# Patient Record
Sex: MALE | Race: WHITE | NOT HISPANIC OR LATINO | URBAN - METROPOLITAN AREA
[De-identification: names, ages, dates, MRNs, and addresses within clinical notes are randomized per-mention and may not be internally consistent; named-entity substitution may affect disease eponyms.]

---

## 2017-10-11 ENCOUNTER — EMERGENCY (EMERGENCY)
Facility: HOSPITAL | Age: 82
LOS: 1 days | Discharge: HOME | End: 2017-10-11
Attending: EMERGENCY MEDICINE | Admitting: EMERGENCY MEDICINE
Payer: MEDICARE

## 2017-10-11 VITALS
SYSTOLIC BLOOD PRESSURE: 102 MMHG | TEMPERATURE: 98 F | OXYGEN SATURATION: 100 % | DIASTOLIC BLOOD PRESSURE: 73 MMHG | RESPIRATION RATE: 18 BRPM | HEART RATE: 80 BPM

## 2017-10-11 VITALS
HEART RATE: 71 BPM | TEMPERATURE: 98 F | DIASTOLIC BLOOD PRESSURE: 67 MMHG | SYSTOLIC BLOOD PRESSURE: 110 MMHG | RESPIRATION RATE: 18 BRPM | OXYGEN SATURATION: 99 %

## 2017-10-11 DIAGNOSIS — E11.65 TYPE 2 DIABETES MELLITUS WITH HYPERGLYCEMIA: ICD-10-CM

## 2017-10-11 LAB
ALBUMIN SERPL ELPH-MCNC: 4.1 G/DL — SIGNIFICANT CHANGE UP (ref 3.3–5)
ALP SERPL-CCNC: 66 U/L — SIGNIFICANT CHANGE UP (ref 40–120)
ALT FLD-CCNC: 15 U/L — SIGNIFICANT CHANGE UP (ref 10–45)
ANION GAP SERPL CALC-SCNC: 13 MMOL/L — SIGNIFICANT CHANGE UP (ref 5–17)
AST SERPL-CCNC: 19 U/L — SIGNIFICANT CHANGE UP (ref 10–40)
BASE EXCESS BLDV CALC-SCNC: 3.6 MMOL/L — SIGNIFICANT CHANGE UP
BASOPHILS NFR BLD AUTO: 1.5 % — SIGNIFICANT CHANGE UP (ref 0–2)
BILIRUB SERPL-MCNC: 0.5 MG/DL — SIGNIFICANT CHANGE UP (ref 0.2–1.2)
BUN SERPL-MCNC: 25 MG/DL — HIGH (ref 7–23)
CALCIUM SERPL-MCNC: 9.5 MG/DL — SIGNIFICANT CHANGE UP (ref 8.4–10.5)
CHLORIDE SERPL-SCNC: 97 MMOL/L — SIGNIFICANT CHANGE UP (ref 96–108)
CO2 SERPL-SCNC: 26 MMOL/L — SIGNIFICANT CHANGE UP (ref 22–31)
CREAT SERPL-MCNC: 0.72 MG/DL — SIGNIFICANT CHANGE UP (ref 0.5–1.3)
EOSINOPHIL NFR BLD AUTO: 1.1 % — SIGNIFICANT CHANGE UP (ref 0–6)
GAS PNL BLDV: SIGNIFICANT CHANGE UP
GLUCOSE SERPL-MCNC: 322 MG/DL — HIGH (ref 70–99)
HCO3 BLDV-SCNC: 30 MMOL/L — HIGH (ref 20–27)
HCT VFR BLD CALC: 35.6 % — LOW (ref 39–50)
HGB BLD-MCNC: 12 G/DL — LOW (ref 13–17)
LIDOCAIN IGE QN: 32 U/L — SIGNIFICANT CHANGE UP (ref 7–60)
LYMPHOCYTES # BLD AUTO: 29.9 % — SIGNIFICANT CHANGE UP (ref 13–44)
MAGNESIUM SERPL-MCNC: 2 MG/DL — SIGNIFICANT CHANGE UP (ref 1.6–2.6)
MCHC RBC-ENTMCNC: 30.9 PG — SIGNIFICANT CHANGE UP (ref 27–34)
MCHC RBC-ENTMCNC: 33.7 G/DL — SIGNIFICANT CHANGE UP (ref 32–36)
MCV RBC AUTO: 91.8 FL — SIGNIFICANT CHANGE UP (ref 80–100)
MONOCYTES NFR BLD AUTO: 14.3 % — HIGH (ref 2–14)
NEUTROPHILS NFR BLD AUTO: 53.2 % — SIGNIFICANT CHANGE UP (ref 43–77)
PCO2 BLDV: 51 MMHG — SIGNIFICANT CHANGE UP (ref 41–51)
PH BLDV: 7.38 — SIGNIFICANT CHANGE UP (ref 7.32–7.43)
PLATELET # BLD AUTO: 153 K/UL — SIGNIFICANT CHANGE UP (ref 150–400)
PO2 BLDV: 41 MMHG — SIGNIFICANT CHANGE UP
POTASSIUM SERPL-MCNC: 4.2 MMOL/L — SIGNIFICANT CHANGE UP (ref 3.5–5.3)
POTASSIUM SERPL-SCNC: 4.2 MMOL/L — SIGNIFICANT CHANGE UP (ref 3.5–5.3)
PROT SERPL-MCNC: 7 G/DL — SIGNIFICANT CHANGE UP (ref 6–8.3)
RBC # BLD: 3.88 M/UL — LOW (ref 4.2–5.8)
RBC # FLD: 13.3 % — SIGNIFICANT CHANGE UP (ref 10.3–16.9)
SAO2 % BLDV: 70 % — SIGNIFICANT CHANGE UP
SODIUM SERPL-SCNC: 136 MMOL/L — SIGNIFICANT CHANGE UP (ref 135–145)
WBC # BLD: 5.3 K/UL — SIGNIFICANT CHANGE UP (ref 3.8–10.5)
WBC # FLD AUTO: 5.3 K/UL — SIGNIFICANT CHANGE UP (ref 3.8–10.5)

## 2017-10-11 PROCEDURE — 82962 GLUCOSE BLOOD TEST: CPT

## 2017-10-11 PROCEDURE — 80053 COMPREHEN METABOLIC PANEL: CPT

## 2017-10-11 PROCEDURE — 36415 COLL VENOUS BLD VENIPUNCTURE: CPT

## 2017-10-11 PROCEDURE — 83690 ASSAY OF LIPASE: CPT

## 2017-10-11 PROCEDURE — 82803 BLOOD GASES ANY COMBINATION: CPT

## 2017-10-11 PROCEDURE — 85025 COMPLETE CBC W/AUTO DIFF WBC: CPT

## 2017-10-11 PROCEDURE — 83735 ASSAY OF MAGNESIUM: CPT

## 2017-10-11 PROCEDURE — 99284 EMERGENCY DEPT VISIT MOD MDM: CPT

## 2017-10-11 PROCEDURE — 99284 EMERGENCY DEPT VISIT MOD MDM: CPT | Mod: 25

## 2017-10-11 RX ORDER — SODIUM CHLORIDE 9 MG/ML
1000 INJECTION INTRAMUSCULAR; INTRAVENOUS; SUBCUTANEOUS ONCE
Qty: 0 | Refills: 0 | Status: COMPLETED | OUTPATIENT
Start: 2017-10-11 | End: 2017-10-11

## 2017-10-11 RX ORDER — SODIUM CHLORIDE 9 MG/ML
500 INJECTION INTRAMUSCULAR; INTRAVENOUS; SUBCUTANEOUS ONCE
Qty: 0 | Refills: 0 | Status: COMPLETED | OUTPATIENT
Start: 2017-10-11 | End: 2017-10-11

## 2017-10-11 RX ADMIN — SODIUM CHLORIDE 500 MILLILITER(S): 9 INJECTION INTRAMUSCULAR; INTRAVENOUS; SUBCUTANEOUS at 04:05

## 2017-10-11 RX ADMIN — SODIUM CHLORIDE 1000 MILLILITER(S): 9 INJECTION INTRAMUSCULAR; INTRAVENOUS; SUBCUTANEOUS at 03:25

## 2017-10-11 NOTE — ED ADULT NURSE NOTE - OBJECTIVE STATEMENT
pt received into spot 3 A&Ox3 ambulatory appears comfortable complaining of hyperglycemia. Hx of DM on insulin and pills. States he checked his blood sugar at home and machine gave high reading. Pt denies any head ache blurry vision CP SOB abd pain N/V diarrhea constipation fever chills lightheadedness dizziness weakness muscle cramps increased thirst or frequency/ painful urination. Pt seems to have concern there might be an error with his machine he uses to check his sugar. Initial triage FS was 283. 20G PIV was placed to LAC labs drawn and sent pt given NS bolus x1.5. Serum glucose noted at 322. Pt refused 2nd full liter that was going to be ordered. FS recheck at this time was noted at 253. Pt stating he wants to go home but wants us to provide a new machine to check glucose. pt pulled out his machine to compare and the FS was noted at 305 with patient's own glucometer. Though this was slughtly different than ours, pt is going to continue regular medication regimen and see PMD Yasmine this week for possible medication adjustment or to facilitate new FS check equipment and supplies

## 2017-10-11 NOTE — ED ADULT NURSE NOTE - CHIEF COMPLAINT QUOTE
pt c/o high blood sugar at home machine reading was 346mg/dl , pt denies any polydipsia , no polyuria, pt c/o unsteady gait,  mg/dl in triage

## 2017-10-11 NOTE — ED ADULT TRIAGE NOTE - CHIEF COMPLAINT QUOTE
pt c/o high blood sugar at home machine reading was 346mg/dl , pt denies any polydipsia , no polyuria, pt c/o unsteady gait pt c/o high blood sugar at home machine reading was 346mg/dl , pt denies any polydipsia , no polyuria, pt c/o unsteady gait,  mg/dl in triage

## 2017-10-11 NOTE — ED PROVIDER NOTE - ATTENDING CONTRIBUTION TO CARE
I have reviewed all pertinent clinical information and agree with the documentation/care/plan provided by PA.

## 2018-07-20 ENCOUNTER — APPOINTMENT (OUTPATIENT)
Dept: HEART AND VASCULAR | Facility: CLINIC | Age: 83
End: 2018-07-20
Payer: MEDICARE

## 2018-07-20 ENCOUNTER — APPOINTMENT (OUTPATIENT)
Dept: HEART AND VASCULAR | Facility: CLINIC | Age: 83
End: 2018-07-20

## 2018-07-20 VITALS
BODY MASS INDEX: 22.65 KG/M2 | SYSTOLIC BLOOD PRESSURE: 110 MMHG | WEIGHT: 146.03 LBS | TEMPERATURE: 98.1 F | RESPIRATION RATE: 12 BRPM | OXYGEN SATURATION: 97 % | HEART RATE: 87 BPM | DIASTOLIC BLOOD PRESSURE: 56 MMHG | HEIGHT: 67.5 IN

## 2018-07-20 DIAGNOSIS — Z78.9 OTHER SPECIFIED HEALTH STATUS: ICD-10-CM

## 2018-07-20 DIAGNOSIS — Z87.891 PERSONAL HISTORY OF NICOTINE DEPENDENCE: ICD-10-CM

## 2018-07-20 PROBLEM — E11.9 TYPE 2 DIABETES MELLITUS WITHOUT COMPLICATIONS: Chronic | Status: ACTIVE | Noted: 2017-10-11

## 2018-07-20 PROCEDURE — 99214 OFFICE O/P EST MOD 30 MIN: CPT | Mod: 25

## 2018-07-20 PROCEDURE — 93000 ELECTROCARDIOGRAM COMPLETE: CPT

## 2018-07-20 RX ORDER — LINAGLIPTIN 5 MG/1
5 TABLET, FILM COATED ORAL
Refills: 0 | Status: ACTIVE | COMMUNITY

## 2018-07-20 RX ORDER — INSULIN LISPRO 100 [IU]/ML
100 INJECTION, SOLUTION INTRAVENOUS; SUBCUTANEOUS
Refills: 0 | Status: ACTIVE | COMMUNITY

## 2018-07-25 ENCOUNTER — APPOINTMENT (OUTPATIENT)
Dept: HEART AND VASCULAR | Facility: CLINIC | Age: 83
End: 2018-07-25
Payer: MEDICARE

## 2018-07-25 DIAGNOSIS — R06.09 OTHER FORMS OF DYSPNEA: ICD-10-CM

## 2018-07-25 DIAGNOSIS — E11.9 TYPE 2 DIABETES MELLITUS W/OUT COMPLICATIONS: ICD-10-CM

## 2018-07-25 PROCEDURE — 78452 HT MUSCLE IMAGE SPECT MULT: CPT

## 2018-07-25 PROCEDURE — 99213 OFFICE O/P EST LOW 20 MIN: CPT | Mod: 25

## 2018-07-25 PROCEDURE — A9500: CPT

## 2018-07-25 PROCEDURE — 93015 CV STRESS TEST SUPVJ I&R: CPT

## 2018-07-25 RX ORDER — LATANOPROST/PF 0.005 %
0.01 DROPS OPHTHALMIC (EYE)
Qty: 5 | Refills: 0 | Status: ACTIVE | COMMUNITY
Start: 2018-07-10

## 2019-09-04 ENCOUNTER — EMERGENCY (EMERGENCY)
Facility: HOSPITAL | Age: 84
LOS: 1 days | Discharge: HOME | End: 2019-09-04
Attending: EMERGENCY MEDICINE | Admitting: EMERGENCY MEDICINE
Payer: MEDICARE

## 2019-09-04 VITALS
WEIGHT: 143.96 LBS | HEIGHT: 70 IN | OXYGEN SATURATION: 99 % | DIASTOLIC BLOOD PRESSURE: 71 MMHG | TEMPERATURE: 98 F | RESPIRATION RATE: 18 BRPM | SYSTOLIC BLOOD PRESSURE: 149 MMHG | HEART RATE: 69 BPM

## 2019-09-04 DIAGNOSIS — E11.649 TYPE 2 DIABETES MELLITUS WITH HYPOGLYCEMIA WITHOUT COMA: ICD-10-CM

## 2019-09-04 DIAGNOSIS — E16.2 HYPOGLYCEMIA, UNSPECIFIED: ICD-10-CM

## 2019-09-04 LAB
BASOPHILS # BLD AUTO: 0.06 K/UL — SIGNIFICANT CHANGE UP (ref 0–0.2)
BASOPHILS NFR BLD AUTO: 1.2 % — SIGNIFICANT CHANGE UP (ref 0–2)
EOSINOPHIL # BLD AUTO: 0.03 K/UL — SIGNIFICANT CHANGE UP (ref 0–0.5)
EOSINOPHIL NFR BLD AUTO: 0.6 % — SIGNIFICANT CHANGE UP (ref 0–6)
HCT VFR BLD CALC: 37.1 % — LOW (ref 39–50)
HGB BLD-MCNC: 11.7 G/DL — LOW (ref 13–17)
IMM GRANULOCYTES NFR BLD AUTO: 0.2 % — SIGNIFICANT CHANGE UP (ref 0–1.5)
LYMPHOCYTES # BLD AUTO: 1.31 K/UL — SIGNIFICANT CHANGE UP (ref 1–3.3)
LYMPHOCYTES # BLD AUTO: 26.6 % — SIGNIFICANT CHANGE UP (ref 13–44)
MCHC RBC-ENTMCNC: 30.9 PG — SIGNIFICANT CHANGE UP (ref 27–34)
MCHC RBC-ENTMCNC: 31.5 GM/DL — LOW (ref 32–36)
MCV RBC AUTO: 97.9 FL — SIGNIFICANT CHANGE UP (ref 80–100)
MONOCYTES # BLD AUTO: 0.59 K/UL — SIGNIFICANT CHANGE UP (ref 0–0.9)
MONOCYTES NFR BLD AUTO: 12 % — SIGNIFICANT CHANGE UP (ref 2–14)
NEUTROPHILS # BLD AUTO: 2.93 K/UL — SIGNIFICANT CHANGE UP (ref 1.8–7.4)
NEUTROPHILS NFR BLD AUTO: 59.4 % — SIGNIFICANT CHANGE UP (ref 43–77)
NRBC # BLD: 0 /100 WBCS — SIGNIFICANT CHANGE UP (ref 0–0)
PLATELET # BLD AUTO: 165 K/UL — SIGNIFICANT CHANGE UP (ref 150–400)
RBC # BLD: 3.79 M/UL — LOW (ref 4.2–5.8)
RBC # FLD: 13 % — SIGNIFICANT CHANGE UP (ref 10.3–14.5)
WBC # BLD: 4.93 K/UL — SIGNIFICANT CHANGE UP (ref 3.8–10.5)
WBC # FLD AUTO: 4.93 K/UL — SIGNIFICANT CHANGE UP (ref 3.8–10.5)

## 2019-09-04 PROCEDURE — 99284 EMERGENCY DEPT VISIT MOD MDM: CPT

## 2019-09-04 NOTE — ED ADULT TRIAGE NOTE - CHIEF COMPLAINT QUOTE
Pt states " I had low BS this am 53-58mg/dl. I ate bananas, tangerines and I took glucose pills and now it's 159mg/dl. I have meter in my arm and when it was low it told me to take blood but I don't have any strips at home". BS 157mg/dl. Pt states generally takes 3 units of Novalog insulin but didn't take any today.

## 2019-09-04 NOTE — ED ADULT NURSE NOTE - CHPI ED NUR SYMPTOMS NEG
no pain/no vomiting/no chills/no decreased eating/drinking/no weakness/no dizziness/no fever/no tingling/no nausea

## 2019-09-04 NOTE — ED ADULT NURSE NOTE - OBJECTIVE STATEMENT
Pt came in to ED c/o "low sugar". Capillary blood glucose 157 upon arrival to ED. No diaphoresis, no weakness, no change in mental status. per patient his endocrinologist may have adjusted his blood glucose machine. No other c/o voiced

## 2019-09-05 VITALS
SYSTOLIC BLOOD PRESSURE: 146 MMHG | OXYGEN SATURATION: 99 % | TEMPERATURE: 98 F | RESPIRATION RATE: 16 BRPM | HEART RATE: 67 BPM | DIASTOLIC BLOOD PRESSURE: 65 MMHG

## 2019-09-05 DIAGNOSIS — Z98.890 OTHER SPECIFIED POSTPROCEDURAL STATES: Chronic | ICD-10-CM

## 2019-09-05 LAB
ALBUMIN SERPL ELPH-MCNC: 4.4 G/DL — SIGNIFICANT CHANGE UP (ref 3.3–5)
ALP SERPL-CCNC: 65 U/L — SIGNIFICANT CHANGE UP (ref 40–120)
ALT FLD-CCNC: 10 U/L — SIGNIFICANT CHANGE UP (ref 10–45)
ANION GAP SERPL CALC-SCNC: 10 MMOL/L — SIGNIFICANT CHANGE UP (ref 5–17)
AST SERPL-CCNC: 20 U/L — SIGNIFICANT CHANGE UP (ref 10–40)
BILIRUB SERPL-MCNC: 0.4 MG/DL — SIGNIFICANT CHANGE UP (ref 0.2–1.2)
BUN SERPL-MCNC: 13 MG/DL — SIGNIFICANT CHANGE UP (ref 7–23)
CALCIUM SERPL-MCNC: 9.5 MG/DL — SIGNIFICANT CHANGE UP (ref 8.4–10.5)
CHLORIDE SERPL-SCNC: 101 MMOL/L — SIGNIFICANT CHANGE UP (ref 96–108)
CO2 SERPL-SCNC: 30 MMOL/L — SIGNIFICANT CHANGE UP (ref 22–31)
CREAT SERPL-MCNC: 0.68 MG/DL — SIGNIFICANT CHANGE UP (ref 0.5–1.3)
GLUCOSE SERPL-MCNC: 162 MG/DL — HIGH (ref 70–99)
MAGNESIUM SERPL-MCNC: 2 MG/DL — SIGNIFICANT CHANGE UP (ref 1.6–2.6)
POTASSIUM SERPL-MCNC: 4.6 MMOL/L — SIGNIFICANT CHANGE UP (ref 3.5–5.3)
POTASSIUM SERPL-SCNC: 4.6 MMOL/L — SIGNIFICANT CHANGE UP (ref 3.5–5.3)
PROT SERPL-MCNC: 7.1 G/DL — SIGNIFICANT CHANGE UP (ref 6–8.3)
SODIUM SERPL-SCNC: 141 MMOL/L — SIGNIFICANT CHANGE UP (ref 135–145)

## 2019-09-05 PROCEDURE — 83735 ASSAY OF MAGNESIUM: CPT

## 2019-09-05 PROCEDURE — 99283 EMERGENCY DEPT VISIT LOW MDM: CPT

## 2019-09-05 PROCEDURE — 80053 COMPREHEN METABOLIC PANEL: CPT

## 2019-09-05 PROCEDURE — 82962 GLUCOSE BLOOD TEST: CPT

## 2019-09-05 PROCEDURE — 36415 COLL VENOUS BLD VENIPUNCTURE: CPT

## 2019-09-05 PROCEDURE — 85025 COMPLETE CBC W/AUTO DIFF WBC: CPT

## 2019-09-05 NOTE — ED PROVIDER NOTE - PROGRESS NOTE DETAILS
pt feeling well, no hypoglycemia while in ED, tolerating po.  advised to continue monitoring sugar at home.  have passed maximal onset of tresiba as pt took medication in the morning.   I have discussed the discharge plan with the patient. The patient agrees with the plan, as discussed.  The patient understands Emergency Department diagnosis is a preliminary diagnosis often based on limited information and that the patient must adhere to the follow-up plan as discussed.  The patient understands that if the symptoms worsen the patient may return to the Emergency Department at any time for further evaluation and treatment.

## 2019-09-05 NOTE — ED PROVIDER NOTE - PATIENT PORTAL LINK FT
You can access the FollowMyHealth Patient Portal offered by [facility] by registering at the following website: [email or web address]. By joining [employer]’s FollowMyHealth portal, you will also be able to view your health information using other applications (apps) compatible with our system.

## 2019-09-05 NOTE — ED PROVIDER NOTE - CLINICAL SUMMARY MEDICAL DECISION MAKING FREE TEXT BOX
hypoglycemic earlier today, takes tresiba and novolog only, however not since the morning. no focal neuro deficits  -check labs  -monitor FS

## 2019-09-05 NOTE — ED PROVIDER NOTE - NSFOLLOWUPINSTRUCTIONS_ED_ALL_ED_FT
Preventing Hypoglycemia  Hypoglycemia occurs when the level of sugar (glucose) in the blood is too low. Hypoglycemia can happen in people who do or do not have diabetes (diabetes mellitus). It can develop quickly, and it can be a medical emergency. For most people with diabetes, a blood glucose level below 70 mg/dL (3.9 mmol/L) is considered hypoglycemia.    Glucose is a type of sugar that provides the body's main source of energy. Certain hormones (insulin and glucagon) control the level of glucose in the blood. Insulin lowers blood glucose, and glucagon increases blood glucose. Hypoglycemia can result from having too much insulin in the bloodstream, or from not eating enough food that contains glucose.    Your risk for hypoglycemia is higher:  If you take insulin or diabetes medicines to help lower your blood glucose or help your body make more insulin.  If you skip or delay a meal or snack.  If you are ill.  During and after exercise.  You can prevent hypoglycemia by working with your health care provider to adjust your meal plan as needed and by taking other precautions.    How can hypoglycemia affect me?  Mild symptoms     Mild hypoglycemia may not cause any symptoms. If you do have symptoms, they may include:  Hunger.  Anxiety.  Sweating and feeling clammy.  Dizziness or feeling light-headed.  Sleepiness.  Nausea.  Increased heart rate.  Headache.  Blurry vision.  Irritability.  Tingling or numbness around the mouth, lips, or tongue.  A change in coordination.  Restless sleep.  If mild hypoglycemia is not recognized and treated, it can quickly become moderate or severe hypoglycemia.    Moderate symptoms    Moderate hypoglycemia can cause:  Mental confusion and poor judgment.  Behavior changes.  Weakness.  Irregular heartbeat.  Severe symptoms    Severe hypoglycemia is a medical emergency. It can cause:  Fainting.  Seizures.  Loss of consciousness (coma).  Death.  What nutrition changes can be made?  Work with your health care provider or diet and nutrition specialist (dietitian) to make a healthy meal plan that is right for you. Follow your meal plan carefully.  Eat meals at regular times.  If recommended by your health care provider, have snacks between meals.  Do not skip or delay meals or snacks. You can be at risk for hypoglycemia if you are not getting enough carbohydrates.  What lifestyle changes can be made?  Image   Work closely with your health care provider to manage your blood glucose. Make sure you know:  Your goal blood glucose levels.  How and when to check your blood glucose.  The symptoms of hypoglycemia. It is important to treat it right away to keep it from becoming severe.  Do not drink alcohol on an empty stomach.  When you are ill, check your blood glucose more often than usual. Follow your sick day plan whenever you cannot eat or drink normally. Make this plan in advance with your health care provider.  Always check your blood glucose before, during, and after exercise.  How is this treated?  This condition can often be treated by immediately eating or drinking something that contains sugar, such as:  Fruit juice, 4 oz (120 mL).  Regular soda (not diet soda), 4 oz (120 mL).  Low-fat milk, 4 oz (120 mL).  Several pieces of hard candy.  Sugar or honey, 1 Tbsp.  Treating hypoglycemia if you have diabetes    If you are alert and able to swallow safely, follow the 15:15 rule:  Take 15 grams of a rapid-acting carbohydrate. Rapid-acting options include:  1 tube of glucose gel.  3 glucose pills.  6–8 pieces of hard candy.  4 oz (120 mL) of fruit juice.  4 oz (120 mL) of regular (not diet) soda.  Check your blood glucose 15 minutes after you take the carbohydrate.  If the repeat blood glucose level is still at or below 70 mg/dL (3.9 mmol/L), take 15 grams of a carbohydrate again.  If your blood glucose level does not increase above 70 mg/dL (3.9 mmol/L) after 3 tries, seek emergency medical care.  After your blood glucose level returns to normal, eat a meal or a snack within 1 hour.  Treating severe hypoglycemia    Severe hypoglycemia is when your blood glucose level is at or below 54 mg/dL (3 mmol/L). Severe hypoglycemia is a medical emergency. Get medical help right away.    If you have severe hypoglycemia and you cannot eat or drink, you may need an injection of glucagon. A family member or close friend should learn how to check your blood glucose and how to give you a glucagon injection. Ask your health care provider if you need to have an emergency glucagon injection kit available.    Severe hypoglycemia may need to be treated in a hospital. The treatment may include getting glucose through an IV. You may also need treatment for the cause of your hypoglycemia.    Where to find more information  American Diabetes Association: www.diabetes.org  National [place] of Diabetes and Digestive and Kidney Diseases: www.niddk.nih.gov  Contact a health care provider if:  You have problems keeping your blood glucose in your target range.  You have frequent episodes of hypoglycemia.  Get help right away if:  You continue to have hypoglycemia symptoms after eating or drinking something containing glucose.  Your blood glucose level is at or below 54 mg/dL (3 mmol/L).  You faint.  You have a seizure.  These symptoms may represent a serious problem that is an emergency. Do not wait to see if the symptoms will go away. Get medical help right away. Call your local emergency services (911 in the [place]     Summary  Know the symptoms of hypoglycemia, and when you are at risk for it (such as during exercise or when you are sick). Check your blood glucose often when you are at risk for hypoglycemia.  Hypoglycemia can develop quickly, and it can be dangerous if it is not treated right away. If you have a history of severe hypoglycemia, make sure you know how to use your glucagon injection kit.  Make sure you know how to treat hypoglycemia. Keep a carbohydrate snack available when you may be at risk for hypoglycemia.  This information is not intended to replace advice given to you by your health care provider. Make sure you discuss any questions you have with your health care provider.

## 2019-09-05 NOTE — ED PROVIDER NOTE - OBJECTIVE STATEMENT
89M hx dm, c/o hypoglycemia. pt states took his tresiba in the morning and then 3U novolog prior to eating breakfast. states has not taken any insulin since.  had appt with his endocrinologist this morning.  does not currently take any oral medication.  states his sugar dropped earlier today and he felt a little confused. states ate a tangerine and drank some juice. pt states concerned that readings have been in the 80s-90s since.  no n/v/d. no fevers. no chest pain. no SOB. no abd pain.

## 2019-11-07 ENCOUNTER — EMERGENCY (EMERGENCY)
Facility: HOSPITAL | Age: 84
LOS: 1 days | Discharge: HOME | End: 2019-11-07
Attending: EMERGENCY MEDICINE | Admitting: EMERGENCY MEDICINE
Payer: MEDICARE

## 2019-11-07 VITALS
RESPIRATION RATE: 16 BRPM | OXYGEN SATURATION: 96 % | DIASTOLIC BLOOD PRESSURE: 73 MMHG | TEMPERATURE: 98 F | HEART RATE: 79 BPM | SYSTOLIC BLOOD PRESSURE: 144 MMHG

## 2019-11-07 VITALS
DIASTOLIC BLOOD PRESSURE: 59 MMHG | HEART RATE: 108 BPM | TEMPERATURE: 98 F | WEIGHT: 138.89 LBS | OXYGEN SATURATION: 97 % | SYSTOLIC BLOOD PRESSURE: 114 MMHG | HEIGHT: 68 IN | RESPIRATION RATE: 18 BRPM

## 2019-11-07 DIAGNOSIS — Z98.890 OTHER SPECIFIED POSTPROCEDURAL STATES: Chronic | ICD-10-CM

## 2019-11-07 LAB
ALBUMIN SERPL ELPH-MCNC: 3.7 G/DL — SIGNIFICANT CHANGE UP (ref 3.3–5)
ALP SERPL-CCNC: 64 U/L — SIGNIFICANT CHANGE UP (ref 40–120)
ALT FLD-CCNC: 10 U/L — SIGNIFICANT CHANGE UP (ref 10–45)
ANION GAP SERPL CALC-SCNC: 15 MMOL/L — SIGNIFICANT CHANGE UP (ref 5–17)
ANISOCYTOSIS BLD QL: SLIGHT — SIGNIFICANT CHANGE UP
AST SERPL-CCNC: 18 U/L — SIGNIFICANT CHANGE UP (ref 10–40)
BASOPHILS # BLD AUTO: 0 K/UL — SIGNIFICANT CHANGE UP (ref 0–0.2)
BASOPHILS NFR BLD AUTO: 0 % — SIGNIFICANT CHANGE UP (ref 0–2)
BILIRUB SERPL-MCNC: 0.7 MG/DL — SIGNIFICANT CHANGE UP (ref 0.2–1.2)
BUN SERPL-MCNC: 16 MG/DL — SIGNIFICANT CHANGE UP (ref 7–23)
BURR CELLS BLD QL SMEAR: PRESENT — SIGNIFICANT CHANGE UP
CALCIUM SERPL-MCNC: 9.4 MG/DL — SIGNIFICANT CHANGE UP (ref 8.4–10.5)
CHLORIDE SERPL-SCNC: 95 MMOL/L — LOW (ref 96–108)
CO2 SERPL-SCNC: 30 MMOL/L — SIGNIFICANT CHANGE UP (ref 22–31)
CREAT SERPL-MCNC: 0.67 MG/DL — SIGNIFICANT CHANGE UP (ref 0.5–1.3)
DACRYOCYTES BLD QL SMEAR: SLIGHT — SIGNIFICANT CHANGE UP
EOSINOPHIL # BLD AUTO: 0 K/UL — SIGNIFICANT CHANGE UP (ref 0–0.5)
EOSINOPHIL NFR BLD AUTO: 0 % — SIGNIFICANT CHANGE UP (ref 0–6)
GIANT PLATELETS BLD QL SMEAR: PRESENT — SIGNIFICANT CHANGE UP
GLUCOSE SERPL-MCNC: 332 MG/DL — HIGH (ref 70–99)
HCT VFR BLD CALC: 36.6 % — LOW (ref 39–50)
HGB BLD-MCNC: 11.4 G/DL — LOW (ref 13–17)
HYPOCHROMIA BLD QL: SLIGHT — SIGNIFICANT CHANGE UP
LYMPHOCYTES # BLD AUTO: 0.29 K/UL — LOW (ref 1–3.3)
LYMPHOCYTES # BLD AUTO: 4.5 % — LOW (ref 13–44)
MACROCYTES BLD QL: SLIGHT — SIGNIFICANT CHANGE UP
MANUAL SMEAR VERIFICATION: SIGNIFICANT CHANGE UP
MCHC RBC-ENTMCNC: 30 PG — SIGNIFICANT CHANGE UP (ref 27–34)
MCHC RBC-ENTMCNC: 31.1 GM/DL — LOW (ref 32–36)
MCV RBC AUTO: 96.3 FL — SIGNIFICANT CHANGE UP (ref 80–100)
MONOCYTES # BLD AUTO: 0.4 K/UL — SIGNIFICANT CHANGE UP (ref 0–0.9)
MONOCYTES NFR BLD AUTO: 6.3 % — SIGNIFICANT CHANGE UP (ref 2–14)
NEUTROPHILS # BLD AUTO: 5.72 K/UL — SIGNIFICANT CHANGE UP (ref 1.8–7.4)
NEUTROPHILS NFR BLD AUTO: 87.4 % — HIGH (ref 43–77)
NEUTS BAND # BLD: 1.8 % — SIGNIFICANT CHANGE UP (ref 0–8)
OVALOCYTES BLD QL SMEAR: SLIGHT — SIGNIFICANT CHANGE UP
PLAT MORPH BLD: ABNORMAL
PLATELET # BLD AUTO: 222 K/UL — SIGNIFICANT CHANGE UP (ref 150–400)
POIKILOCYTOSIS BLD QL AUTO: SLIGHT — SIGNIFICANT CHANGE UP
POLYCHROMASIA BLD QL SMEAR: SLIGHT — SIGNIFICANT CHANGE UP
POTASSIUM SERPL-MCNC: 4.6 MMOL/L — SIGNIFICANT CHANGE UP (ref 3.5–5.3)
POTASSIUM SERPL-SCNC: 4.6 MMOL/L — SIGNIFICANT CHANGE UP (ref 3.5–5.3)
PROT SERPL-MCNC: 6.9 G/DL — SIGNIFICANT CHANGE UP (ref 6–8.3)
RBC # BLD: 3.8 M/UL — LOW (ref 4.2–5.8)
RBC # FLD: 13 % — SIGNIFICANT CHANGE UP (ref 10.3–14.5)
RBC BLD AUTO: ABNORMAL
SMUDGE CELLS # BLD: PRESENT — SIGNIFICANT CHANGE UP
SODIUM SERPL-SCNC: 140 MMOL/L — SIGNIFICANT CHANGE UP (ref 135–145)
WBC # BLD: 6.41 K/UL — SIGNIFICANT CHANGE UP (ref 3.8–10.5)
WBC # FLD AUTO: 6.41 K/UL — SIGNIFICANT CHANGE UP (ref 3.8–10.5)

## 2019-11-07 PROCEDURE — 93005 ELECTROCARDIOGRAM TRACING: CPT

## 2019-11-07 PROCEDURE — 85025 COMPLETE CBC W/AUTO DIFF WBC: CPT

## 2019-11-07 PROCEDURE — 93010 ELECTROCARDIOGRAM REPORT: CPT

## 2019-11-07 PROCEDURE — 99283 EMERGENCY DEPT VISIT LOW MDM: CPT | Mod: 25

## 2019-11-07 PROCEDURE — 36415 COLL VENOUS BLD VENIPUNCTURE: CPT

## 2019-11-07 PROCEDURE — 99284 EMERGENCY DEPT VISIT MOD MDM: CPT

## 2019-11-07 PROCEDURE — 82962 GLUCOSE BLOOD TEST: CPT

## 2019-11-07 PROCEDURE — 80053 COMPREHEN METABOLIC PANEL: CPT

## 2019-11-07 RX ORDER — SODIUM CHLORIDE 9 MG/ML
1000 INJECTION INTRAMUSCULAR; INTRAVENOUS; SUBCUTANEOUS ONCE
Refills: 0 | Status: COMPLETED | OUTPATIENT
Start: 2019-11-07 | End: 2019-11-07

## 2019-11-07 RX ADMIN — SODIUM CHLORIDE 1000 MILLILITER(S): 9 INJECTION INTRAMUSCULAR; INTRAVENOUS; SUBCUTANEOUS at 12:46

## 2019-11-07 NOTE — ED PROVIDER NOTE - CLINICAL SUMMARY MEDICAL DECISION MAKING FREE TEXT BOX
89 y/o M with PMHx DM presents with water stool after taking marshall prep for constipation, no associated abdominal pain, nausea, vomiting, fever, or chills. Pt is afebrile and hemodynamically stable; abdominal exam benign. Will check labs to r/o electrolyte abnormalities and check renal function, give IV fluids and reassess. 89 y/o M with PMHx DM presents with watery stool after taking bowel prep for constipation, no associated abdominal pain, nausea, vomiting, fever, or chills. Pt is afebrile and hemodynamically stable; abdominal exam benign. Will check labs to r/o electrolyte abnormalities and check renal function, give IV fluids and reassess.

## 2019-11-07 NOTE — ED ADULT NURSE NOTE - OBJECTIVE STATEMENT
pt received in bed, A&O x 3. per pt, arrived to ED today with c/c generalized weakness, decreased appetite. per pt, was recently constipated and was prescribed bowel prep to alleviate constipation and to have subsequent colonoscopy. pt states after taking bowel prep, has had diarrhea and woke up this morning with stool incontinence. pt denies fever, chills, n/v, CP or SOB. NAD noted at this time, will continue to monitor.

## 2019-11-07 NOTE — ED ADULT TRIAGE NOTE - OTHER COMPLAINTS
pt reports generalized weakness started last night. states, "I took a laxative because I was constipated and woke up covered in stool this morning." denies pain, fevers/chills, nausea/vomiting. EKG in progress.

## 2019-11-07 NOTE — ED ADULT NURSE NOTE - NSIMPLEMENTINTERV_GEN_ALL_ED
Implemented All Fall Risk Interventions:  [place] to call system. Call bell, personal items and telephone within reach. Instruct patient to call for assistance. Room bathroom lighting operational. Non-slip footwear when patient is off stretcher. Physically safe environment: no spills, clutter or unnecessary equipment. Stretcher in lowest position, wheels locked, appropriate side rails in place. Provide visual cue, wrist band, yellow gown, etc. Monitor gait and stability. Monitor for mental status changes and reorient to person, place, and time. Review medications for side effects contributing to fall risk. Reinforce activity limits and safety measures with patient and family.

## 2019-11-07 NOTE — ED PROVIDER NOTE - PROGRESS NOTE DETAILS
Labs reviewed and unremarkable. + mild hyperglycemia with neg ag, improved with ivf hydration. No further episodes of diarrhea while in ed. Pt ambulatory in ed without difficulty. Pt seen by case management/ social work and vns contacted. Pt deemed safe for dc home with f/u by vns tomorrow. Plan of care discussed with pt who is understanding of and comfortable w/ plan.

## 2019-11-07 NOTE — ED PROVIDER NOTE - NSFOLLOWUPINSTRUCTIONS_ED_ALL_ED_FT
Drink plenty of fluids. Follow up with your primary care physician for re-evaluation. Return to er for any new or worsening symptoms.     Diarrhea    Diarrhea is frequent loose or watery bowel movements that has many causes. Diarrhea can make you feel weak and cause you to become dehydrated. Diarrhea typically lasts 2–3 days, but can last longer if it is a sign of something more serious. Drink clear fluids to prevent dehydration. Eat bland, easy-to-digest foods as tolerated.     SEEK IMMEDIATE MEDICAL CARE IF YOU HAVE ANY OF THE FOLLOWING SYMPTOMS: high fevers, lightheadedness/dizziness, chest pain, black or bloody stools, shortness of breath, severe abdominal or back pain, or any signs of dehydration.

## 2019-11-07 NOTE — ED PROVIDER NOTE - PATIENT PORTAL LINK FT
You can access the FollowMyHealth Patient Portal offered by [facility] by registering at the following website: [email or web address]. By joining [employer]’s FollowMyHealth portal, you will also be able to view your health information using other applications (apps) compatible with our system.

## 2019-11-07 NOTE — ED PROVIDER NOTE - OBJECTIVE STATEMENT
89 y/o M with PMHx DM presents to the ED with watery stool and diarrhea after bowel prep regimen yesterday. Pt reports seeing Dr. Richey yesterday for 5 days of constipation, and was given a bowel prep regimen; pt woke up today with incontinence of stool. Denies abdominal pain, nausea, vomiting, melena, bloody stool, fever, chills, chest pain or SOB. 88 y/o M with PMHx DM presents to the ED with watery stool and diarrhea after bowel prep regimen yesterday. Pt reports seeing his physician yesterday for 5 days of constipation, and was given a bowel prep regimen; pt woke up today with incontinence of stool. Denies abdominal pain, nausea, vomiting, melena, bloody stool, fever, chills, chest pain or SOB, dizziness, weakness. No recent abx use/ sick contacts.

## 2019-11-07 NOTE — ED ADULT NURSE NOTE - CHPI ED NUR SYMPTOMS NEG
no fever/no chills/no dysuria/no abdominal distension/no burning urination/no vomiting/no hematuria/no nausea/no blood in stool

## 2019-11-07 NOTE — ED PROVIDER NOTE - PHYSICAL EXAMINATION
VITAL SIGNS: I have reviewed nursing notes and confirm.  CONSTITUTIONAL: Well-developed; well-nourished; in no acute distress.   SKIN:  warm and dry, no acute rash.   HEAD:  normocephalic, atraumatic.  EYES: PERRL, EOM intact; conjunctiva and sclera clear.  ENT: No nasal discharge; airway clear, dry mucous membranes.  NECK: Supple; non tender.  CARD: S1, S2 normal; no murmurs, gallops, or rubs. Regular rate and rhythm. with frequent PVCs.  RESP:  Clear to auscultation b/l, no wheezes, rales or rhonchi.  ABD: Normal bowel sounds; soft; non-distended; non-tender; no guarding/ rebound.  EXT: Normal ROM. No clubbing, or cyanosis. Mild edema to lower extremities. 2+ pulses to b/l ue/le.  NEURO: Alert, oriented, grossly unremarkable  PSYCH: Cooperative, mood and affect appropriate. VITAL SIGNS: I have reviewed nursing notes and confirm.  CONSTITUTIONAL: Well-developed; well-nourished; in no acute distress.   SKIN:  warm and dry, no acute rash.   HEAD:  normocephalic, atraumatic.  EYES: EOM intact; conjunctiva and sclera clear.  ENT: No nasal discharge; airway clear, dry mucous membranes.  NECK: Supple; non tender.  CARD: S1, S2 normal; no murmurs, gallops, or rubs. Regular rate and rhythm. with frequent PVCs.  RESP:  Clear to auscultation b/l, no wheezes, rales or rhonchi.  ABD: Normal bowel sounds; soft; non-distended; non-tender; no guarding/ rebound.  EXT: Normal ROM. No clubbing, or cyanosis. Mild edema to lower extremities. 2+ pulses to b/l ue/le.  NEURO: Alert, oriented, grossly unremarkable  PSYCH: Cooperative, mood and affect appropriate.

## 2019-11-14 DIAGNOSIS — R19.7 DIARRHEA, UNSPECIFIED: ICD-10-CM

## 2019-11-14 DIAGNOSIS — R53.1 WEAKNESS: ICD-10-CM

## 2019-11-28 ENCOUNTER — INPATIENT (INPATIENT)
Facility: HOSPITAL | Age: 84
LOS: 17 days | Discharge: NURSING, REHAB OR LONG-TERM CARE | DRG: 85 | End: 2019-12-16
Payer: MEDICARE

## 2019-11-28 VITALS
OXYGEN SATURATION: 98 % | SYSTOLIC BLOOD PRESSURE: 151 MMHG | RESPIRATION RATE: 18 BRPM | TEMPERATURE: 97 F | HEART RATE: 90 BPM | DIASTOLIC BLOOD PRESSURE: 88 MMHG

## 2019-11-28 DIAGNOSIS — S06.6X0A TRAUMATIC SUBARACHNOID HEMORRHAGE WITHOUT LOSS OF CONSCIOUSNESS, INITIAL ENCOUNTER: ICD-10-CM

## 2019-11-28 DIAGNOSIS — L89.619 PRESSURE ULCER OF RIGHT HEEL, UNSPECIFIED STAGE: ICD-10-CM

## 2019-11-28 DIAGNOSIS — L89.312 PRESSURE ULCER OF RIGHT BUTTOCK, STAGE 2: ICD-10-CM

## 2019-11-28 DIAGNOSIS — I48.0 PAROXYSMAL ATRIAL FIBRILLATION: ICD-10-CM

## 2019-11-28 DIAGNOSIS — L89.322 PRESSURE ULCER OF LEFT BUTTOCK, STAGE 2: ICD-10-CM

## 2019-11-28 DIAGNOSIS — B00.9 HERPESVIRAL INFECTION, UNSPECIFIED: ICD-10-CM

## 2019-11-28 DIAGNOSIS — F05 DELIRIUM DUE TO KNOWN PHYSIOLOGICAL CONDITION: ICD-10-CM
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
## 2019-11-28 DIAGNOSIS — L89.019 PRESSURE ULCER OF RIGHT ELBOW, UNSPECIFIED STAGE: ICD-10-CM

## 2019-11-28 DIAGNOSIS — E11.9 TYPE 2 DIABETES MELLITUS WITHOUT COMPLICATIONS: ICD-10-CM

## 2019-11-28 DIAGNOSIS — R13.10 DYSPHAGIA, UNSPECIFIED: ICD-10-CM

## 2019-11-28 DIAGNOSIS — S06.5X0A TRAUMATIC SUBDURAL HEMORRHAGE WITHOUT LOSS OF CONSCIOUSNESS, INITIAL ENCOUNTER: ICD-10-CM

## 2019-11-28 DIAGNOSIS — E04.1 NONTOXIC SINGLE THYROID NODULE: ICD-10-CM

## 2019-11-28 DIAGNOSIS — E87.1 HYPO-OSMOLALITY AND HYPONATREMIA: ICD-10-CM

## 2019-11-28 DIAGNOSIS — J69.0 PNEUMONITIS DUE TO INHALATION OF FOOD AND VOMIT: ICD-10-CM

## 2019-11-28 DIAGNOSIS — I95.9 HYPOTENSION, UNSPECIFIED: ICD-10-CM
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
## 2019-11-28 DIAGNOSIS — L89.150 PRESSURE ULCER OF SACRAL REGION, UNSTAGEABLE: ICD-10-CM

## 2019-11-28 DIAGNOSIS — Z98.890 OTHER SPECIFIED POSTPROCEDURAL STATES: ICD-10-CM

## 2019-11-28 DIAGNOSIS — Z79.4 LONG TERM (CURRENT) USE OF INSULIN: ICD-10-CM

## 2019-11-28 DIAGNOSIS — S01.01XA LACERATION WITHOUT FOREIGN BODY OF SCALP, INITIAL ENCOUNTER: ICD-10-CM

## 2019-11-28 DIAGNOSIS — E43 UNSPECIFIED SEVERE PROTEIN-CALORIE MALNUTRITION: ICD-10-CM

## 2019-11-28 DIAGNOSIS — R47.01 APHASIA: ICD-10-CM

## 2019-11-28 DIAGNOSIS — Z78.1 PHYSICAL RESTRAINT STATUS: ICD-10-CM

## 2019-11-28 DIAGNOSIS — S60.811A ABRASION OF RIGHT WRIST, INITIAL ENCOUNTER: ICD-10-CM

## 2019-11-28 DIAGNOSIS — Z98.890 OTHER SPECIFIED POSTPROCEDURAL STATES: Chronic | ICD-10-CM

## 2019-11-28 DIAGNOSIS — L89.629 PRESSURE ULCER OF LEFT HEEL, UNSPECIFIED STAGE: ICD-10-CM

## 2019-11-28 DIAGNOSIS — L89.029 PRESSURE ULCER OF LEFT ELBOW, UNSPECIFIED STAGE: ICD-10-CM

## 2019-11-28 LAB
ALBUMIN SERPL ELPH-MCNC: 3.6 G/DL — SIGNIFICANT CHANGE UP (ref 3.3–5)
ALP SERPL-CCNC: 62 U/L — SIGNIFICANT CHANGE UP (ref 40–120)
ALT FLD-CCNC: 15 U/L — SIGNIFICANT CHANGE UP (ref 10–45)
ANION GAP SERPL CALC-SCNC: 9 MMOL/L — SIGNIFICANT CHANGE UP (ref 5–17)
APPEARANCE UR: CLEAR — SIGNIFICANT CHANGE UP
APTT BLD: 31.1 SEC — SIGNIFICANT CHANGE UP (ref 27.5–36.3)
AST SERPL-CCNC: 22 U/L — SIGNIFICANT CHANGE UP (ref 10–40)
B-OH-BUTYR SERPL-SCNC: 0.3 MMOL/L — SIGNIFICANT CHANGE UP
BASE EXCESS BLDV CALC-SCNC: 5 MMOL/L — SIGNIFICANT CHANGE UP
BASOPHILS # BLD AUTO: 0.06 K/UL — SIGNIFICANT CHANGE UP (ref 0–0.2)
BASOPHILS NFR BLD AUTO: 0.8 % — SIGNIFICANT CHANGE UP (ref 0–2)
BILIRUB SERPL-MCNC: 0.4 MG/DL — SIGNIFICANT CHANGE UP (ref 0.2–1.2)
BILIRUB UR-MCNC: NEGATIVE — SIGNIFICANT CHANGE UP
BLD GP AB SCN SERPL QL: NEGATIVE — SIGNIFICANT CHANGE UP
BUN SERPL-MCNC: 17 MG/DL — SIGNIFICANT CHANGE UP (ref 7–23)
CA-I SERPL-SCNC: 1.2 MMOL/L — SIGNIFICANT CHANGE UP (ref 1.12–1.3)
CALCIUM SERPL-MCNC: 9.8 MG/DL — SIGNIFICANT CHANGE UP (ref 8.4–10.5)
CHLORIDE SERPL-SCNC: 100 MMOL/L — SIGNIFICANT CHANGE UP (ref 96–108)
CK SERPL-CCNC: 70 U/L — SIGNIFICANT CHANGE UP (ref 30–200)
CO2 SERPL-SCNC: 30 MMOL/L — SIGNIFICANT CHANGE UP (ref 22–31)
COLOR SPEC: YELLOW — SIGNIFICANT CHANGE UP
CREAT SERPL-MCNC: 0.65 MG/DL — SIGNIFICANT CHANGE UP (ref 0.5–1.3)
DIFF PNL FLD: NEGATIVE — SIGNIFICANT CHANGE UP
EOSINOPHIL # BLD AUTO: 0.03 K/UL — SIGNIFICANT CHANGE UP (ref 0–0.5)
EOSINOPHIL NFR BLD AUTO: 0.4 % — SIGNIFICANT CHANGE UP (ref 0–6)
ETHANOL SERPL-MCNC: <10 MG/DL — SIGNIFICANT CHANGE UP (ref 0–10)
GAS PNL BLDV: 135 MMOL/L — LOW (ref 138–146)
GAS PNL BLDV: SIGNIFICANT CHANGE UP
GLUCOSE BLDC GLUCOMTR-MCNC: 59 MG/DL — LOW (ref 70–99)
GLUCOSE BLDC GLUCOMTR-MCNC: 99 MG/DL — SIGNIFICANT CHANGE UP (ref 70–99)
GLUCOSE SERPL-MCNC: 90 MG/DL — SIGNIFICANT CHANGE UP (ref 70–99)
GLUCOSE UR QL: NEGATIVE — SIGNIFICANT CHANGE UP
HBA1C BLD-MCNC: 7.3 % — HIGH (ref 4–5.6)
HCO3 BLDV-SCNC: 31 MMOL/L — HIGH (ref 20–27)
HCT VFR BLD CALC: 35.9 % — LOW (ref 39–50)
HGB BLD-MCNC: 11.2 G/DL — LOW (ref 13–17)
IMM GRANULOCYTES NFR BLD AUTO: 0.4 % — SIGNIFICANT CHANGE UP (ref 0–1.5)
INR BLD: 1.01 — SIGNIFICANT CHANGE UP (ref 0.88–1.16)
KETONES UR-MCNC: ABNORMAL MG/DL
LACTATE SERPL-SCNC: 1.9 MMOL/L — SIGNIFICANT CHANGE UP (ref 0.5–2)
LACTATE SERPL-SCNC: 5.5 MMOL/L — CRITICAL HIGH (ref 0.5–2)
LEUKOCYTE ESTERASE UR-ACNC: NEGATIVE — SIGNIFICANT CHANGE UP
LYMPHOCYTES # BLD AUTO: 0.71 K/UL — LOW (ref 1–3.3)
LYMPHOCYTES # BLD AUTO: 9.9 % — LOW (ref 13–44)
MCHC RBC-ENTMCNC: 30.5 PG — SIGNIFICANT CHANGE UP (ref 27–34)
MCHC RBC-ENTMCNC: 31.2 GM/DL — LOW (ref 32–36)
MCV RBC AUTO: 97.8 FL — SIGNIFICANT CHANGE UP (ref 80–100)
MONOCYTES # BLD AUTO: 0.72 K/UL — SIGNIFICANT CHANGE UP (ref 0–0.9)
MONOCYTES NFR BLD AUTO: 10.1 % — SIGNIFICANT CHANGE UP (ref 2–14)
MYOGLOBIN SERPL-MCNC: 117 NG/ML — HIGH (ref 16–96)
NEUTROPHILS # BLD AUTO: 5.61 K/UL — SIGNIFICANT CHANGE UP (ref 1.8–7.4)
NEUTROPHILS NFR BLD AUTO: 78.4 % — HIGH (ref 43–77)
NITRITE UR-MCNC: NEGATIVE — SIGNIFICANT CHANGE UP
NRBC # BLD: 0 /100 WBCS — SIGNIFICANT CHANGE UP (ref 0–0)
PCO2 BLDV: 50 MMHG — SIGNIFICANT CHANGE UP (ref 41–51)
PCP SPEC-MCNC: SIGNIFICANT CHANGE UP
PH BLDV: 7.41 — SIGNIFICANT CHANGE UP (ref 7.32–7.43)
PH UR: 8 — SIGNIFICANT CHANGE UP (ref 5–8)
PLATELET # BLD AUTO: 225 K/UL — SIGNIFICANT CHANGE UP (ref 150–400)
PO2 BLDV: 22 MMHG — SIGNIFICANT CHANGE UP
POTASSIUM BLDV-SCNC: 4 MMOL/L — SIGNIFICANT CHANGE UP (ref 3.5–4.9)
POTASSIUM SERPL-MCNC: 5.3 MMOL/L — SIGNIFICANT CHANGE UP (ref 3.5–5.3)
POTASSIUM SERPL-SCNC: 5.3 MMOL/L — SIGNIFICANT CHANGE UP (ref 3.5–5.3)
PROT SERPL-MCNC: 6.1 G/DL — SIGNIFICANT CHANGE UP (ref 6–8.3)
PROT UR-MCNC: NEGATIVE MG/DL — SIGNIFICANT CHANGE UP
PROTHROM AB SERPL-ACNC: 11.4 SEC — SIGNIFICANT CHANGE UP (ref 10–12.9)
RBC # BLD: 3.67 M/UL — LOW (ref 4.2–5.8)
RBC # FLD: 14.6 % — HIGH (ref 10.3–14.5)
RH IG SCN BLD-IMP: POSITIVE — SIGNIFICANT CHANGE UP
SAO2 % BLDV: 32 % — SIGNIFICANT CHANGE UP
SODIUM SERPL-SCNC: 139 MMOL/L — SIGNIFICANT CHANGE UP (ref 135–145)
SP GR SPEC: 1.01 — SIGNIFICANT CHANGE UP (ref 1–1.03)
TROPONIN T SERPL-MCNC: <0.01 NG/ML — SIGNIFICANT CHANGE UP (ref 0–0.01)
UROBILINOGEN FLD QL: 0.2 E.U./DL — SIGNIFICANT CHANGE UP
WBC # BLD: 7.16 K/UL — SIGNIFICANT CHANGE UP (ref 3.8–10.5)
WBC # FLD AUTO: 7.16 K/UL — SIGNIFICANT CHANGE UP (ref 3.8–10.5)

## 2019-11-28 PROCEDURE — 70450 CT HEAD/BRAIN W/O DYE: CPT | Mod: 26

## 2019-11-28 PROCEDURE — 93010 ELECTROCARDIOGRAM REPORT: CPT | Mod: XU

## 2019-11-28 PROCEDURE — 12002 RPR S/N/AX/GEN/TRNK2.6-7.5CM: CPT

## 2019-11-28 PROCEDURE — 99291 CRITICAL CARE FIRST HOUR: CPT | Mod: 25

## 2019-11-28 PROCEDURE — 71045 X-RAY EXAM CHEST 1 VIEW: CPT | Mod: 26

## 2019-11-28 PROCEDURE — 93010 ELECTROCARDIOGRAM REPORT: CPT

## 2019-11-28 PROCEDURE — 99231 SBSQ HOSP IP/OBS SF/LOW 25: CPT

## 2019-11-28 RX ORDER — THIAMINE MONONITRATE (VIT B1) 100 MG
500 TABLET ORAL THREE TIMES A DAY
Refills: 0 | Status: DISCONTINUED | OUTPATIENT
Start: 2019-11-28 | End: 2019-11-29

## 2019-11-28 RX ORDER — GLUCAGON INJECTION, SOLUTION 0.5 MG/.1ML
1 INJECTION, SOLUTION SUBCUTANEOUS ONCE
Refills: 0 | Status: DISCONTINUED | OUTPATIENT
Start: 2019-11-28 | End: 2019-12-16

## 2019-11-28 RX ORDER — ACETAMINOPHEN 500 MG
650 TABLET ORAL EVERY 6 HOURS
Refills: 0 | Status: DISCONTINUED | OUTPATIENT
Start: 2019-11-28 | End: 2019-12-05

## 2019-11-28 RX ORDER — SODIUM CHLORIDE 9 MG/ML
1000 INJECTION INTRAMUSCULAR; INTRAVENOUS; SUBCUTANEOUS ONCE
Refills: 0 | Status: COMPLETED | OUTPATIENT
Start: 2019-11-28 | End: 2019-11-28

## 2019-11-28 RX ORDER — SODIUM CHLORIDE 9 MG/ML
1000 INJECTION INTRAMUSCULAR; INTRAVENOUS; SUBCUTANEOUS
Refills: 0 | Status: DISCONTINUED | OUTPATIENT
Start: 2019-11-28 | End: 2019-11-29

## 2019-11-28 RX ORDER — PIPERACILLIN AND TAZOBACTAM 4; .5 G/20ML; G/20ML
3.38 INJECTION, POWDER, LYOPHILIZED, FOR SOLUTION INTRAVENOUS ONCE
Refills: 0 | Status: COMPLETED | OUTPATIENT
Start: 2019-11-28 | End: 2019-11-28

## 2019-11-28 RX ORDER — LEVETIRACETAM 250 MG/1
1000 TABLET, FILM COATED ORAL EVERY 12 HOURS
Refills: 0 | Status: DISCONTINUED | OUTPATIENT
Start: 2019-11-28 | End: 2019-11-29

## 2019-11-28 RX ORDER — DEXTROSE 50 % IN WATER 50 %
12.5 SYRINGE (ML) INTRAVENOUS ONCE
Refills: 0 | Status: DISCONTINUED | OUTPATIENT
Start: 2019-11-28 | End: 2019-12-16

## 2019-11-28 RX ORDER — LABETALOL HCL 100 MG
10 TABLET ORAL
Refills: 0 | Status: DISCONTINUED | OUTPATIENT
Start: 2019-11-28 | End: 2019-12-06

## 2019-11-28 RX ORDER — SODIUM CHLORIDE 9 MG/ML
1000 INJECTION, SOLUTION INTRAVENOUS
Refills: 0 | Status: DISCONTINUED | OUTPATIENT
Start: 2019-11-28 | End: 2019-12-16

## 2019-11-28 RX ORDER — HYDRALAZINE HCL 50 MG
10 TABLET ORAL
Refills: 0 | Status: DISCONTINUED | OUTPATIENT
Start: 2019-11-28 | End: 2019-12-06

## 2019-11-28 RX ORDER — DEXTROSE 50 % IN WATER 50 %
12.5 SYRINGE (ML) INTRAVENOUS ONCE
Refills: 0 | Status: COMPLETED | OUTPATIENT
Start: 2019-11-28 | End: 2019-11-28

## 2019-11-28 RX ORDER — DEXTROSE 50 % IN WATER 50 %
15 SYRINGE (ML) INTRAVENOUS ONCE
Refills: 0 | Status: DISCONTINUED | OUTPATIENT
Start: 2019-11-28 | End: 2019-12-16

## 2019-11-28 RX ORDER — INSULIN LISPRO 100/ML
VIAL (ML) SUBCUTANEOUS
Refills: 0 | Status: DISCONTINUED | OUTPATIENT
Start: 2019-11-28 | End: 2019-12-16

## 2019-11-28 RX ADMIN — SODIUM CHLORIDE 1000 MILLILITER(S): 9 INJECTION INTRAMUSCULAR; INTRAVENOUS; SUBCUTANEOUS at 17:11

## 2019-11-28 RX ADMIN — SODIUM CHLORIDE 2000 MILLILITER(S): 9 INJECTION INTRAMUSCULAR; INTRAVENOUS; SUBCUTANEOUS at 17:18

## 2019-11-28 RX ADMIN — PIPERACILLIN AND TAZOBACTAM 200 GRAM(S): 4; .5 INJECTION, POWDER, LYOPHILIZED, FOR SOLUTION INTRAVENOUS at 17:23

## 2019-11-28 RX ADMIN — Medication 10 MILLIGRAM(S): at 23:07

## 2019-11-28 RX ADMIN — LEVETIRACETAM 400 MILLIGRAM(S): 250 TABLET, FILM COATED ORAL at 17:58

## 2019-11-28 RX ADMIN — Medication 12.5 GRAM(S): at 22:31

## 2019-11-28 NOTE — H&P ADULT - ASSESSMENT
a/p: 89 y.o male pmhx DM GCS 13, HH 2, with traumatic left SDH/SAH s/p fall? after being found down, possible post concussive or having seziures  admit to neuro ICU for q1h neuro checks  keppra 1g   consider eeg tomorrow  rpt imaging 6 hrs   hob 30 deg   NPO for now  f/u cxr  enc IS use  morrissey to gravity  NS hydration  rpt lactate this evening  received 1 dose of zosyn  will f/u infectious work up  trend labs  SCDS only for now  case and images reviewed and d/w Dr. Morin

## 2019-11-28 NOTE — H&P ADULT - NSHPLABSRESULTS_GEN_ALL_CORE
[facility]   < from: CT Head No Cont (11.28.19 @ 17:03) >    Acute left subdural hematoma in the temporal region in association with   areas of superficial subarachnoid hemorrhage in the left temporal lobe   consistent with contrecoup injury given areas of right frontotemporal   scalp hematoma related to recent fall.    < end of copied text >

## 2019-11-28 NOTE — ED ADULT NURSE NOTE - NSIMPLEMENTINTERV_GEN_ALL_ED
Implemented All Fall with Harm Risk Interventions:  [place] to call system. Call bell, personal items and telephone within reach. Instruct patient to call for assistance. Room bathroom lighting operational. Non-slip footwear when patient is off stretcher. Physically safe environment: no spills, clutter or unnecessary equipment. Stretcher in lowest position, wheels locked, appropriate side rails in place. Provide visual cue, wrist band, yellow gown, etc. Monitor gait and stability. Monitor for mental status changes and reorient to person, place, and time. Review medications for side effects contributing to fall risk. Reinforce activity limits and safety measures with patient and family. Provide visual clues: red socks.

## 2019-11-28 NOTE — ED ADULT NURSE NOTE - OBJECTIVE STATEMENT
89 year old M patient, A+O to self, not time situation or location.  Presents to ED after BIBA for unwitnessed fall.  EMS called by doorman after the patient caleld the doorman for help, unknown baseline but as per nay patient is altered for his normal self.  Pt responsive to all stimuli tactile & verbal but not innappropriate responses to questions.  Pt noted to have laceration to R side of head, bleeding controlled and small abrasion to R wrist.  No other signs of trauma or deformities.  Pt breathing easily and unlabored.  Upgraded to Dr Neal, #18 G placed to Lac, flushing well no redness swelling or pain to site, blood work sent, taken to CT Scan and EKG in progress.  No  distress noted.

## 2019-11-28 NOTE — ED PROVIDER NOTE - CARE PLAN
Principal Discharge DX:	Subarachnoid bleed  Secondary Diagnosis:	Sepsis, due to unspecified organism, unspecified whether acute organ dysfunction present

## 2019-11-28 NOTE — ED PROVIDER NOTE - ENMT, MLM
Airway patent, Nasal mucosa clear. markedly dry MM. Throat has no vesicles, no oropharyngeal exudates and uvula is midline.  R frontal hematoma 2 x 3 cm- with 3 cm lac to superior portion of wound

## 2019-11-28 NOTE — H&P ADULT - HISTORY OF PRESENT ILLNESS
89 yo male pmhx DM  BIBEMS after being found down for unknown period of time by doorman who called ambulance for AMS. On admission patient noted to have scalp lacerations and wrist abrasion, CTH c/w small traumatic SAH with small SDH component. Patient is a poor historian, unable to provide ROS. Denies any pain. No family with patient. Minimal collateral EMR information.

## 2019-11-28 NOTE — ED PROVIDER NOTE - OBJECTIVE STATEMENT
89 M w AMS- hx of DM- doorman called- obvious head injury- last hematoma/lac to R side of the head- px lives alone normal alert and oriented  nonverbal- eyes open- not following commands  severe- no exac/allev factors  onset unknown

## 2019-11-28 NOTE — ED PROVIDER NOTE - NS ED MD DISPO ISOLATION TYPES
Discussed results. Patient still complaints of discharge. Patient is advised to complete the course of doxycycline and return next week if the condition persists.
None

## 2019-11-28 NOTE — ED PROVIDER NOTE - DIAGNOSTIC INTERPRETATION
ER Physician: Power Neal  CHEST XRAY INTERPRETATION: lungs clear, heart shadow normal, bony structures intact

## 2019-11-28 NOTE — CHART NOTE - NSCHARTNOTEFT_GEN_A_CORE
Neurosurgical Indications for Screening Dopplers on Admission:       1) Known hypercoagulation disorder (h/o VTE, thrombophilia, HIT, etc.)   2) Admitted from prolonged stay from another institution (straight forward ER transfers not included)  3) Presenting with significant leg immobility   4) Presenting with signs and symptoms of VTE?    5) With significant critical illness, Including "found down" for unknown period of time in HPI  6) With significant neurotrauma (TBI, SCI / TLS spine fractures)   7) Who are comatose   8) With known malignancy (e.g. glioblastoma multiforme, meningioma, etc.). Excludes IA chemo 23hr observation stays  9) On hemodialysis   10) Who have received platelet transfusion or antithrombotic reversal agents recently   11) Who have had recent major orthopedic surgery          Screening dopplers indicated?   Y x_   N _    DVT Prophylaxis:  _x SCD's   _ chemoprophylaxis

## 2019-11-28 NOTE — ED ADULT TRIAGE NOTE - CHIEF COMPLAINT QUOTE
patient BIBA from home. as per EMS patient called the doorman of his building after falling. patient is confused and does not know what happened. per patient records he was at [facility] 3 weeks ago for diarrhea. denies headache, dizziness. laceration to head, bleeding controlled. unknown LOC

## 2019-11-28 NOTE — ED ADULT NURSE NOTE - CHPI ED NUR SYMPTOMS NEG
no chills/no fever/no vomiting/no decreased eating/drinking/no tingling/no dizziness/no nausea/no weakness

## 2019-11-28 NOTE — ED PROVIDER NOTE - CRITICAL CARE PROVIDED
additional history taking/consult w/ pt's family directly relating to pts condition/consultation with other physicians/interpretation of diagnostic studies/direct patient care (not related to procedure)/documentation

## 2019-11-28 NOTE — ED ADULT NURSE NOTE - CHIEF COMPLAINT QUOTE
patient BIBA from home. as per EMS patient called the doorman of his building after falling. patient is confused and does not know what happened. per patient records he was at [facility] 3 weeks ago for diarrhea. denies headache, dizziness. laceration to head, bleeding controlled. unknown LOC

## 2019-11-28 NOTE — H&P ADULT - NSHPPHYSICALEXAM_GEN_ALL_CORE
awake, confused speech, poor dentition, cachetic   GCS 13, confused  PERRL, EOMI  hard of hearing, no focal deficits  right temporalparietal scalp laceration repaired with staple  obvious right wrist abrasion no crepitus or weakness  RRR  abd soft NTND  no obvious spine deformity  morrissey in place  distal pulses intact b/l

## 2019-11-28 NOTE — H&P ADULT - ATTENDING COMMENTS
Patient seen and examined by me. He was found down with altered mentation. Etiology of altered mentation unclear, possible post-concussive versus seizure. Initial head CT with left temporal hemorrhagic contusion, small subdural hematoma, and traumatic subarachnoid hemorrhage. Initial management plan detailed above for AED, EEG, repeat head imaging, ICU supportive care, trend lactate and labs as part of sepsis protocol.    Eduardo Morin M.D.

## 2019-11-29 LAB
ANION GAP SERPL CALC-SCNC: 12 MMOL/L — SIGNIFICANT CHANGE UP (ref 5–17)
BUN SERPL-MCNC: 10 MG/DL — SIGNIFICANT CHANGE UP (ref 7–23)
CALCIUM SERPL-MCNC: 8.8 MG/DL — SIGNIFICANT CHANGE UP (ref 8.4–10.5)
CHLORIDE SERPL-SCNC: 100 MMOL/L — SIGNIFICANT CHANGE UP (ref 96–108)
CHOLEST SERPL-MCNC: 144 MG/DL — SIGNIFICANT CHANGE UP (ref 10–199)
CK SERPL-CCNC: 89 U/L — SIGNIFICANT CHANGE UP (ref 30–200)
CO2 SERPL-SCNC: 25 MMOL/L — SIGNIFICANT CHANGE UP (ref 22–31)
CREAT SERPL-MCNC: 0.56 MG/DL — SIGNIFICANT CHANGE UP (ref 0.5–1.3)
CULTURE RESULTS: NO GROWTH — SIGNIFICANT CHANGE UP
GLUCOSE BLDC GLUCOMTR-MCNC: 132 MG/DL — HIGH (ref 70–99)
GLUCOSE BLDC GLUCOMTR-MCNC: 139 MG/DL — HIGH (ref 70–99)
GLUCOSE BLDC GLUCOMTR-MCNC: 196 MG/DL — HIGH (ref 70–99)
GLUCOSE BLDC GLUCOMTR-MCNC: 240 MG/DL — HIGH (ref 70–99)
GLUCOSE SERPL-MCNC: 139 MG/DL — HIGH (ref 70–99)
HBA1C BLD-MCNC: 7 % — HIGH (ref 4–5.6)
HCT VFR BLD CALC: 35.3 % — LOW (ref 39–50)
HDLC SERPL-MCNC: 86 MG/DL — SIGNIFICANT CHANGE UP
HGB BLD-MCNC: 11.4 G/DL — LOW (ref 13–17)
LIPID PNL WITH DIRECT LDL SERPL: 49 MG/DL — SIGNIFICANT CHANGE UP
MAGNESIUM SERPL-MCNC: 1.9 MG/DL — SIGNIFICANT CHANGE UP (ref 1.6–2.6)
MCHC RBC-ENTMCNC: 31 PG — SIGNIFICANT CHANGE UP (ref 27–34)
MCHC RBC-ENTMCNC: 32.3 GM/DL — SIGNIFICANT CHANGE UP (ref 32–36)
MCV RBC AUTO: 95.9 FL — SIGNIFICANT CHANGE UP (ref 80–100)
NRBC # BLD: 0 /100 WBCS — SIGNIFICANT CHANGE UP (ref 0–0)
PHOSPHATE SERPL-MCNC: 2.8 MG/DL — SIGNIFICANT CHANGE UP (ref 2.5–4.5)
PLATELET # BLD AUTO: 209 K/UL — SIGNIFICANT CHANGE UP (ref 150–400)
POTASSIUM SERPL-MCNC: 4.6 MMOL/L — SIGNIFICANT CHANGE UP (ref 3.5–5.3)
POTASSIUM SERPL-SCNC: 4.6 MMOL/L — SIGNIFICANT CHANGE UP (ref 3.5–5.3)
RBC # BLD: 3.68 M/UL — LOW (ref 4.2–5.8)
RBC # FLD: 14.6 % — HIGH (ref 10.3–14.5)
SODIUM SERPL-SCNC: 137 MMOL/L — SIGNIFICANT CHANGE UP (ref 135–145)
SPECIMEN SOURCE: SIGNIFICANT CHANGE UP
TOTAL CHOLESTEROL/HDL RATIO MEASUREMENT: 1.7 RATIO — LOW (ref 3.4–9.6)
TRIGL SERPL-MCNC: 47 MG/DL — SIGNIFICANT CHANGE UP (ref 10–149)
WBC # BLD: 11.88 K/UL — HIGH (ref 3.8–10.5)
WBC # FLD AUTO: 11.88 K/UL — HIGH (ref 3.8–10.5)

## 2019-11-29 PROCEDURE — 93970 EXTREMITY STUDY: CPT | Mod: 26

## 2019-11-29 PROCEDURE — 99291 CRITICAL CARE FIRST HOUR: CPT

## 2019-11-29 PROCEDURE — 71045 X-RAY EXAM CHEST 1 VIEW: CPT | Mod: 26

## 2019-11-29 PROCEDURE — 70450 CT HEAD/BRAIN W/O DYE: CPT | Mod: 26,59

## 2019-11-29 PROCEDURE — 70496 CT ANGIOGRAPHY HEAD: CPT | Mod: 26

## 2019-11-29 PROCEDURE — 70450 CT HEAD/BRAIN W/O DYE: CPT | Mod: 26,59,77

## 2019-11-29 PROCEDURE — 72125 CT NECK SPINE W/O DYE: CPT | Mod: 26

## 2019-11-29 PROCEDURE — 99232 SBSQ HOSP IP/OBS MODERATE 35: CPT

## 2019-11-29 PROCEDURE — 70498 CT ANGIOGRAPHY NECK: CPT | Mod: 26

## 2019-11-29 RX ORDER — MAGNESIUM OXIDE 400 MG ORAL TABLET 241.3 MG
800 TABLET ORAL ONCE
Refills: 0 | Status: DISCONTINUED | OUTPATIENT
Start: 2019-11-29 | End: 2019-11-29

## 2019-11-29 RX ORDER — SODIUM CHLORIDE 5 G/100ML
250 INJECTION, SOLUTION INTRAVENOUS
Refills: 0 | Status: DISCONTINUED | OUTPATIENT
Start: 2019-11-29 | End: 2019-11-29

## 2019-11-29 RX ORDER — THIAMINE MONONITRATE (VIT B1) 100 MG
500 TABLET ORAL DAILY
Refills: 0 | Status: COMPLETED | OUTPATIENT
Start: 2019-11-29 | End: 2019-12-01

## 2019-11-29 RX ORDER — MAGNESIUM SULFATE 500 MG/ML
2 VIAL (ML) INJECTION ONCE
Refills: 0 | Status: COMPLETED | OUTPATIENT
Start: 2019-11-29 | End: 2019-11-29

## 2019-11-29 RX ORDER — HALOPERIDOL DECANOATE 100 MG/ML
1 INJECTION INTRAMUSCULAR ONCE
Refills: 0 | Status: COMPLETED | OUTPATIENT
Start: 2019-11-29 | End: 2019-11-29

## 2019-11-29 RX ORDER — LEVETIRACETAM 250 MG/1
1000 TABLET, FILM COATED ORAL
Refills: 0 | Status: DISCONTINUED | OUTPATIENT
Start: 2019-11-29 | End: 2019-12-02

## 2019-11-29 RX ORDER — SODIUM,POTASSIUM PHOSPHATES 278-250MG
1 POWDER IN PACKET (EA) ORAL
Refills: 0 | Status: DISCONTINUED | OUTPATIENT
Start: 2019-11-29 | End: 2019-11-29

## 2019-11-29 RX ADMIN — SODIUM CHLORIDE 250 MILLILITER(S): 5 INJECTION, SOLUTION INTRAVENOUS at 10:00

## 2019-11-29 RX ADMIN — LEVETIRACETAM 400 MILLIGRAM(S): 250 TABLET, FILM COATED ORAL at 06:07

## 2019-11-29 RX ADMIN — Medication 105 MILLIGRAM(S): at 14:00

## 2019-11-29 RX ADMIN — Medication 105 MILLIGRAM(S): at 06:42

## 2019-11-29 RX ADMIN — Medication 10 MILLIGRAM(S): at 11:45

## 2019-11-29 RX ADMIN — HALOPERIDOL DECANOATE 1 MILLIGRAM(S): 100 INJECTION INTRAMUSCULAR at 12:19

## 2019-11-29 RX ADMIN — Medication 50 GRAM(S): at 09:31

## 2019-11-29 RX ADMIN — Medication 2: at 11:54

## 2019-11-29 RX ADMIN — Medication 4: at 21:45

## 2019-11-29 RX ADMIN — LEVETIRACETAM 400 MILLIGRAM(S): 250 TABLET, FILM COATED ORAL at 18:00

## 2019-11-29 NOTE — PROGRESS NOTE ADULT - SUBJECTIVE AND OBJECTIVE BOX
S/Overnight events:    admitted to ICU from ED. Scalp laceration repaired. Rpt imaging done, stable.     Hospital Course:   HD # 0: admitted to ICU from ED. rpt imaging stable  HD #1: rpt CTH obtained for stability. Exam unchanged, remains aphasic.      Vital Signs Last 24 Hrs  T(C): 36.7 (2019 04:21), Max: 36.9 (2019 16:41)  T(F): 98.1 (2019 04:21), Max: 98.5 (2019 16:41)  HR: 78 (2019 08:00) (66 - 90)  BP: 128/62 (2019 08:00) (108/72 - 168/64)  BP(mean): 82 (2019 08:) (76 - 111)  RR: 15 (2019 08:00) (14 - 25)  SpO2: 100% (2019 08:) (98% - 100%)    I&O's Detail    2019 07:  -  2019 07:00  --------------------------------------------------------  IN:    IV PiggyBack: 200 mL    sodium chloride 0.9%.: 1050 mL  Total IN: 1250 mL    OUT:    Indwelling Catheter - Urethral: 1720 mL    Voided: 875 mL  Total OUT: 2595 mL    Total NET: -1345 mL      2019 07:  -  2019 08:56  --------------------------------------------------------  IN:    sodium chloride 0.9%.: 150 mL  Total IN: 150 mL    OUT:    Indwelling Catheter - Urethral: 200 mL  Total OUT: 200 mL    Total NET: -50 mL        I&O's Summary    2019 07:  -  2019 07:00  --------------------------------------------------------  IN: 1250 mL / OUT: 2595 mL / NET: -1345 mL    2019 07:01  -  2019 08:56  --------------------------------------------------------  IN: 150 mL / OUT: 200 mL / NET: -50 mL        PHYSICAL EXAM:  awakens to voice, aphasic  opens eyes spont, PERRL, EOMI  AVILES x 4, localizes, not following commands  RRR  CTA b/l  abd soft NTND  morrissey in place  distal pulses intact    DEVICE/DRAIN DRESSING:  n/a    TUBES/LINES:  pivs    DIET:  [x] NPO  [] Mechanical  [] Tube feeds    LABS:                        11.4   11.88 )-----------( 209      ( 2019 05:15 )             35.3         137  |  100  |  10  ----------------------------<  139<H>  4.6   |  25  |  0.56    Ca    8.8      2019 05:15  Phos  2.8       Mg     1.9         TPro  6.1  /  Alb  3.6  /  TBili  0.4  /  DBili  x   /  AST  22  /  ALT  15  /  AlkPhos  62      PT/INR - ( 2019 16:54 )   PT: 11.4 sec;   INR: 1.01          PTT - ( 2019 16:54 )  PTT:31.1 sec  Urinalysis Basic - ( 2019 17:41 )    Color: Yellow / Appearance: Clear / S.015 / pH: x  Gluc: x / Ketone: Trace mg/dL  / Bili: Negative / Urobili: 0.2 E.U./dL   Blood: x / Protein: NEGATIVE mg/dL / Nitrite: NEGATIVE   Leuk Esterase: NEGATIVE / RBC: x / WBC x   Sq Epi: x / Non Sq Epi: x / Bacteria: x      CARDIAC MARKERS ( 2019 16:54 )  x     / <0.01 ng/mL / 70 U/L / x     / x          CAPILLARY BLOOD GLUCOSE      POCT Blood Glucose.: 139 mg/dL (2019 06:29)  POCT Blood Glucose.: 99 mg/dL (2019 22:47)  POCT Blood Glucose.: 59 mg/dL (2019 22:11)  POCT Blood Glucose.: 80 mg/dL (2019 16:30)      Drug Levels: [] N/A    CSF Analysis: [] N/A      Allergies    No Known Allergies    Intolerances      MEDICATIONS:  Antibiotics:    Neuro:  acetaminophen   Tablet .. 650 milliGRAM(s) Oral every 6 hours PRN  levETIRAcetam  IVPB 1000 milliGRAM(s) IV Intermittent every 12 hours    Anticoagulation:    OTHER:  dextrose 40% Gel 15 Gram(s) Oral once PRN  dextrose 50% Injectable 12.5 Gram(s) IV Push once  glucagon  Injectable 1 milliGRAM(s) IntraMuscular once PRN  hydrALAZINE Injectable 10 milliGRAM(s) IV Push every 1 hour PRN  insulin lispro (HumaLOG) corrective regimen sliding scale   SubCutaneous Before meals and at bedtime  labetalol Injectable 10 milliGRAM(s) IV Push every 1 hour PRN    IVF:  dextrose 5%. 1000 milliLiter(s) IV Continuous <Continuous>  magnesium oxide 800 milliGRAM(s) Oral once  potassium phosphate / sodium phosphate powder 1 Packet(s) Oral four times a day  sodium chloride 0.9%. 1000 milliLiter(s) IV Continuous <Continuous>  thiamine IVPB 500 milliGRAM(s) IV Intermittent three times a day    CULTURES:  Culture Results:   No growth at 12 hours ( @ 19:44)  Culture Results:   No growth at 12 hours ( @ 19:44)    RADIOLOGY & ADDITIONAL TESTS:  < from: CT Head No Cont (19 @ 00:31) >  IMPRESSION: Unchanged left temporal convexity subdural hematoma with new   development of subdural hematoma posterior to the occipital lobe.    < end of copied text >      ASSESSMENT:  89y Male s/p TBI stable on rpt imaging, concern for concussion vs seizures    SUBARACHNOID BLEED  No pertinent family history in first degree relatives  Handoff  MEWS Score  DM (diabetes mellitus)  Subarachnoid bleed  H/O hernia repair  AMS  21  Sepsis, due to unspecified organism, unspecified whether acute organ dysfunction present      PLAN:  NEURO:  q1h vitals  tylenol prn  rpt CTH today  clear c collar  vEEG  cont keppra 1g BID    CARDIOVASCULAR:  stable  lactate cleared    PULMONARY:  RA    RENAL:  NS hydration  dc morrissey    GI:  NPO   bedside dysphagia eval    HEME:  stable    ID:  afeb  no abx  f/u blood cultures    ENDO:  ISS    DVT PROPHYLAXIS:  [x] Venodynes                                [] Heparin/Lovenox    FALL RISK:  [] Low Risk                                    [] Impulsive    DISPOSITION:   ICU status  full code  pt/ot pending  d/w Dr. Morin and Vivi    Assessment:  Present when checked    []  GCS  E   V  M     Heart Failure: []Acute, [] acute on chronic , []chronic  Heart Failure:  [] Diastolic (HFpEF), [] Systolic (HFrEF), []Combined (HFpEF and HFrEF), [] RHF, [] Pulm HTN, [] Other    [] REY, [] ATN, [] AIN, [] other  [] CKD1, [] CKD2, [] CKD 3, [] CKD 4, [] CKD 5, []ESRD    Encephalopathy: [] Metabolic, [] Hepatic, [] toxic, [] Neurological, [] Other    Abnormal Nurtitional Status: [] malnurtition (see nutrition note), [ ]underweight: BMI < 19, [] morbid obesity: BMI >40, [] Cachexia    [] Sepsis  [] hypovolemic shock,[] cardiogenic shock, [] hemorrhagic shock, [] neuogenic shock  [] Acute Respiratory Failure  []Cerebral edema, [] Brain compression/ herniation,   [] Functional quadriplegia  [] Acute blood loss anemia

## 2019-11-29 NOTE — PROGRESS NOTE ADULT - SUBJECTIVE AND OBJECTIVE BOX
88 yo male pmhx DM  BIBEMS after being found down for unknown period of time by doorman who called ambulance for AMS. On admission patient noted to have scalp lacerations and wrist abrasion, CTH c/w small traumatic SAH with small SDH component. Patient is a poor historian, unable to provide ROS. Denies any pain. No family with patient. Minimal collateral EMR information.     PMH: dM per chart, unable to get other hx    Allergies: No Known Allergies      Exam:   Gen: edlerly man in c-collar  CV: RRR  Pulm: CTA b/l    MSE: awake, attends briskly, answers "yes" to many questions total wernike type aphasia with some fluent aphasia and nonsense words. Does nto follow any specific commands but does mimic  CN: NATALIE, EOMI no nystagmus, face symmetric, TUP midline  Motor: 5/5 b/l, no drift    VITALS:  T(C): 36.7 (19 @ 04:21), Max: 36.9 (19 @ 16:41)  HR: 78 (19 @ 08:00) (66 - 90)  BP: 128/62 (19 @ 08:00) (108/72 - 168/64)  RR: 15 (19 @ 08:00) (14 - 25)  SpO2: 100% (19 @ 08:00) (98% - 100%)        MEDICATIONS:  acetaminophen   Tablet .. 650 milliGRAM(s) Oral every 6 hours PRN  dextrose 40% Gel 15 Gram(s) Oral once PRN  dextrose 5%. 1000 milliLiter(s) IV Continuous <Continuous>  dextrose 50% Injectable 12.5 Gram(s) IV Push once  glucagon  Injectable 1 milliGRAM(s) IntraMuscular once PRN  hydrALAZINE Injectable 10 milliGRAM(s) IV Push every 1 hour PRN  insulin lispro (HumaLOG) corrective regimen sliding scale   SubCutaneous Before meals and at bedtime  labetalol Injectable 10 milliGRAM(s) IV Push every 1 hour PRN  levETIRAcetam  IVPB 1000 milliGRAM(s) IV Intermittent every 12 hours  magnesium oxide 800 milliGRAM(s) Oral once  potassium phosphate / sodium phosphate powder 1 Packet(s) Oral four times a day  sodium chloride 0.9%. 1000 milliLiter(s) IV Continuous <Continuous>  thiamine IVPB 500 milliGRAM(s) IV Intermittent three times a day      I/O's    19 @ 07:01  -  19 @ 07:00  --------------------------------------------------------  IN: 1250 mL / OUT: 2595 mL / NET: -1345 mL    19 @ 07:01  -  19 @ 08:57  --------------------------------------------------------  IN: 150 mL / OUT: 200 mL / NET: -50 mL        LABS:                          11.4   11.88 )-----------( 209      ( 2019 05:15 )             35.3         137  |  100  |  10  ----------------------------<  139<H>  4.6   |  25  |  0.56    Ca    8.8      2019 05:15  Phos  2.8       Mg     1.9         TPro  6.1  /  Alb  3.6  /  TBili  0.4  /  DBili  x   /  AST  22  /  ALT  15  /  AlkPhos  62    PT/INR - ( 2019 16:54 )   PT: 11.4 sec;   INR: 1.01          PTT - ( 2019 16:54 )  PTT:31.1 sec  Urinalysis Basic - ( 2019 17:41 )    Color: Yellow / Appearance: Clear / S.015 / pH: x  Gluc: x / Ketone: Trace mg/dL  / Bili: Negative / Urobili: 0.2 E.U./dL   Blood: x / Protein: NEGATIVE mg/dL / Nitrite: NEGATIVE   Leuk Esterase: NEGATIVE / RBC: x / WBC x   Sq Epi: x / Non Sq Epi: x / Bacteria: x      CARDIAC MARKERS ( 2019 16:54 )  x     / <0.01 ng/mL / 70 U/L / x     / x              CAPILLARY BLOOD GLUCOSE      POCT Blood Glucose.: 139 mg/dL (2019 06:29)  POCT Blood Glucose.: 99 mg/dL (2019 22:47)  POCT Blood Glucose.: 59 mg/dL (2019 22:11)  POCT Blood Glucose.: 80 mg/dL (2019 16:30)      Culture - Blood (collected 19 @ 19:44)  Source: .Blood Blood-Peripheral  Preliminary Report (19 @ 08:00):    No growth at 12 hours    Culture - Blood (collected 19 @ 19:44)  Source: .Blood Blood-Peripheral  Preliminary Report (19 @ 08:00):    No growth at 12 hours 89 yo male pmhx DM  BIBEMS after being found down for unknown period of time by doorman who called ambulance for AMS. On admission patient noted to have scalp lacerations and wrist abrasion, CTH c/w small traumatic SAH with small SDH component. Patient is a poor historian, unable to provide ROS. Denies any pain. No family with patient. Minimal collateral EMR information.     Overnight events: repeat CTH showing new small SDH in left posterior partietooccipital lobe.     PMH: dM per chart, unable to get other hx    Allergies: No Known Allergies      Exam:   Gen: edlerly man in c-collar  CV: RRR  Pulm: CTA b/l    MSE: awake, attends briskly, answers "yes" to many questions total wernike type aphasia with some fluent aphasia and nonsense words. Does nto follow any specific commands but does mimic  CN: NATALIE, EOMI no nystagmus, face symmetric, TUP midline  Motor: 5/5 b/l, no drift    VITALS:  T(C): 36.7 (19 @ 04:21), Max: 36.9 (19 @ 16:41)  HR: 78 (19 @ 08:00) (66 - 90)  BP: 128/62 (19 @ 08:00) (108/72 - 168/64)  RR: 15 (19 @ 08:00) (14 - 25)  SpO2: 100% (19 @ 08:00) (98% - 100%)        MEDICATIONS:  acetaminophen   Tablet .. 650 milliGRAM(s) Oral every 6 hours PRN  dextrose 40% Gel 15 Gram(s) Oral once PRN  dextrose 5%. 1000 milliLiter(s) IV Continuous <Continuous>  dextrose 50% Injectable 12.5 Gram(s) IV Push once  glucagon  Injectable 1 milliGRAM(s) IntraMuscular once PRN  hydrALAZINE Injectable 10 milliGRAM(s) IV Push every 1 hour PRN  insulin lispro (HumaLOG) corrective regimen sliding scale   SubCutaneous Before meals and at bedtime  labetalol Injectable 10 milliGRAM(s) IV Push every 1 hour PRN  levETIRAcetam  IVPB 1000 milliGRAM(s) IV Intermittent every 12 hours  magnesium oxide 800 milliGRAM(s) Oral once  potassium phosphate / sodium phosphate powder 1 Packet(s) Oral four times a day  sodium chloride 0.9%. 1000 milliLiter(s) IV Continuous <Continuous>  thiamine IVPB 500 milliGRAM(s) IV Intermittent three times a day      I/O's    19 @ 07:01  -  19 @ 07:00  --------------------------------------------------------  IN: 1250 mL / OUT: 2595 mL / NET: -1345 mL    19 @ 07:01  -  19 @ 08:57  --------------------------------------------------------  IN: 150 mL / OUT: 200 mL / NET: -50 mL        LABS:                          11.4   11.88 )-----------( 209      ( 2019 05:15 )             35.3         137  |  100  |  10  ----------------------------<  139<H>  4.6   |  25  |  0.56    Ca    8.8      2019 05:15  Phos  2.8       Mg     1.9         TPro  6.1  /  Alb  3.6  /  TBili  0.4  /  DBili  x   /  AST  22  /  ALT  15  /  AlkPhos  62    PT/INR - ( 2019 16:54 )   PT: 11.4 sec;   INR: 1.01          PTT - ( 2019 16:54 )  PTT:31.1 sec  Urinalysis Basic - ( 2019 17:41 )    Color: Yellow / Appearance: Clear / S.015 / pH: x  Gluc: x / Ketone: Trace mg/dL  / Bili: Negative / Urobili: 0.2 E.U./dL   Blood: x / Protein: NEGATIVE mg/dL / Nitrite: NEGATIVE   Leuk Esterase: NEGATIVE / RBC: x / WBC x   Sq Epi: x / Non Sq Epi: x / Bacteria: x      CARDIAC MARKERS ( 2019 16:54 )  x     / <0.01 ng/mL / 70 U/L / x     / x              CAPILLARY BLOOD GLUCOSE      POCT Blood Glucose.: 139 mg/dL (2019 06:29)  POCT Blood Glucose.: 99 mg/dL (2019 22:47)  POCT Blood Glucose.: 59 mg/dL (2019 22:11)  POCT Blood Glucose.: 80 mg/dL (2019 16:30)      Culture - Blood (collected 19 @ 19:44)  Source: .Blood Blood-Peripheral  Preliminary Report (19 @ 08:00):    No growth at 12 hours    Culture - Blood (collected 19 @ 19:44)  Source: .Blood Blood-Peripheral  Preliminary Report (19 @ 08:00):    No growth at 12 hours 88 yo male pmhx DM  BIBEMS after being found down for unknown period of time by doorman who called ambulance for AMS. On admission patient noted to have scalp lacerations and wrist abrasion, CTH c/w small traumatic SAH with small SDH component. Patient is a poor historian, unable to provide ROS. Denies any pain. No family with patient. Minimal collateral EMR information.     Overnight events: repeat CTH showing new small SDH in left posterior partietooccipital lobe.     PMH: dM per chart, unable to get other hx    Allergies: No Known Allergies      Exam:   Gen: edlerly man in c-collar  CV: RRR  Pulm: CTA b/l    MSE: awake, attends briskly, answers "yes" to many questions total wernike type aphasia with some fluent aphasia and nonsense words. Does nto follow any specific commands but does mimic  CN: NATALIE, EOMI no nystagmus, face symmetric, TUP midline  Motor: 5/5 b/l, no drift    VITALS:  T(C): 36.7 (19 @ 04:21), Max: 36.9 (19 @ 16:41)  HR: 78 (19 @ 08:00) (66 - 90)  BP: 128/62 (19 @ 08:00) (108/72 - 168/64)  RR: 15 (19 @ 08:00) (14 - 25)  SpO2: 100% (19 @ 08:00) (98% - 100%)        MEDICATIONS:  acetaminophen   Tablet .. 650 milliGRAM(s) Oral every 6 hours PRN  dextrose 40% Gel 15 Gram(s) Oral once PRN  dextrose 5%. 1000 milliLiter(s) IV Continuous <Continuous>  dextrose 50% Injectable 12.5 Gram(s) IV Push once  glucagon  Injectable 1 milliGRAM(s) IntraMuscular once PRN  hydrALAZINE Injectable 10 milliGRAM(s) IV Push every 1 hour PRN  insulin lispro (HumaLOG) corrective regimen sliding scale   SubCutaneous Before meals and at bedtime  labetalol Injectable 10 milliGRAM(s) IV Push every 1 hour PRN  levETIRAcetam  IVPB 1000 milliGRAM(s) IV Intermittent every 12 hours  magnesium oxide 800 milliGRAM(s) Oral once  potassium phosphate / sodium phosphate powder 1 Packet(s) Oral four times a day  sodium chloride 0.9%. 1000 milliLiter(s) IV Continuous <Continuous>  thiamine IVPB 500 milliGRAM(s) IV Intermittent three times a day      I/O's    19 @ 07:01  -  19 @ 07:00  --------------------------------------------------------  IN: 1250 mL / OUT: 2595 mL / NET: -1345 mL    19 @ 07:01  -  19 @ 08:57  --------------------------------------------------------  IN: 150 mL / OUT: 200 mL / NET: -50 mL        LABS:                          11.4   11.88 )-----------( 209      ( 2019 05:15 )             35.3         137  |  100  |  10  ----------------------------<  139<H>  4.6   |  25  |  0.56    Ca    8.8      2019 05:15  Phos  2.8       Mg     1.9         TPro  6.1  /  Alb  3.6  /  TBili  0.4  /  DBili  x   /  AST  22  /  ALT  15  /  AlkPhos  62    PT/INR - ( 2019 16:54 )   PT: 11.4 sec;   INR: 1.01          PTT - ( 2019 16:54 )  PTT:31.1 sec  Urinalysis Basic - ( 2019 17:41 )    Color: Yellow / Appearance: Clear / S.015 / pH: x  Gluc: x / Ketone: Trace mg/dL  / Bili: Negative / Urobili: 0.2 E.U./dL   Blood: x / Protein: NEGATIVE mg/dL / Nitrite: NEGATIVE   Leuk Esterase: NEGATIVE / RBC: x / WBC x   Sq Epi: x / Non Sq Epi: x / Bacteria: x      CARDIAC MARKERS ( 2019 16:54 )  x     / <0.01 ng/mL / 70 U/L / x     / x              CAPILLARY BLOOD GLUCOSE      POCT Blood Glucose.: 139 mg/dL (2019 06:29)  POCT Blood Glucose.: 99 mg/dL (2019 22:47)  POCT Blood Glucose.: 59 mg/dL (2019 22:11)  POCT Blood Glucose.: 80 mg/dL (2019 16:30)      Culture - Blood (collected 19 @ 19:44)  Source: .Blood Blood-Peripheral  Preliminary Report (19 @ 08:00):    No growth at 12 hours    Culture - Blood (collected 19 @ 19:44)  Source: .Blood Blood-Peripheral  Preliminary Report (19 @ 08:00):    No growth at 12 hours        Stroke core measures - TRAUMATIC SAH+SDH  -No indication for antiplatelet agent, echocardiogram, telemetry monitoring, telemetry monitoring, statin, lipid profile, or A1c  -Hunt-Zelaya scoring or nimodipine not applicable in traumatic hemorrhage

## 2019-11-29 NOTE — PROGRESS NOTE ADULT - ASSESSMENT
89/M with DM found down with traumatic SAH.  Unknown medical history. Multifocal blood including left anterior temporal lboe and small trauamtic SDH in left parietoocciptal region. Focal aphasia concerning for stroke or other focal lesion.     Neuro:  -repeat CTH now - include CTA given focal findings  -EEG after CT  -Eventually need MRI if no focality on repeat CT  -keppra 1g BID for ppx  -CT c-spine - no fractures, remove collar    CV  -SBP <160 for traumatic SAH/SDH  -trop negative    ID: lactate cleared, has elevated WBC, afebrile,, low suscpiion for infectious process, monitor clinically    Renal:  -remove morrissey  -NS at 75 while NPO  -send CPK    GI:   bedside swallow eval once collar off, if fails place NG tube  -diabetic diet    Heme:  -no evidence of coagulopahy  -holding DVT ppx, venodynes only    Contact: has daughter, continue to try to reach 89/M with DM found down with traumatic SAH.  Unknown medical history. Multifocal blood including left anterior temporal lboe and small trauamtic SDH in left parietoocciptal region. Focal aphasia concerning for stroke or other focal lesion.     Neuro:  -repeat CTH now - include CTA given focal findings  -EEG after CT  -Eventually need MRI if no focality on repeat CT  -keppra 1g BID for ppx  -CT c-spine - no fractures, remove collar    CV  -SBP <160 for traumatic SAH/SDH  -trop negative    ID: lactate cleared, has elevated WBC, afebrile,, low suscpiion for infectious process, monitor clinically    Renal:  -remove morrissey  -NS at 75 while NPO  -send CPK    GI:   bedside swallow eval once collar off, if fails place NG tube  -diabetic diet    Heme:  -no evidence of coagulopahy  -holding DVT ppx, venodynes only    Contact: has daughter, continue to try to reach  (Melani Mark - Child [phone] - left message) 89/M with DM found down with traumatic SAH.  Unknown medical history. Multifocal blood including left anterior temporal lboe and small trauamtic SDH in left parietoocciptal region.  Focal aphasia concerning for stroke or other focal lesion, or potentially subclinical focal seizures.  Sequence of events leading to initial fall/trauma is unclear.    Neuro:  -repeat CTH now - include CTA given focal findings possible vascular etiology  -EEG after CT  -Eventually need MRI if no focality on repeat CT  -keppra 1g BID for ppx  -CT c-spine - no fractures, remove collar    CV  -SBP <160 for traumatic SAH/SDH  -trop negative    ID: lactate cleared, has elevated WBC, afebrile,, low suscpiion for infectious process, monitor clinically    Renal:  -remove morrissey  -NS at 75 while NPO  -send CPK    GI:   bedside swallow eval once collar off, if fails place NG tube  -diabetic diet    Heme:  -no evidence of coagulopahy  -holding DVT ppx, venodynes only    Contact: has daughter, continue to try to reach  (Melani Mark - Child [phone] - left message)

## 2019-11-30 LAB
ANION GAP SERPL CALC-SCNC: 11 MMOL/L — SIGNIFICANT CHANGE UP (ref 5–17)
ANION GAP SERPL CALC-SCNC: 11 MMOL/L — SIGNIFICANT CHANGE UP (ref 5–17)
ANION GAP SERPL CALC-SCNC: 14 MMOL/L — SIGNIFICANT CHANGE UP (ref 5–17)
BUN SERPL-MCNC: 11 MG/DL — SIGNIFICANT CHANGE UP (ref 7–23)
BUN SERPL-MCNC: 14 MG/DL — SIGNIFICANT CHANGE UP (ref 7–23)
BUN SERPL-MCNC: 9 MG/DL — SIGNIFICANT CHANGE UP (ref 7–23)
CALCIUM SERPL-MCNC: 8.2 MG/DL — LOW (ref 8.4–10.5)
CALCIUM SERPL-MCNC: 8.4 MG/DL — SIGNIFICANT CHANGE UP (ref 8.4–10.5)
CALCIUM SERPL-MCNC: 8.4 MG/DL — SIGNIFICANT CHANGE UP (ref 8.4–10.5)
CHLORIDE SERPL-SCNC: 101 MMOL/L — SIGNIFICANT CHANGE UP (ref 96–108)
CHLORIDE SERPL-SCNC: 101 MMOL/L — SIGNIFICANT CHANGE UP (ref 96–108)
CHLORIDE SERPL-SCNC: 98 MMOL/L — SIGNIFICANT CHANGE UP (ref 96–108)
CO2 SERPL-SCNC: 22 MMOL/L — SIGNIFICANT CHANGE UP (ref 22–31)
CREAT SERPL-MCNC: 0.43 MG/DL — LOW (ref 0.5–1.3)
CREAT SERPL-MCNC: 0.48 MG/DL — LOW (ref 0.5–1.3)
CREAT SERPL-MCNC: 0.5 MG/DL — SIGNIFICANT CHANGE UP (ref 0.5–1.3)
GLUCOSE BLDC GLUCOMTR-MCNC: 186 MG/DL — HIGH (ref 70–99)
GLUCOSE BLDC GLUCOMTR-MCNC: 255 MG/DL — HIGH (ref 70–99)
GLUCOSE BLDC GLUCOMTR-MCNC: 264 MG/DL — HIGH (ref 70–99)
GLUCOSE BLDC GLUCOMTR-MCNC: 284 MG/DL — HIGH (ref 70–99)
GLUCOSE SERPL-MCNC: 263 MG/DL — HIGH (ref 70–99)
GLUCOSE SERPL-MCNC: 289 MG/DL — HIGH (ref 70–99)
GLUCOSE SERPL-MCNC: 300 MG/DL — HIGH (ref 70–99)
HCT VFR BLD CALC: 33.7 % — LOW (ref 39–50)
HGB BLD-MCNC: 10.6 G/DL — LOW (ref 13–17)
MAGNESIUM SERPL-MCNC: 2 MG/DL — SIGNIFICANT CHANGE UP (ref 1.6–2.6)
MCHC RBC-ENTMCNC: 30.5 PG — SIGNIFICANT CHANGE UP (ref 27–34)
MCHC RBC-ENTMCNC: 31.5 GM/DL — LOW (ref 32–36)
MCV RBC AUTO: 97.1 FL — SIGNIFICANT CHANGE UP (ref 80–100)
NRBC # BLD: 0 /100 WBCS — SIGNIFICANT CHANGE UP (ref 0–0)
PHOSPHATE SERPL-MCNC: 2.8 MG/DL — SIGNIFICANT CHANGE UP (ref 2.5–4.5)
PLATELET # BLD AUTO: 192 K/UL — SIGNIFICANT CHANGE UP (ref 150–400)
POTASSIUM SERPL-MCNC: 4 MMOL/L — SIGNIFICANT CHANGE UP (ref 3.5–5.3)
POTASSIUM SERPL-MCNC: 4.2 MMOL/L — SIGNIFICANT CHANGE UP (ref 3.5–5.3)
POTASSIUM SERPL-MCNC: 4.6 MMOL/L — SIGNIFICANT CHANGE UP (ref 3.5–5.3)
POTASSIUM SERPL-SCNC: 4 MMOL/L — SIGNIFICANT CHANGE UP (ref 3.5–5.3)
POTASSIUM SERPL-SCNC: 4.2 MMOL/L — SIGNIFICANT CHANGE UP (ref 3.5–5.3)
POTASSIUM SERPL-SCNC: 4.6 MMOL/L — SIGNIFICANT CHANGE UP (ref 3.5–5.3)
RBC # BLD: 3.47 M/UL — LOW (ref 4.2–5.8)
RBC # FLD: 14.7 % — HIGH (ref 10.3–14.5)
SODIUM SERPL-SCNC: 134 MMOL/L — LOW (ref 135–145)
T4 AB SER-ACNC: 4.85 UG/DL — SIGNIFICANT CHANGE UP (ref 3.17–11.72)
TSH SERPL-MCNC: 1.03 UIU/ML — SIGNIFICANT CHANGE UP (ref 0.35–4.94)
WBC # BLD: 8.46 K/UL — SIGNIFICANT CHANGE UP (ref 3.8–10.5)
WBC # FLD AUTO: 8.46 K/UL — SIGNIFICANT CHANGE UP (ref 3.8–10.5)

## 2019-11-30 PROCEDURE — 93010 ELECTROCARDIOGRAM REPORT: CPT

## 2019-11-30 PROCEDURE — 99291 CRITICAL CARE FIRST HOUR: CPT

## 2019-11-30 PROCEDURE — 99232 SBSQ HOSP IP/OBS MODERATE 35: CPT

## 2019-11-30 PROCEDURE — 95951: CPT | Mod: 26

## 2019-11-30 RX ORDER — ENOXAPARIN SODIUM 100 MG/ML
40 INJECTION SUBCUTANEOUS AT BEDTIME
Refills: 0 | Status: DISCONTINUED | OUTPATIENT
Start: 2019-11-30 | End: 2019-12-04

## 2019-11-30 RX ORDER — SODIUM CHLORIDE 9 MG/ML
500 INJECTION INTRAMUSCULAR; INTRAVENOUS; SUBCUTANEOUS ONCE
Refills: 0 | Status: COMPLETED | OUTPATIENT
Start: 2019-11-30 | End: 2019-11-30

## 2019-11-30 RX ORDER — SODIUM CHLORIDE 5 G/100ML
1000 INJECTION, SOLUTION INTRAVENOUS
Refills: 0 | Status: DISCONTINUED | OUTPATIENT
Start: 2019-11-30 | End: 2019-12-01

## 2019-11-30 RX ORDER — SENNA PLUS 8.6 MG/1
2 TABLET ORAL AT BEDTIME
Refills: 0 | Status: DISCONTINUED | OUTPATIENT
Start: 2019-11-30 | End: 2019-12-08

## 2019-11-30 RX ADMIN — LEVETIRACETAM 1000 MILLIGRAM(S): 250 TABLET, FILM COATED ORAL at 05:19

## 2019-11-30 RX ADMIN — SENNA PLUS 2 TABLET(S): 8.6 TABLET ORAL at 23:22

## 2019-11-30 RX ADMIN — ENOXAPARIN SODIUM 40 MILLIGRAM(S): 100 INJECTION SUBCUTANEOUS at 22:26

## 2019-11-30 RX ADMIN — Medication 2: at 16:08

## 2019-11-30 RX ADMIN — SODIUM CHLORIDE 1000 MILLILITER(S): 9 INJECTION INTRAMUSCULAR; INTRAVENOUS; SUBCUTANEOUS at 06:41

## 2019-11-30 RX ADMIN — Medication 500 MILLIGRAM(S): at 11:40

## 2019-11-30 RX ADMIN — Medication 6: at 06:41

## 2019-11-30 RX ADMIN — LEVETIRACETAM 1000 MILLIGRAM(S): 250 TABLET, FILM COATED ORAL at 17:07

## 2019-11-30 RX ADMIN — SODIUM CHLORIDE 1000 MILLILITER(S): 9 INJECTION INTRAMUSCULAR; INTRAVENOUS; SUBCUTANEOUS at 08:12

## 2019-11-30 RX ADMIN — SODIUM CHLORIDE 50 MILLILITER(S): 5 INJECTION, SOLUTION INTRAVENOUS at 08:27

## 2019-11-30 RX ADMIN — Medication 6: at 11:38

## 2019-11-30 RX ADMIN — Medication 6: at 22:27

## 2019-11-30 RX ADMIN — Medication 10 MILLIGRAM(S): at 12:00

## 2019-11-30 NOTE — PROGRESS NOTE ADULT - SUBJECTIVE AND OBJECTIVE BOX
HPI:  88 yo male pmhx DM  [facility] after being found down for unknown period of time by doorman who called ambulance for AMS. On admission patient noted to have scalp lacerations and wrist abrasion, CTH c/w small traumatic SAH with small SDH component. Patient is a poor historian, unable to provide ROS. Denies any pain. No family with patient. Minimal collateral EMR information. (2019 17:46)        S/Overnight events:  UVALDO o/n, remained on EEG monitoring, read pending. on Keppra, NGT inserted due to failed s&s.       Hospital Course:   HD # 0: admitted to ICU from ED. rpt imaging stable  HD #1: rpt CTH obtained for stability. Exam unchanged, remains aphasic.  HD# 2:   UVALDO o/n, remained on EEG monitoring, read pending. on Keppra, NGT inserted due to failed s&s.       Vital Signs Last 24 Hrs  T(C): 37.1 (2019 21:44), Max: 37.1 (2019 21:44)  T(F): 98.8 (2019 21:44), Max: 98.8 (2019 21:44)  HR: 92 (2019 23:00) (76 - 98)  BP: 117/51 (2019 23:00) (111/56 - 148/63)  BP(mean): 72 (2019 23:00) (70 - 104)  RR: 17 (2019 23:00) (12 - 27)  SpO2: 100% (2019 23:00) (99% - 100%)    I&O's Detail    2019 07:01  -  2019 07:00  --------------------------------------------------------  IN:    IV PiggyBack: 200 mL    sodium chloride 0.9%: 1050 mL  Total IN: 1250 mL    OUT:    Indwelling Catheter - Urethral: 1720 mL    Voided: 875 mL  Total OUT: 2595 mL    Total NET: -1345 mL      2019 07:01  -  2019 23:49  --------------------------------------------------------  IN:    Glucerna 1.5: 40 mL    IV PiggyBack: 250 mL    sodium chloride 0.9%: 1050 mL    sodium chloride 2%: 250 mL  Total IN: 1590 mL    OUT:    Indwelling Catheter - Urethral: 390 mL    Voided: 670 mL  Total OUT: 1060 mL    Total NET: 530 mL        I&O's Summary    2019 07:  -  2019 07:00  --------------------------------------------------------  IN: 1250 mL / OUT: 2595 mL / NET: -1345 mL    2019 07:  -  2019 23:49  --------------------------------------------------------  IN: 1590 mL / OUT: 1060 mL / NET: 530 mL        PHYSICAL EXAM:    awakens to voice, aphasic  opens eyes spont, PERRL, EOMI  AVILES x 4, localizes, not following commands  RRR  CTA b/l  abd soft NTND  morrissey in place  distal pulses intact        TUBES/LINES/Devices:   kates  TUSHAR    DIET:  [] NPO  [] Mechanical  [x] Tube feeds    LABS:                        11.4   11 )-----------( 209      ( 2019 05:15 )             35.3         137  |  100  |  10  ----------------------------<  139<H>  4.6   |  25  |  0.56    Ca    8.8      2019 05:15  Phos  2.8       Mg     1.9         TPro  6.1  /  Alb  3.6  /  TBili  0.4  /  DBili  x   /  AST  22  /  ALT  15  /  AlkPhos  62      PT/INR - ( 2019 16:54 )   PT: 11.4 sec;   INR: 1.01          PTT - ( 2019 16:54 )  PTT:31.1 sec  Urinalysis Basic - ( 2019 17:41 )    Color: Yellow / Appearance: Clear / S.015 / pH: x  Gluc: x / Ketone: Trace mg/dL  / Bili: Negative / Urobili: 0.2 E.U./dL   Blood: x / Protein: NEGATIVE mg/dL / Nitrite: NEGATIVE   Leuk Esterase: NEGATIVE / RBC: x / WBC x   Sq Epi: x / Non Sq Epi: x / Bacteria: x      CARDIAC MARKERS ( 2019 05:15 )  x     / x     / 89 U/L / x     / x      CARDIAC MARKERS ( 2019 16:54 )  x     / <0.01 ng/mL / 70 U/L / x     / x          CAPILLARY BLOOD GLUCOSE      POCT Blood Glucose.: 240 mg/dL (2019 21:38)  POCT Blood Glucose.: 132 mg/dL (2019 16:03)  POCT Blood Glucose.: 196 mg/dL (2019 11:51)  POCT Blood Glucose.: 139 mg/dL (2019 06:29)      Drug Levels: [] N/A    CSF Analysis: [] N/A      Allergies    No Known Allergies    Intolerances      MEDICATIONS:  Antibiotics:    Neuro:  acetaminophen   Tablet .. 650 milliGRAM(s) Oral every 6 hours PRN  levETIRAcetam 1000 milliGRAM(s) Oral two times a day    Anticoagulation:    OTHER:  dextrose 40% Gel 15 Gram(s) Oral once PRN  dextrose 50% Injectable 12.5 Gram(s) IV Push once  glucagon  Injectable 1 milliGRAM(s) IntraMuscular once PRN  hydrALAZINE Injectable 10 milliGRAM(s) IV Push every 1 hour PRN  insulin lispro (HumaLOG) corrective regimen sliding scale   SubCutaneous Before meals and at bedtime  labetalol Injectable 10 milliGRAM(s) IV Push every 1 hour PRN    IVF:  dextrose 5%. 1000 milliLiter(s) IV Continuous <Continuous>  thiamine 500 milliGRAM(s) Oral daily    CULTURES:  Culture Results:   No growth at 1 day. ( @ 19:44)  Culture Results:   No growth at 1 day. ( @ 19:44)    RADIOLOGY & ADDITIONAL TESTS:      ASSESSMENT:  89y Male s/p TBI stable on rpt imaging, concern for concussion vs seizures    SUBARACHNOID BLEED  No pertinent family history in first degree relatives  Handoff  MEWS Score  DM (diabetes mellitus)  Subarachnoid bleed  H/O hernia repair  AMS  21  Sepsis, due to unspecified organism, unspecified whether acute organ dysfunction present      Plan:     PLAN:  NEURO:  q1h vitals  tylenol prn  rpt CTH stable  needs MRI in sdu  c collar removed, no cspine injury  f/u vEEG read for subclinical seizures   cont keppra 1g BID PO    CARDIOVASCULAR:  stable  lactate cleared    PULMONARY:  RA    RENAL:  IVF dc'd   passed tOV     GI:  NPO with NGT feeds   Glucerna started     HEME:  stable    ID:  afeb  no abx  f/u blood cultures    ENDO:  ISS  diabetic  with A1C of 7       DVT PROPHYLAXIS: no chemo ppx started due to recent sdh and small sah  [x] Venodynes                                [] Heparin/Lovenox    FALL RISK:  [] Low Risk                                    [] Impulsive    DISPOSITION:   ICU status  full code  pt/ot pending  d/w Dr. Morin and Vivi    Assessment:  Present when checked    []  GCS  E   V  M     Heart Failure: []Acute, [] acute on chronic , []chronic  Heart Failure:  [] Diastolic (HFpEF), [] Systolic (HFrEF), []Combined (HFpEF and HFrEF), [] RHF, [] Pulm HTN, [] Other    [] REY, [] ATN, [] AIN, [] other  [] CKD1, [] CKD2, [] CKD 3, [] CKD 4, [] CKD 5, []ESRD    Encephalopathy: [] Metabolic, [] Hepatic, [] toxic, [] Neurological, [] Other    Abnormal Nurtitional Status: [] malnurtition (see nutrition note), [ ]underweight: BMI < 19, [] morbid obesity: BMI >40, [] Cachexia    [] Sepsis  [] hypovolemic shock,[] cardiogenic shock, [] hemorrhagic shock, [] neuogenic shock  [] Acute Respiratory Failure  []Cerebral edema, [] Brain compression/ herniation,   [] Functional quadriplegia  [] Acute blood loss anemia HPI:  88 yo male pmhx DM  [facility] after being found down for unknown period of time by doorman who called ambulance for AMS. On admission patient noted to have scalp lacerations and wrist abrasion, CTH c/w small traumatic SAH with small SDH component. Patient is a poor historian, unable to provide ROS. Denies any pain. No family with patient. Minimal collateral EMR information. (2019 17:46)    S/Overnight events:  UVALDO o/n, remained on EEG monitoring, read pending. on Keppra, NGT inserted due to failed s&s.     Hospital Course:   : HD # 0: admitted to ICU from ED for traumatic SAH/SDH. rpt imaging stable  : HD #1: rpt CTH overnight shows new small left occipital SDH, repeated again this am for stability, which shows no increased bleeding. Exam unchanged, remains aphasic and AVILES. EEG placed to r/o seizure. NGT feeds started   : HD# 2: hypotensive o/n, does not appear to be hemodynamically unstable, bedside echo performed, ruled out cardiac dysfunction / pericardial effusion / major valvular dysfunction, formal echo ordered. EEG removed after official read was negative, will remain on keppra.  MRI today.     Vital Signs Last 24 Hrs  T(C): 37.1 (2019 21:44), Max: 37.1 (2019 21:44)  T(F): 98.8 (2019 21:44), Max: 98.8 (2019 21:44)  HR: 92 (2019 23:00) (76 - 98)  BP: 117/51 (2019 23:00) (111/56 - 148/63)  BP(mean): 72 (2019 23:00) (70 - 104)  RR: 17 (2019 23:00) (12 - 27)  SpO2: 100% (2019 23:00) (99% - 100%)    I&O's Detail    2019 07:01  -  2019 07:00  --------------------------------------------------------  IN:    IV PiggyBack: 200 mL    sodium chloride 0.9%: 1050 mL  Total IN: 1250 mL    OUT:    Indwelling Catheter - Urethral: 1720 mL    Voided: 875 mL  Total OUT: 2595 mL    Total NET: -1345 mL      2019 07:  -  2019 23:49  --------------------------------------------------------  IN:    Glucerna 1.5: 40 mL    IV PiggyBack: 250 mL    sodium chloride 0.9%: 1050 mL    sodium chloride 2%: 250 mL  Total IN: 1590 mL    OUT:    Indwelling Catheter - Urethral: 390 mL    Voided: 670 mL  Total OUT: 1060 mL    Total NET: 530 mL        I&O's Summary    2019 07:  -  2019 07:00  --------------------------------------------------------  IN: 1250 mL / OUT: 2595 mL / NET: -1345 mL    2019 07:  -  2019 23:49  --------------------------------------------------------  IN: 1590 mL / OUT: 1060 mL / NET: 530 mL        PHYSICAL EXAM:    awakens to voice, aphasic  opens eyes spont, PERRL, EOMI  AVILES x 4, localizes, not following commands  RRR  CTA b/l  abd soft NTND  morrissey in place  distal pulses intact        TUBES/LINES/Devices:   pivs  VEEG - removed today     DIET:  [] NPO  [] Mechanical  [x] Tube feeds    LABS:                        11.4   1188 )-----------( 209      ( 2019 05:15 )             35.3         137  |  100  |  10  ----------------------------<  139<H>  4.6   |  25  |  0.56    Ca    8.8      2019 05:15  Phos  2.8       Mg     1.9         TPro  6.1  /  Alb  3.6  /  TBili  0.4  /  DBili  x   /  AST  22  /  ALT  15  /  AlkPhos  62      PT/INR - ( 2019 16:54 )   PT: 11.4 sec;   INR: 1.01          PTT - ( 2019 16:54 )  PTT:31.1 sec  Urinalysis Basic - ( 2019 17:41 )    Color: Yellow / Appearance: Clear / S.015 / pH: x  Gluc: x / Ketone: Trace mg/dL  / Bili: Negative / Urobili: 0.2 E.U./dL   Blood: x / Protein: NEGATIVE mg/dL / Nitrite: NEGATIVE   Leuk Esterase: NEGATIVE / RBC: x / WBC x   Sq Epi: x / Non Sq Epi: x / Bacteria: x      CARDIAC MARKERS ( 2019 05:15 )  x     / x     / 89 U/L / x     / x      CARDIAC MARKERS ( 2019 16:54 )  x     / <0.01 ng/mL / 70 U/L / x     / x          CAPILLARY BLOOD GLUCOSE      POCT Blood Glucose.: 240 mg/dL (2019 21:38)  POCT Blood Glucose.: 132 mg/dL (2019 16:03)  POCT Blood Glucose.: 196 mg/dL (2019 11:51)  POCT Blood Glucose.: 139 mg/dL (2019 06:29)      Drug Levels: [] N/A    CSF Analysis: [] N/A      Allergies    No Known Allergies    Intolerances      MEDICATIONS:  Antibiotics:    Neuro:  acetaminophen   Tablet .. 650 milliGRAM(s) Oral every 6 hours PRN  levETIRAcetam 1000 milliGRAM(s) Oral two times a day    Anticoagulation:    OTHER:  dextrose 40% Gel 15 Gram(s) Oral once PRN  dextrose 50% Injectable 12.5 Gram(s) IV Push once  glucagon  Injectable 1 milliGRAM(s) IntraMuscular once PRN  hydrALAZINE Injectable 10 milliGRAM(s) IV Push every 1 hour PRN  insulin lispro (HumaLOG) corrective regimen sliding scale   SubCutaneous Before meals and at bedtime  labetalol Injectable 10 milliGRAM(s) IV Push every 1 hour PRN    IVF:  dextrose 5%. 1000 milliLiter(s) IV Continuous <Continuous>  thiamine 500 milliGRAM(s) Oral daily    CULTURES:  Culture Results:   No growth at 1 day. ( @ 19:44)  Culture Results:   No growth at 1 day. ( @ 19:44)    RADIOLOGY & ADDITIONAL TESTS:      ASSESSMENT:  89y Male s/p traumatic SAH and SDH, exact mechanism unknown, stable on rpt imaging, concern for concussion vs seizures    SUBARACHNOID BLEED  No pertinent family history in first degree relatives  Handoff  MEWS Score  DM (diabetes mellitus)  Subarachnoid bleed  H/O hernia repair  AMS  21  Sepsis, due to unspecified organism, unspecified whether acute organ dysfunction present    PLAN:  NEURO:  q1h vitals  tylenol prn  rpt CTH stable yesterday   needs MRI - will obtain today   c collar removed yesterday, no cspine injury  EEG removed, no subclinical sz   cont keppra 1g BID PO    CARDIOVASCULAR:  Hypotensive, continue to monitor and start pressors if sBP <80   Obtain formal echo   EKG stable   lactate cleared    PULMONARY:  RA    RENAL:  Voiding via condom cath   2% at 50 started today for Na 134     GI:  NPO with NGT feeds   Glucerna started     HEME/ONC:  Left paratracheal mass with tracheal deviation   R groin mass (hard, non-tender, likely lymph node)   Needs CT chest / abd / pelvis with contrast to r/o metastatic disease     ID:  afeb  no abx  f/u blood cultures from ER: NGTD    ENDO:  ISS  diabetic with A1C of 7   Will start standing insulin today       DVT PROPHYLAXIS: no chemo ppx started due to recent sdh and small sah  [x] Venodynes                                [x] Heparin/Lovenox    FALL RISK:  [] Low Risk                                    [x] Impulsive    DISPOSITION:   ICU status  full code  pt/ot pending  d/w Dr. Morin and Vivi    Assessment:  Present when checked    []  GCS  E   V  M     Heart Failure: []Acute, [] acute on chronic , []chronic  Heart Failure:  [] Diastolic (HFpEF), [] Systolic (HFrEF), []Combined (HFpEF and HFrEF), [] RHF, [] Pulm HTN, [] Other    [] REY, [] ATN, [] AIN, [] other  [] CKD1, [] CKD2, [] CKD 3, [] CKD 4, [] CKD 5, []ESRD    Encephalopathy: [] Metabolic, [] Hepatic, [] toxic, [] Neurological, [] Other    Abnormal Nurtitional Status: [] malnurtition (see nutrition note), [ ]underweight: BMI < 19, [] morbid obesity: BMI >40, [] Cachexia    [] Sepsis  [] hypovolemic shock,[] cardiogenic shock, [] hemorrhagic shock, [] neuogenic shock  [] Acute Respiratory Failure  []Cerebral edema, [] Brain compression/ herniation,   [] Functional quadriplegia  [] Acute blood loss anemia HPI:  88 yo male pmhx DM  BIBEMS after being found down for unknown period of time by doorman who called ambulance for AMS. On admission patient noted to have scalp lacerations and wrist abrasion, CTH c/w small traumatic SAH with small SDH component. Patient is a poor historian, unable to provide ROS. Denies any pain. No family with patient. Minimal collateral EMR information. (2019 17:46)    S/Overnight events:  UVALDO o/n, remained on EEG monitoring, read pending. on Keppra, NGT inserted due to failed s&s.     Hospital Course:   : HD # 0: admitted to ICU from ED for traumatic SAH/SDH. rpt imaging stable  : HD #1: rpt CTH overnight shows new small left occipital SDH, repeated again this am for stability, which shows no increased bleeding. Exam unchanged, remains aphasic and AVILES. EEG placed to r/o seizure. NGT feeds started   : HD# 2: hypotensive o/n, does not appear to be hemodynamically unstable, bedside echo performed, ruled out cardiac dysfunction / pericardial effusion / major valvular dysfunction, formal echo ordered. EEG removed after official read was negative, will remain on keppra.  MRI today.     Vital Signs Last 24 Hrs  T(C): 37.1 (2019 21:44), Max: 37.1 (2019 21:44)  T(F): 98.8 (2019 21:44), Max: 98.8 (2019 21:44)  HR: 92 (2019 23:00) (76 - 98)  BP: 117/51 (2019 23:00) (111/56 - 148/63)  BP(mean): 72 (2019 23:00) (70 - 104)  RR: 17 (2019 23:00) (12 - 27)  SpO2: 100% (2019 23:00) (99% - 100%)    I&O's Detail    2019 07:01  -  2019 07:00  --------------------------------------------------------  IN:    IV PiggyBack: 200 mL    sodium chloride 0.9%: 1050 mL  Total IN: 1250 mL    OUT:    Indwelling Catheter - Urethral: 1720 mL    Voided: 875 mL  Total OUT: 2595 mL    Total NET: -1345 mL      2019 07:  -  2019 23:49  --------------------------------------------------------  IN:    Glucerna 1.5: 40 mL    IV PiggyBack: 250 mL    sodium chloride 0.9%: 1050 mL    sodium chloride 2%: 250 mL  Total IN: 1590 mL    OUT:    Indwelling Catheter - Urethral: 390 mL    Voided: 670 mL  Total OUT: 1060 mL    Total NET: 530 mL        I&O's Summary    2019 07:  -  2019 07:00  --------------------------------------------------------  IN: 1250 mL / OUT: 2595 mL / NET: -1345 mL    2019 07:  -  2019 23:49  --------------------------------------------------------  IN: 1590 mL / OUT: 1060 mL / NET: 530 mL        PHYSICAL EXAM:    awakens to voice, aphasic  opens eyes spont, PERRL, EOMI  AVILES x 4, localizes, not following commands  RRR  CTA b/l  abd soft NTND  morrissey in place  distal pulses intact        TUBES/LINES/Devices:   pivs  VEEG - removed today     DIET:  [] NPO  [] Mechanical  [x] Tube feeds    LABS:                        11.4   1188 )-----------( 209      ( 2019 05:15 )             35.3         137  |  100  |  10  ----------------------------<  139<H>  4.6   |  25  |  0.56    Ca    8.8      2019 05:15  Phos  2.8       Mg     1.9         TPro  6.1  /  Alb  3.6  /  TBili  0.4  /  DBili  x   /  AST  22  /  ALT  15  /  AlkPhos  62      PT/INR - ( 2019 16:54 )   PT: 11.4 sec;   INR: 1.01          PTT - ( 2019 16:54 )  PTT:31.1 sec  Urinalysis Basic - ( 2019 17:41 )    Color: Yellow / Appearance: Clear / S.015 / pH: x  Gluc: x / Ketone: Trace mg/dL  / Bili: Negative / Urobili: 0.2 E.U./dL   Blood: x / Protein: NEGATIVE mg/dL / Nitrite: NEGATIVE   Leuk Esterase: NEGATIVE / RBC: x / WBC x   Sq Epi: x / Non Sq Epi: x / Bacteria: x      CARDIAC MARKERS ( 2019 05:15 )  x     / x     / 89 U/L / x     / x      CARDIAC MARKERS ( 2019 16:54 )  x     / <0.01 ng/mL / 70 U/L / x     / x          CAPILLARY BLOOD GLUCOSE      POCT Blood Glucose.: 240 mg/dL (2019 21:38)  POCT Blood Glucose.: 132 mg/dL (2019 16:03)  POCT Blood Glucose.: 196 mg/dL (2019 11:51)  POCT Blood Glucose.: 139 mg/dL (2019 06:29)      Drug Levels: [] N/A    CSF Analysis: [] N/A      Allergies    No Known Allergies    Intolerances      MEDICATIONS:  Antibiotics:    Neuro:  acetaminophen   Tablet .. 650 milliGRAM(s) Oral every 6 hours PRN  levETIRAcetam 1000 milliGRAM(s) Oral two times a day    Anticoagulation:    OTHER:  dextrose 40% Gel 15 Gram(s) Oral once PRN  dextrose 50% Injectable 12.5 Gram(s) IV Push once  glucagon  Injectable 1 milliGRAM(s) IntraMuscular once PRN  hydrALAZINE Injectable 10 milliGRAM(s) IV Push every 1 hour PRN  insulin lispro (HumaLOG) corrective regimen sliding scale   SubCutaneous Before meals and at bedtime  labetalol Injectable 10 milliGRAM(s) IV Push every 1 hour PRN    IVF:  dextrose 5%. 1000 milliLiter(s) IV Continuous <Continuous>  thiamine 500 milliGRAM(s) Oral daily    CULTURES:  Culture Results:   No growth at 1 day. ( @ 19:44)  Culture Results:   No growth at 1 day. ( @ 19:44)    RADIOLOGY & ADDITIONAL TESTS:      ASSESSMENT:  89y Male s/p traumatic SAH and SDH, exact mechanism unknown, stable on rpt imaging, concern for concussion vs seizures    SUBARACHNOID BLEED  No pertinent family history in first degree relatives  Handoff  MEWS Score  DM (diabetes mellitus)  Subarachnoid bleed  H/O hernia repair  AMS  21  Sepsis, due to unspecified organism, unspecified whether acute organ dysfunction present    PLAN:  NEURO:  q1h vitals  tylenol prn  rpt CTH stable yesterday   needs MRI - will obtain today   c collar removed yesterday, no cspine injury  EEG removed, no subclinical sz   cont keppra 1g BID PO    CARDIOVASCULAR:  Hypotensive, continue to monitor and start pressors if sBP <80   Obtain formal echo   EKG stable   lactate cleared    PULMONARY:  RA    RENAL:  Voiding via condom cath   2% at 50 started today for Na 134     GI:  NPO with NGT feeds   Glucerna started     HEME/ONC:  Left paratracheal mass with tracheal deviation   R groin mass (hard, non-tender, likely lymph node)   Needs CT chest / abd / pelvis with contrast to r/o metastatic disease     ID:  afeb  no abx  f/u blood cultures from ER: NGTD    ENDO:  ISS  diabetic with A1C of 7   Will start standing insulin today       DVT PROPHYLAXIS: no chemo ppx started due to recent sdh and small sah  [x] Venodynes                                [x] Heparin/Lovenox    FALL RISK:  [] Low Risk                                    [x] Impulsive    DISPOSITION:   ICU status  full code  pt/ot pending  d/w Dr. Morin and Vivi    Assessment:  Present when checked    []  GCS  E   V  M     Heart Failure: []Acute, [] acute on chronic , []chronic  Heart Failure:  [] Diastolic (HFpEF), [] Systolic (HFrEF), []Combined (HFpEF and HFrEF), [] RHF, [] Pulm HTN, [] Other    [] REY, [] ATN, [] AIN, [] other  [] CKD1, [] CKD2, [] CKD 3, [] CKD 4, [] CKD 5, []ESRD    Encephalopathy: [] Metabolic, [] Hepatic, [] toxic, [] Neurological, [] Other    Abnormal Nurtitional Status: [x] malnutrition (see nutrition note), [ ]underweight: BMI < 19, [] morbid obesity: BMI >40, [] Cachexia    [] Sepsis  [] hypovolemic shock,[] cardiogenic shock, [] hemorrhagic shock, [] neuogenic shock  [] Acute Respiratory Failure  []Cerebral edema, [] Brain compression/ herniation,   [] Functional quadriplegia  [] Acute blood loss anemia

## 2019-11-30 NOTE — DIETITIAN INITIAL EVALUATION ADULT. - DIET TYPE
Glucerna 1.5@55mL/hr via NGT (1320mL TV, 1980 kcal, 108gm protein, 1001mL free water, 132% RDI vitamin/mineral)

## 2019-11-30 NOTE — DIETITIAN INITIAL EVALUATION ADULT. - OTHER INFO
89 year old M with hx DM found down with traumatic SAH. Complete medical history unknown. Multifocal blood including left anterior temporal lobe and small trauamtic SDH in left parietoocciptal region. Focal aphasia concerning for stroke or other focal lesion, or potentially subclinical focal seizures. Sequence of events leading to initial fall/trauma is unclear. Large thyroid mass and groin adenopathy suspicious for systemic process or malignancy. Pt resting in bed, unable to contribute to assessment 2/2 clinical status. SLP eval attempted 11/29, but pt too drowsy to participate. TF running at 55mL/hr at this time, no N/V noted, no BM documented, abdomen soft/nontender/nondistended. Cj score 15, skin tears to R wrist, R elbow, and back, R first toe with venous ulcer, sacrum with stage II wound, b/l wounds to buttock stage II. No apparent pain/distress at this time. Unable to obtain information regarding diet/wt hx as no family at bedside. Per physical assessment: Muscle Wasting- Temporal [   ]  Clavicle/Pectoral [ X severe  ]  Shoulder/Deltoid [ X severe  ]  Scapula [ X severe  ]  Interosseous [   ]  Quadriceps [ X severe  ]  Gastrocnemius [   ]; Fat Wasting- Orbital [   ]  Buccal [   ]  Triceps [ unable to assess at this time  ]  Rib [ X severe  ]--> Suspect severe malnutrition based on physical exam; please see malnutrition chart note. Please see below for full nutritional recommendations- d/w team. RD to monitor and f/u per protocol.

## 2019-11-30 NOTE — DIETITIAN INITIAL EVALUATION ADULT. - ADD RECOMMEND
1. Recommend continue Glucerna 1.5@55mL/hr via NGT (1320mL TV, 1980 kcal, 108gm protein, 1001mL free water, 132% RDI vitamin/mineral)- water flushes per team. Order for volume based feeding protocol & monitor for signs of intolerance. Maintain aspiration precautions.

## 2019-11-30 NOTE — DIETITIAN INITIAL EVALUATION ADULT. - ENERGY NEEDS
Ht (11/30): 177.8cm, Wt (11/30): 64kg, IBW: 75.5kg+/-10%, %IBW: 85%, BMI: 20.2   ABW used to calculate energy needs due to pt's current body weight within % IBW. Needs adjusted for age, suspected malnutrition, hypermetabolic state, wounds. Fluid needs per team.

## 2019-11-30 NOTE — CHART NOTE - NSCHARTNOTEFT_GEN_A_CORE
Upon Nutritional Assessment by the Registered Dietitian your patient was determined to meet criteria / has evidence of the following diagnosis/diagnoses:          [ ]  Mild Protein Calorie Malnutrition        [ ]  Moderate Protein Calorie Malnutrition        [X ] Severe Protein Calorie Malnutrition        [ ] Unspecified Protein Calorie Malnutrition        [ ] Underweight / BMI <19        [ ] Morbid Obesity / BMI > 40    Muscle Wasting- Temporal [   ]  Clavicle/Pectoral [ X severe  ]  Shoulder/Deltoid [ X severe  ]  Scapula [ X severe  ]  Interosseous [   ]  Quadriceps [ X severe  ]  Gastrocnemius [   ]  Fat Wasting- Orbital [   ]  Buccal [   ]  Triceps [ unable to assess at this time  ]  Rib [ X severe  ]  Suspect severe malnutrition based on physical exam    Findings as based on:  •  Comprehensive nutrition assessment and consultation    Treatment:    The following diet has been recommended:  1. Recommend continue Glucerna 1.5@55mL/hr via NGT (1320mL TV, 1980 kcal, 108gm protein, 1001mL free water, 132% RDI vitamin/mineral)- water flushes per team. Order for volume based feeding protocol & monitor for signs of intolerance. Maintain aspiration precautions.      PROVIDER Section:     By signing this assessment you are acknowledging and agree with the diagnosis/diagnoses assigned by the Registered Dietitian    Comments:

## 2019-11-30 NOTE — EEG REPORT - NS EEG TEXT BOX
[facility] Department of Neurology  Inpatient Continuous video-Electroencephalography Report    Patient Name:	LOS MCFADDEN    :	3/9/1930  MRN:	[id number]    Study Start Date/Time:  2019, 5:51:37 PM  Study End Date/Time:	    Referred by: Eduardo Morin MD    Brief Clinical History:  LOS MCFADDEN is a 89 year old Male with left subdural hematoma.     Diagnosis Code:   R56.9 convulsions/seizure  CPT: 57102 vEEG 12-24 hours      Acquisition Details:  Electroencephalography was acquired using a minimum of 21 channels on an [employer] system v 8.5.1 with electrode placement according to the standard International 10-20 system following ACNS (American Clinical Neurophysiology Society) guidelines for Long-Term Video EEG monitoring.  Anterior temporal T1 and T2 electrodes were utilized whenever possible.   The [employer] automated spike & seizure detections were all reviewed in detail, in addition to extensive portions of raw EEG.    Day 1: 2019 @ 5:51:37 PM to next morning @ 07:00 am  Background:  continuous, with predominantly alpha beta and excess delta.frequencies.  Symmetry:  Continuous ((90+%) left hemispheric delta and attenuation of faster frequencies.  Posterior Dominant Rhythm:  8.5 Hz asymmetric, poorly regulated on the left.  Organization: Rudimentary on the left, normal on the right.  Voltage:  Normal (20+ uV)  Variability: Yes. 						  Reactivity: Yes.  N2 sleep: Rudimentary but likely N2 transients present.  Spontaneous Activity:  No epileptiform discharges.  Periodic/rhythmic activity:  None.  Events:  No electrographic seizures or significant clinical events.  Provocations:  Hyperventilation and Photic stimulation: was not performed.    Daily Summary:    Mild generalized and moderate left hemispheric slowing, suggestive of a similar degree of diffuse or multifocal dysfunction along with focal left hemispheric dysfunction, in keeping with the known neurosurgical history.  No epileptiform activity and no significant clinical events occurred.  Read by:  Zeyad Wan MD

## 2019-11-30 NOTE — PROGRESS NOTE ADULT - SUBJECTIVE AND OBJECTIVE BOX
88 yo male pmhx DM  BIBEMS after being found down for unknown period of time by doorman who called ambulance for AMS. On admission patient noted to have scalp lacerations and wrist abrasion, CTH c/w small traumatic SAH with small SDH component. Patient is a poor historian, unable to provide ROS. Denies any pain. No family with patient. Minimal collateral EMR information.     : repeat CTH showing new small SDH in left posterior partietooccipital lobe.     Overnight events: mild hypotension overnight with wide pulse pressure.  agitated at times with 1mg haldol yesterday,.  give 1l bolus.  EEG prelin negative for sieuzres.     PMH: dM per chart, unable to get other hx.  Hx of dyspnea on exertion with normal echo (EF 60%) and stress test in 2018.      Exam:   Gen: edlerly man in c-collar, catchectic  CV: RRR  Pulm: CTA b/l  Exrem: Large hard right inguinal mass suspected lymphadenopathy.      MSE: awake, attends briskly, answers "yes" to many questions total wernike type aphasia with some fluent aphasia and nonsense words. Does not follow any specific commands but does mimic at times.   CN: NATALIE, EOMI no nystagmus, face symmetric, TUP midline  Motor: 5/5 b/l, no drift    Allergies: No Known Allergies          VITALS:  T(C): 37.1 (19 @ 05:12), Max: 37.7 (19 @ 00:00)  HR: 108 (19 @ 09:00) (74 - 108)  BP: 114/51 (19 @ 09:00) (87/40 - 148/63)  RR: 18 (19 @ 09:00) (10 - 27)  SpO2: 100% (19 @ 09:00) (92% - 100%)          MEDICATIONS:  acetaminophen   Tablet .. 650 milliGRAM(s) Oral every 6 hours PRN  dextrose 40% Gel 15 Gram(s) Oral once PRN  dextrose 5%. 1000 milliLiter(s) IV Continuous <Continuous>  dextrose 50% Injectable 12.5 Gram(s) IV Push once  glucagon  Injectable 1 milliGRAM(s) IntraMuscular once PRN  hydrALAZINE Injectable 10 milliGRAM(s) IV Push every 1 hour PRN  insulin lispro (HumaLOG) corrective regimen sliding scale   SubCutaneous Before meals and at bedtime  labetalol Injectable 10 milliGRAM(s) IV Push every 1 hour PRN  levETIRAcetam 1000 milliGRAM(s) Oral two times a day  sodium chloride 2% . 1000 milliLiter(s) IV Continuous <Continuous>  thiamine 500 milliGRAM(s) Oral daily      I/O's    19 @ 07:01  -  19 @ 07:00  --------------------------------------------------------  IN: 2670 mL / OUT: 1060 mL / NET: 1610 mL    19 @ 07:01  -  19 @ 09:33  --------------------------------------------------------  IN: 815 mL / OUT: 0 mL / NET: 815 mL      LABS:                        10.6   8.46  )-----------( 192      ( 2019 06:16 )             33.7         134<L>  |  98  |  9   ----------------------------<  263<H>  4.6   |  22  |  0.50    Ca    8.4      2019 06:16  Phos  2.8       Mg     2.0         TPro  6.1  /  Alb  3.6  /  TBili  0.4  /  DBili  x   /  AST  22  /  ALT  15  /  AlkPhos  62    PT/INR - ( 2019 16:54 )   PT: 11.4 sec;   INR: 1.01     PTT - ( 2019 16:54 )  PTT:31.1 sec  Urinalysis Basic - ( 2019 17:41 )    Color: Yellow / Appearance: Clear / S.015 / pH: x  Gluc: x / Ketone: Trace mg/dL  / Bili: Negative / Urobili: 0.2 E.U./dL   Blood: x / Protein: NEGATIVE mg/dL / Nitrite: NEGATIVE   Leuk Esterase: NEGATIVE / RBC: x / WBC x   Sq Epi: x / Non Sq Epi: x / Bacteria: x      CARDIAC MARKERS ( 2019 05:15 )  x     / x     / 89 U/L / x     / x      CARDIAC MARKERS ( 2019 16:54 )  x     / <0.01 ng/mL / 70 U/L / x     / x          CAPILLARY BLOOD GLUCOSE      POCT Blood Glucose.: 284 mg/dL (2019 06:20)  POCT Blood Glucose.: 240 mg/dL (2019 21:38)  POCT Blood Glucose.: 132 mg/dL (2019 16:03)  POCT Blood Glucose.: 196 mg/dL (2019 11:51)              Stroke core measures - TRAUMATIC SAH+SDH  -No indication for antiplatelet agent, echocardiogram, telemetry monitoring, telemetry monitoring, statin, lipid profile, or A1c  -Hunt-Zelaya scoring or nimodipine not applicable in traumatic hemorrhage 89 yo male pmhx DM  BIBEMS after being found down for unknown period of time by doorman who called ambulance for AMS. On admission patient noted to have scalp lacerations and wrist abrasion, CTH c/w small traumatic SAH with small SDH component. Patient is a poor historian, unable to provide ROS. Denies any pain. No family with patient. Minimal collateral EMR information.     : repeat CTH showing new small SDH in left posterior partietooccipital lobe.     Overnight events: mild hypotension overnight with wide pulse pressure.  agitated at times with 1mg haldol yesterday,.  give 1l bolus.  EEG prelin negative for sieuzres.     PMH: unable to obtain direct history.   per chart review:  DM on insulin and oral meds  distant smoker  Colon CA (cured? gets screening colonoscopies)  Hx of dyspnea on exertion with normal echo (EF 60%) and stress test in 2018.    Weight loss since 2018  Hernia repair      Exam:   Gen: edlerly man in c-collar, catchectic  CV: RRR  Pulm: CTA b/l  Exrem: Large hard right inguinal mass suspected lymphadenopathy.      MSE: awake, attends briskly, answers "yes" to many questions total wernike type aphasia with some fluent aphasia and nonsense words. Does not follow any specific commands but does mimic at times.   CN: NATALIE, EOMI no nystagmus, face symmetric, TUP midline  Motor: 5/5 b/l, no drift    Allergies: No Known Allergies          VITALS:  T(C): 37.1 (19 @ 05:12), Max: 37.7 (19 @ 00:00)  HR: 108 (19 @ 09:00) (74 - 108)  BP: 114/51 (19 @ 09:00) (87/40 - 148/63)  RR: 18 (19 @ 09:00) (10 - 27)  SpO2: 100% (19 @ 09:00) (92% - 100%)          MEDICATIONS:  acetaminophen   Tablet .. 650 milliGRAM(s) Oral every 6 hours PRN  dextrose 40% Gel 15 Gram(s) Oral once PRN  dextrose 5%. 1000 milliLiter(s) IV Continuous <Continuous>  dextrose 50% Injectable 12.5 Gram(s) IV Push once  glucagon  Injectable 1 milliGRAM(s) IntraMuscular once PRN  hydrALAZINE Injectable 10 milliGRAM(s) IV Push every 1 hour PRN  insulin lispro (HumaLOG) corrective regimen sliding scale   SubCutaneous Before meals and at bedtime  labetalol Injectable 10 milliGRAM(s) IV Push every 1 hour PRN  levETIRAcetam 1000 milliGRAM(s) Oral two times a day  sodium chloride 2% . 1000 milliLiter(s) IV Continuous <Continuous>  thiamine 500 milliGRAM(s) Oral daily      I/O's    19 @ 07:01  -  19 @ 07:00  --------------------------------------------------------  IN: 2670 mL / OUT: 1060 mL / NET: 1610 mL    19 @ 07:01  -  19 @ 09:33  --------------------------------------------------------  IN: 815 mL / OUT: 0 mL / NET: 815 mL      LABS:                        10.6   8.46  )-----------( 192      ( 2019 06:16 )             33.7     -30    134<L>  |  98  |  9   ----------------------------<  263<H>  4.6   |  22  |  0.50    Ca    8.4      2019 06:16  Phos  2.8       Mg     2.0         TPro  6.1  /  Alb  3.6  /  TBili  0.4  /  DBili  x   /  AST  22  /  ALT  15  /  AlkPhos  62    PT/INR - ( 2019 16:54 )   PT: 11.4 sec;   INR: 1.01     PTT - ( 2019 16:54 )  PTT:31.1 sec  Urinalysis Basic - ( 2019 17:41 )    Color: Yellow / Appearance: Clear / S.015 / pH: x  Gluc: x / Ketone: Trace mg/dL  / Bili: Negative / Urobili: 0.2 E.U./dL   Blood: x / Protein: NEGATIVE mg/dL / Nitrite: NEGATIVE   Leuk Esterase: NEGATIVE / RBC: x / WBC x   Sq Epi: x / Non Sq Epi: x / Bacteria: x      CARDIAC MARKERS ( 2019 05:15 )  x     / x     / 89 U/L / x     / x      CARDIAC MARKERS ( 2019 16:54 )  x     / <0.01 ng/mL / 70 U/L / x     / x          CAPILLARY BLOOD GLUCOSE      POCT Blood Glucose.: 284 mg/dL (2019 06:20)  POCT Blood Glucose.: 240 mg/dL (2019 21:38)  POCT Blood Glucose.: 132 mg/dL (2019 16:03)  POCT Blood Glucose.: 196 mg/dL (2019 11:51)              Stroke core measures - TRAUMATIC SAH+SDH  -No indication for antiplatelet agent, echocardiogram, telemetry monitoring, telemetry monitoring, statin, lipid profile, or A1c  -Hunt-Zelaya scoring or nimodipine not applicable in traumatic hemorrhage

## 2019-11-30 NOTE — PROVIDER CONTACT NOTE (OTHER) - ACTION/TREATMENT ORDERED:
Will cover 6 units of insulin and will continue to monitor
Neuro team aware and wiil continue to monitor
Will given another 500cc bolus NS and will continue to monitor

## 2019-12-01 LAB
ANION GAP SERPL CALC-SCNC: 10 MMOL/L — SIGNIFICANT CHANGE UP (ref 5–17)
ANION GAP SERPL CALC-SCNC: 8 MMOL/L — SIGNIFICANT CHANGE UP (ref 5–17)
BUN SERPL-MCNC: 15 MG/DL — SIGNIFICANT CHANGE UP (ref 7–23)
BUN SERPL-MCNC: 15 MG/DL — SIGNIFICANT CHANGE UP (ref 7–23)
CALCIUM SERPL-MCNC: 8.4 MG/DL — SIGNIFICANT CHANGE UP (ref 8.4–10.5)
CALCIUM SERPL-MCNC: 8.5 MG/DL — SIGNIFICANT CHANGE UP (ref 8.4–10.5)
CHLORIDE SERPL-SCNC: 103 MMOL/L — SIGNIFICANT CHANGE UP (ref 96–108)
CHLORIDE SERPL-SCNC: 107 MMOL/L — SIGNIFICANT CHANGE UP (ref 96–108)
CO2 SERPL-SCNC: 23 MMOL/L — SIGNIFICANT CHANGE UP (ref 22–31)
CO2 SERPL-SCNC: 26 MMOL/L — SIGNIFICANT CHANGE UP (ref 22–31)
CREAT SERPL-MCNC: 0.41 MG/DL — LOW (ref 0.5–1.3)
CREAT SERPL-MCNC: 0.41 MG/DL — LOW (ref 0.5–1.3)
GLUCOSE BLDC GLUCOMTR-MCNC: 145 MG/DL — HIGH (ref 70–99)
GLUCOSE BLDC GLUCOMTR-MCNC: 236 MG/DL — HIGH (ref 70–99)
GLUCOSE BLDC GLUCOMTR-MCNC: 255 MG/DL — HIGH (ref 70–99)
GLUCOSE BLDC GLUCOMTR-MCNC: 284 MG/DL — HIGH (ref 70–99)
GLUCOSE SERPL-MCNC: 170 MG/DL — HIGH (ref 70–99)
GLUCOSE SERPL-MCNC: 254 MG/DL — HIGH (ref 70–99)
HCT VFR BLD CALC: 32 % — LOW (ref 39–50)
HGB BLD-MCNC: 10.3 G/DL — LOW (ref 13–17)
MAGNESIUM SERPL-MCNC: 1.9 MG/DL — SIGNIFICANT CHANGE UP (ref 1.6–2.6)
MCHC RBC-ENTMCNC: 31.1 PG — SIGNIFICANT CHANGE UP (ref 27–34)
MCHC RBC-ENTMCNC: 32.2 GM/DL — SIGNIFICANT CHANGE UP (ref 32–36)
MCV RBC AUTO: 96.7 FL — SIGNIFICANT CHANGE UP (ref 80–100)
NRBC # BLD: 0 /100 WBCS — SIGNIFICANT CHANGE UP (ref 0–0)
PHOSPHATE SERPL-MCNC: 2.1 MG/DL — LOW (ref 2.5–4.5)
PLATELET # BLD AUTO: 169 K/UL — SIGNIFICANT CHANGE UP (ref 150–400)
POTASSIUM SERPL-MCNC: 3.7 MMOL/L — SIGNIFICANT CHANGE UP (ref 3.5–5.3)
POTASSIUM SERPL-MCNC: 4 MMOL/L — SIGNIFICANT CHANGE UP (ref 3.5–5.3)
POTASSIUM SERPL-SCNC: 3.7 MMOL/L — SIGNIFICANT CHANGE UP (ref 3.5–5.3)
POTASSIUM SERPL-SCNC: 4 MMOL/L — SIGNIFICANT CHANGE UP (ref 3.5–5.3)
RBC # BLD: 3.31 M/UL — LOW (ref 4.2–5.8)
RBC # FLD: 14.8 % — HIGH (ref 10.3–14.5)
SODIUM SERPL-SCNC: 136 MMOL/L — SIGNIFICANT CHANGE UP (ref 135–145)
SODIUM SERPL-SCNC: 141 MMOL/L — SIGNIFICANT CHANGE UP (ref 135–145)
WBC # BLD: 4.83 K/UL — SIGNIFICANT CHANGE UP (ref 3.8–10.5)
WBC # FLD AUTO: 4.83 K/UL — SIGNIFICANT CHANGE UP (ref 3.8–10.5)

## 2019-12-01 PROCEDURE — 99233 SBSQ HOSP IP/OBS HIGH 50: CPT

## 2019-12-01 PROCEDURE — 70553 MRI BRAIN STEM W/O & W/DYE: CPT | Mod: 26

## 2019-12-01 RX ORDER — HALOPERIDOL DECANOATE 100 MG/ML
1 INJECTION INTRAMUSCULAR ONCE
Refills: 0 | Status: COMPLETED | OUTPATIENT
Start: 2019-12-01 | End: 2019-12-01

## 2019-12-01 RX ORDER — INSULIN GLARGINE 100 [IU]/ML
8 INJECTION, SOLUTION SUBCUTANEOUS AT BEDTIME
Refills: 0 | Status: DISCONTINUED | OUTPATIENT
Start: 2019-12-01 | End: 2019-12-03

## 2019-12-01 RX ORDER — SODIUM,POTASSIUM PHOSPHATES 278-250MG
1 POWDER IN PACKET (EA) ORAL THREE TIMES A DAY
Refills: 0 | Status: COMPLETED | OUTPATIENT
Start: 2019-12-01 | End: 2019-12-01

## 2019-12-01 RX ADMIN — Medication 1 PACKET(S): at 21:31

## 2019-12-01 RX ADMIN — Medication 1 PACKET(S): at 13:14

## 2019-12-01 RX ADMIN — HALOPERIDOL DECANOATE 1 MILLIGRAM(S): 100 INJECTION INTRAMUSCULAR at 10:15

## 2019-12-01 RX ADMIN — SODIUM CHLORIDE 50 MILLILITER(S): 5 INJECTION, SOLUTION INTRAVENOUS at 05:05

## 2019-12-01 RX ADMIN — ENOXAPARIN SODIUM 40 MILLIGRAM(S): 100 INJECTION SUBCUTANEOUS at 22:00

## 2019-12-01 RX ADMIN — LEVETIRACETAM 1000 MILLIGRAM(S): 250 TABLET, FILM COATED ORAL at 17:14

## 2019-12-01 RX ADMIN — Medication 6: at 06:43

## 2019-12-01 RX ADMIN — Medication 4: at 22:00

## 2019-12-01 RX ADMIN — LEVETIRACETAM 1000 MILLIGRAM(S): 250 TABLET, FILM COATED ORAL at 05:06

## 2019-12-01 RX ADMIN — Medication 6: at 17:13

## 2019-12-01 RX ADMIN — Medication 1 PACKET(S): at 09:53

## 2019-12-01 RX ADMIN — INSULIN GLARGINE 8 UNIT(S): 100 INJECTION, SOLUTION SUBCUTANEOUS at 22:00

## 2019-12-01 RX ADMIN — Medication 500 MILLIGRAM(S): at 12:25

## 2019-12-01 NOTE — PHYSICAL THERAPY INITIAL EVALUATION ADULT - GENERAL OBSERVATIONS, REHAB EVAL
Received semi-Cool's position in bed with +tele, +pulse ox, +[place] catheter, +SCDs, +z-flow boots, +bilateral wrist restraints, family present, +IV, in no apparent distress. Patient appears to be resting comfortably in bed

## 2019-12-01 NOTE — PHYSICAL THERAPY INITIAL EVALUATION ADULT - IMPAIRED TRANSFERS: SIT/STAND, REHAB EVAL
impaired motor control/decreased flexibility/decreased strength/impaired balance/cognition/impaired postural control

## 2019-12-01 NOTE — PROGRESS NOTE ADULT - SUBJECTIVE AND OBJECTIVE BOX
88 yo male pmhx DM  BIBEMS after being found down for unknown period of time by doorman who called ambulance for AMS. On admission patient noted to have scalp lacerations and wrist abrasion, CTH c/w small traumatic SAH with small SDH component. Patient is a poor historian, unable to provide ROS. Denies any pain. No family with patient. Minimal collateral EMR information.     PMH: unable to obtain direct history.   per chart review:  DM on insulin and oral meds  distant smoker  Colon CA (cured? gets screening colonoscopies)  Hx of dyspnea on exertion with normal echo (EF 60%) and stress test in Feb 2018.    Weight loss since 2018  Hernia repair    11/29: repeat CTH showing new small SDH in left posterior partietooccipital lobe.   11/30: mild hypotension overnight with wide pulse pressure.  agitated at times with 1mg haldol yesterday,.  give 1l bolus.  EEG prelin negative for sieuzres.     Overnight events: fingersticks high overnight,. VSS, mild hypotension improved.  Didn't toelrate MRI yesterday due to agitation.      Exam:   Gen: edlerly man in c-collar, catchectic  CV: RRR  Pulm: CTA b/l  Exrem: Large hard right inguinal mass suspected lymphadenopathy.      MSE: awake, attends briskly, answers "yes" to many questions total wernike type aphasia with some fluent aphasia and nonsense words. Does not follow any specific commands but does mimic at times.   CN: NATALIE, EOMI no nystagmus, face symmetric, TUP midline  Motor: 5/5 b/l, no drift      Allergies: No Known Allergies          VITALS:  T(C): 36.3 (12-01-19 @ 05:49), Max: 36.6 (11-30-19 @ 22:12)  HR: 74 (12-01-19 @ 08:00) (66 - 112)  BP: 102/46 (12-01-19 @ 08:00) (97/47 - 131/59)  RR: 18 (12-01-19 @ 08:00) (6 - 20)  SpO2: 100% (12-01-19 @ 08:00) (98% - 100%)    EXAM:        MEDICATIONS:  acetaminophen   Tablet .. 650 milliGRAM(s) Oral every 6 hours PRN  bisacodyl 5 milliGRAM(s) Oral at bedtime  bisacodyl Suppository 10 milliGRAM(s) Rectal daily  dextrose 40% Gel 15 Gram(s) Oral once PRN  dextrose 5%. 1000 milliLiter(s) IV Continuous <Continuous>  dextrose 50% Injectable 12.5 Gram(s) IV Push once  enoxaparin Injectable 40 milliGRAM(s) SubCutaneous at bedtime  glucagon  Injectable 1 milliGRAM(s) IntraMuscular once PRN  hydrALAZINE Injectable 10 milliGRAM(s) IV Push every 1 hour PRN  insulin lispro (HumaLOG) corrective regimen sliding scale   SubCutaneous Before meals and at bedtime  labetalol Injectable 10 milliGRAM(s) IV Push every 1 hour PRN  levETIRAcetam 1000 milliGRAM(s) Oral two times a day  potassium phosphate / sodium phosphate powder 1 Packet(s) Oral three times a day  senna 2 Tablet(s) Oral at bedtime  sodium chloride 2% . 1000 milliLiter(s) IV Continuous <Continuous>  thiamine 500 milliGRAM(s) Oral daily      I/O's    11-30-19 @ 07:01  -  12-01-19 @ 07:00  --------------------------------------------------------  IN: 3275 mL / OUT: 150 mL / NET: 3125 mL    12-01-19 @ 07:01  -  12-01-19 @ 09:15  --------------------------------------------------------  IN: 105 mL / OUT: 0 mL / NET: 105 mL        LABS:                        10.3   4.83  )-----------( 169      ( 01 Dec 2019 05:49 )             32.0     12-01    136  |  103  |  15  ----------------------------<  254<H>  3.7   |  23  |  0.41<L>    Ca    8.5      01 Dec 2019 05:49  Phos  2.1     12-01  Mg     1.9     12-01              CAPILLARY BLOOD GLUCOSE      POCT Blood Glucose.: 255 mg/dL (01 Dec 2019 06:21)  POCT Blood Glucose.: 264 mg/dL (30 Nov 2019 22:21)  POCT Blood Glucose.: 186 mg/dL (30 Nov 2019 16:03)  POCT Blood Glucose.: 255 mg/dL (30 Nov 2019 11:34)                Stroke core measures - TRAUMATIC SAH+SDH  -No indication for antiplatelet agent, echocardiogram, telemetry monitoring, telemetry monitoring, statin, lipid profile, or A1c  -Hunt-Zelaya scoring or nimodipine not applicable in traumatic hemorrhage

## 2019-12-01 NOTE — PHYSICAL THERAPY INITIAL EVALUATION ADULT - PATIENT/FAMILY/SIGNIFICANT OTHER GOALS STATEMENT, PT EVAL
Patient is willing and motivated to participate in physical therapy however is unable to formally express goals at this time

## 2019-12-01 NOTE — PROGRESS NOTE ADULT - ASSESSMENT
89/M with DM found down with traumatic SAH.  Unknown medical history. Multifocal blood including left anterior temporal lboe and small trauamtic SDH in left parietoocciptal region.  Focal aphasia concerning for stroke or other focal lesion, or potentially subclinical focal seizures.  Sequence of events leading to initial fall/trauma is unclear.  PAtient is cachectic and has large thyroid mass and groin adenopathy suspicious for systemic process or malignancy.      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  2) b/l mild carotid and aortic atherosclerosis on CTA.  3) Wernike-type aphasia, focal finding concerning for stroke or other focal lesion vs. due to temporal contusion alone  -EEG negative for 12 hours  -MRI brain w/wo to definitively r/o stroke and intracranial malignancy --> sedate with haldol +/- precedex today  -keppra 1g BID for ppx - continue for 1 week  -CT c-spine - no fractures, remove collar    CV 1) hypotension with wide pulse pressure.      -For reference, outpatient echo and stress test in 2018 were WNL. Baseline documented /56  -Bedside echo without significant systolic dysfunction, valvular dysfunction nor effusion.  -SBP <160 for traumatic SAH/SDH  -trop negative  -monitor relative hypotension clinically, MAP>55 is fine, may be chronically low    ID: lactate cleared, panculture negative, no WBC, low suspicion for infectious process, monitor clinically    Renal: 1) mild hyponatremia, 2% drip   -trend BMP q12    Endocrine) 1) Large thyroid mass  2) DM at baseline, hb 7.0  -TFT's normal  -FS QID, ISS   -start lantus 8 units QHS    GI:   failed bedside swallow eval, s/p NG tube  -diabetic diet  -monitor daily labs (BMP, Mg, Phos) - monitor for refeeding syndrome in setting of cachexia    Heme/Onc: 1) concern for systemic malignancy (large thyroid mass with tracheal deviation, and inguinal adenopathy) and cachexia  -no evidence of coagulopahy  -start SQL tonight for ppx  -CT chest/abd pelvis for systemic malignancy workup    Contact: has daughter, continue to try to reach  (Melani Mark - Child [phone] - left message)    GOC: full GOC by default, attempting to establish HCP    Access:   PIV  condom cath  NG tube

## 2019-12-01 NOTE — PHYSICAL THERAPY INITIAL EVALUATION ADULT - IMPAIRMENTS FOUND, PT EVAL
aerobic capacity/endurance/cognitive impairment/gait, locomotion, and balance/posture/muscle strength/gross motor/arousal, attention, and cognition

## 2019-12-01 NOTE — PHYSICAL THERAPY INITIAL EVALUATION ADULT - MANUAL MUSCLE TESTING RESULTS, REHAB EVAL
BLE strength grossly 3/5 based on formal manual muscle testing, however patient unable to hold against resistance for accurate testing as patient has decreased complex command following and only follows simple one step commands; R  strength 4-/5; L  strength 3+/5; L elbow flexion 3/5; R elbow flexion 3/5;

## 2019-12-01 NOTE — PHYSICAL THERAPY INITIAL EVALUATION ADULT - LEVEL OF INDEPENDENCE: GAIT, REHAB EVAL
secondary to max assist x 2 to maintain static standing balance x 40 seconds with max assist for blocking at knees to prevent knee buckling - will require three person assist to accurately assess ambulation at this time/unable to perform

## 2019-12-01 NOTE — PHYSICAL THERAPY INITIAL EVALUATION ADULT - PERTINENT HX OF CURRENT PROBLEM, REHAB EVAL
89 yo male pmhx DM  BIBEMS after being found down for unknown period of time by doorman who called ambulance for AMS. On admission patient noted to have scalp lacerations and wrist abrasion, CTH c/w small traumatic SAH with small SDH component.

## 2019-12-01 NOTE — PHYSICAL THERAPY INITIAL EVALUATION ADULT - ADDITIONAL COMMENTS
Patient lives alone in an elevator apartment building with no steps to enter. Prior to admission, as per daughter at bedside, patient was independent for all functional mobility and ADLs without assistive device as patient refuses to use one. On prior fall last Thanksgiving where he lost his balance without use of an assistive device and fell onto the grass

## 2019-12-01 NOTE — PHYSICAL THERAPY INITIAL EVALUATION ADULT - IMPAIRMENTS CONTRIBUTING IMPAIRED BED MOBILITY, REHAB EVAL
abnormal muscle tone/impaired balance/cognition/decreased flexibility/decreased strength/impaired postural control

## 2019-12-01 NOTE — PHYSICAL THERAPY INITIAL EVALUATION ADULT - PLANNED THERAPY INTERVENTIONS, PT EVAL
strengthening/transfer training/postural re-education/balance training/bed mobility training/manual therapy techniques/ROM/stretching/gait training/neuromuscular re-education

## 2019-12-01 NOTE — PHYSICAL THERAPY INITIAL EVALUATION ADULT - CRITERIA FOR SKILLED THERAPEUTIC INTERVENTIONS
impairments found/risk reduction/prevention/rehab potential/functional limitations in following categories/therapy frequency/anticipated discharge recommendation

## 2019-12-02 LAB
ANION GAP SERPL CALC-SCNC: 6 MMOL/L — SIGNIFICANT CHANGE UP (ref 5–17)
ANION GAP SERPL CALC-SCNC: 9 MMOL/L — SIGNIFICANT CHANGE UP (ref 5–17)
BUN SERPL-MCNC: 18 MG/DL — SIGNIFICANT CHANGE UP (ref 7–23)
BUN SERPL-MCNC: 18 MG/DL — SIGNIFICANT CHANGE UP (ref 7–23)
CALCIUM SERPL-MCNC: 8.6 MG/DL — SIGNIFICANT CHANGE UP (ref 8.4–10.5)
CALCIUM SERPL-MCNC: 8.6 MG/DL — SIGNIFICANT CHANGE UP (ref 8.4–10.5)
CHLORIDE SERPL-SCNC: 103 MMOL/L — SIGNIFICANT CHANGE UP (ref 96–108)
CHLORIDE SERPL-SCNC: 103 MMOL/L — SIGNIFICANT CHANGE UP (ref 96–108)
CO2 SERPL-SCNC: 26 MMOL/L — SIGNIFICANT CHANGE UP (ref 22–31)
CO2 SERPL-SCNC: 29 MMOL/L — SIGNIFICANT CHANGE UP (ref 22–31)
CREAT SERPL-MCNC: 0.43 MG/DL — LOW (ref 0.5–1.3)
CREAT SERPL-MCNC: 0.43 MG/DL — LOW (ref 0.5–1.3)
GLUCOSE BLDC GLUCOMTR-MCNC: 142 MG/DL — HIGH (ref 70–99)
GLUCOSE BLDC GLUCOMTR-MCNC: 190 MG/DL — HIGH (ref 70–99)
GLUCOSE BLDC GLUCOMTR-MCNC: 275 MG/DL — HIGH (ref 70–99)
GLUCOSE BLDC GLUCOMTR-MCNC: 295 MG/DL — HIGH (ref 70–99)
GLUCOSE SERPL-MCNC: 231 MG/DL — HIGH (ref 70–99)
GLUCOSE SERPL-MCNC: 296 MG/DL — HIGH (ref 70–99)
HCT VFR BLD CALC: 31.4 % — LOW (ref 39–50)
HGB BLD-MCNC: 10.1 G/DL — LOW (ref 13–17)
MAGNESIUM SERPL-MCNC: 1.9 MG/DL — SIGNIFICANT CHANGE UP (ref 1.6–2.6)
MCHC RBC-ENTMCNC: 31 PG — SIGNIFICANT CHANGE UP (ref 27–34)
MCHC RBC-ENTMCNC: 32.2 GM/DL — SIGNIFICANT CHANGE UP (ref 32–36)
MCV RBC AUTO: 96.3 FL — SIGNIFICANT CHANGE UP (ref 80–100)
NRBC # BLD: 0 /100 WBCS — SIGNIFICANT CHANGE UP (ref 0–0)
PHOSPHATE SERPL-MCNC: 2.6 MG/DL — SIGNIFICANT CHANGE UP (ref 2.5–4.5)
PLATELET # BLD AUTO: 160 K/UL — SIGNIFICANT CHANGE UP (ref 150–400)
POTASSIUM SERPL-MCNC: 4.1 MMOL/L — SIGNIFICANT CHANGE UP (ref 3.5–5.3)
POTASSIUM SERPL-MCNC: 4.3 MMOL/L — SIGNIFICANT CHANGE UP (ref 3.5–5.3)
POTASSIUM SERPL-SCNC: 4.1 MMOL/L — SIGNIFICANT CHANGE UP (ref 3.5–5.3)
POTASSIUM SERPL-SCNC: 4.3 MMOL/L — SIGNIFICANT CHANGE UP (ref 3.5–5.3)
RBC # BLD: 3.26 M/UL — LOW (ref 4.2–5.8)
RBC # FLD: 15.1 % — HIGH (ref 10.3–14.5)
SODIUM SERPL-SCNC: 138 MMOL/L — SIGNIFICANT CHANGE UP (ref 135–145)
SODIUM SERPL-SCNC: 138 MMOL/L — SIGNIFICANT CHANGE UP (ref 135–145)
WBC # BLD: 4.49 K/UL — SIGNIFICANT CHANGE UP (ref 3.8–10.5)
WBC # FLD AUTO: 4.49 K/UL — SIGNIFICANT CHANGE UP (ref 3.8–10.5)

## 2019-12-02 PROCEDURE — 99223 1ST HOSP IP/OBS HIGH 75: CPT

## 2019-12-02 PROCEDURE — 71045 X-RAY EXAM CHEST 1 VIEW: CPT | Mod: 26

## 2019-12-02 PROCEDURE — 99233 SBSQ HOSP IP/OBS HIGH 50: CPT

## 2019-12-02 PROCEDURE — 93306 TTE W/DOPPLER COMPLETE: CPT | Mod: 26

## 2019-12-02 RX ORDER — MAGNESIUM SULFATE 500 MG/ML
1 VIAL (ML) INJECTION ONCE
Refills: 0 | Status: COMPLETED | OUTPATIENT
Start: 2019-12-02 | End: 2019-12-02

## 2019-12-02 RX ORDER — SODIUM,POTASSIUM PHOSPHATES 278-250MG
2 POWDER IN PACKET (EA) ORAL ONCE
Refills: 0 | Status: COMPLETED | OUTPATIENT
Start: 2019-12-02 | End: 2019-12-02

## 2019-12-02 RX ORDER — LEVETIRACETAM 250 MG/1
1000 TABLET, FILM COATED ORAL
Refills: 0 | Status: DISCONTINUED | OUTPATIENT
Start: 2019-12-02 | End: 2019-12-05

## 2019-12-02 RX ADMIN — Medication 2 PACKET(S): at 10:18

## 2019-12-02 RX ADMIN — Medication 2: at 11:00

## 2019-12-02 RX ADMIN — Medication 6: at 17:20

## 2019-12-02 RX ADMIN — Medication 100 GRAM(S): at 09:19

## 2019-12-02 RX ADMIN — LEVETIRACETAM 1000 MILLIGRAM(S): 250 TABLET, FILM COATED ORAL at 17:21

## 2019-12-02 RX ADMIN — Medication 6: at 06:00

## 2019-12-02 RX ADMIN — ENOXAPARIN SODIUM 40 MILLIGRAM(S): 100 INJECTION SUBCUTANEOUS at 21:23

## 2019-12-02 RX ADMIN — LEVETIRACETAM 1000 MILLIGRAM(S): 250 TABLET, FILM COATED ORAL at 05:13

## 2019-12-02 NOTE — OCCUPATIONAL THERAPY INITIAL EVALUATION ADULT - PLANNED THERAPY INTERVENTIONS, OT EVAL
strengthening/transfer training/ADL retraining/bed mobility training/cognitive, visual perceptual/balance training

## 2019-12-02 NOTE — OCCUPATIONAL THERAPY INITIAL EVALUATION ADULT - PERTINENT HX OF CURRENT PROBLEM, REHAB EVAL
88 yo male pmhx DM  BIBEMS after being found down for unknown period of time by doorman who called ambulance for AMS. On admission patient noted to have scalp lacerations and wrist abrasion, CTH c/w small traumatic SAH with small SDH component.

## 2019-12-02 NOTE — SWALLOW FEES ASSESSMENT ADULT - DIAGNOSTIC IMPRESSIONS
Pt presents w/ moderate oropharyngeal dysphagia, characterized by delayed pharyngeal swallow trigger and mistimed airway protection resulting in penetration and aspiration of thin liquids during the swallow, and reduced pharyngeal contraction resulting in residue w/ penetration and aspiration across consistencies after the swallow. Pt w/ baseline penetration and aspiration of secretions. Given pt is at high risk for aspiration, a PO diet is premature at this time. This SVC will f/u in 2-3 days to reassess swallow function and safety for PO.

## 2019-12-02 NOTE — SWALLOW BEDSIDE ASSESSMENT ADULT - PHARYNGEAL PHASE
Pt w/ wet/gurgly vocal quality and cough or throat clear following each trial of thin and nectar via tsp, suggestive of aspiration. Voice did not clear given cues to cough.

## 2019-12-02 NOTE — OCCUPATIONAL THERAPY INITIAL EVALUATION ADULT - ADDITIONAL COMMENTS
Pt a poor historian at this time, daughter at bedside reports patient lives alone in an elevator apartment. Prior to admission,  patient was independent for all functional mobility and ADLs without assistive device. Daughter reports PT gave patient a RW however pt threw it out. Also reports VNS comes 1 a week.

## 2019-12-02 NOTE — SPEECH LANGUAGE PATHOLOGY EVALUATION - COMMENTS
Expressive language is characterized by non-fluent speech, hesitations, mostly automatic words/phrases, w/ novel 1-3 word utterance production emerging. 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 Receptive language is characterized by inaccuracy for following 1-step directives and responding to yes/no questions. However, pt greatly benefitted from models to follow simple directions, and from being given written choices in field of 2 two answer questions. Reading single words is a strength and beneficial for facilitating auditory comprehension. Please provide pt w/ written choices for questions or write key words down to help convey message. Provide pt extra processing time and repetition of stimuli for comprehension; allow extra time for verbal expression. Pt will benefit from intensive speech-language therapy in the acute care setting to address language deficits and improve functional communication. This [facility] [place] will provide treatment as schedule permits. Severe auditory comprehension deficits. Pt w/ 0/5 accuracy for following 1-step directives; pt imitated 1-step directives x3/4. Pt w/ 20% accuracy for yes/no questions, often repeating "I don't understand". Pt did not answer wh- questions; however, given written choices in field of 2, pt w/ 5/5 accuracy for basic/concrete questions (i.e. "what season is it?", "how old are you?"). Pt benefitted from increased processing time and repetition of stimuli. Severe verbal expression deficits. Pt is non-fluent, w/ mostly automatic words/phrases (i.e. "I don't know" or "hi my dear") independently. For generation of novel language, pt uses mostly single-word utterances. Given written cues and models, pt produced his first and last name. Pt formed 3 word utterance x1 w/ effort, non-fluently, to convey a thought (i.e. "I haven't eaten"), benefitting from increased processing time. Pt did not attempt to produce automatic sequences (counting), despite written cues and unison speech, models. Speech is non-fluent. 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 Expressive language is characterized by non-fluent speech, hesitations, mostly automatic words/phrases, w/ novel multi-word utterance production emerging. Expression of basic wants/needs is emerging. 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 Receptive language is characterized by inaccuracy for following 1-step directives and responding to yes/no questions. However, pt greatly benefitted from models to follow simple directions, and from being given written choices in field of 2 two answer questions. Reading single words is a strength and beneficial for facilitating auditory comprehension. Please provide pt w/ written choices for questions or write key words down to help convey message. Provide pt extra processing time and repetition of stimuli for comprehension; allow extra time for verbal expression. Pt will benefit from intensive speech-language therapy in the acute care setting to address language deficits and improve functional communication. This [facility] [place] will provide treatment as schedule permits. Severe verbal expression deficits. Pt is non-fluent, w/ mostly automatic words/phrases (i.e. "I don't know" or "hi my dear") independently. For generation of novel language, pt uses mostly single-word utterances. Given written cues and models, pt produced his first and last name. Pt formed multi-word utterances x3 w/ effort, non-fluently, to convey a thought (i.e. "I haven't eaten"), benefitting from increased processing time. Pt did not attempt to produce cued automatic sequences (i.e. counting), despite written cues and unison speech, models (pt kept saying "I don't understand").

## 2019-12-02 NOTE — SPEECH LANGUAGE PATHOLOGY EVALUATION - SLP DIAGNOSIS
Severe expressive and receptive language deficits Moderate-severe expressive and severe receptive language deficits

## 2019-12-02 NOTE — OCCUPATIONAL THERAPY INITIAL EVALUATION ADULT - STANDING BALANCE: DYNAMIC, REHAB EVAL
poor balance/Pt ambulated total of 10 sidesteps at EOB with min x2 B/L HHA, cues for sequencing, narrow BHAVYA noted.

## 2019-12-02 NOTE — SWALLOW FEES ASSESSMENT ADULT - PRELIMINARY ENDOSCOPIC EXAMINATIONS
Baseline pooling of secretions/Interarytenoid/Arytenoid edema/Baseline aspiration of secretions/Baseline penetration of secretions/Interarytenoid/post-commissure edema

## 2019-12-02 NOTE — PROGRESS NOTE ADULT - SUBJECTIVE AND OBJECTIVE BOX
89 yo M, hx of DM, currently admitted w/ small traumatic SAH/SDH, contusion injury to left inferior temporal lobe w/ residual expressive/receptive aphasia s/p mechanical fall from standing. Pt found down after unknown amount of time. CTH/CTA/MRI conducted for stroke work up negative. Incidental finding of 4.2 cm left thyroid nodule noted on CTA neck. ENT consulted for further evaluation. Per daughter, pt w/ known thyroid goiter followed as outpatient w/ Endocrinologist. Thyroid goiter reportedly biopsied in 2017, however records are unavailable at this time. TFTs conducted while inpatient wnl. Per daughter, prior to admission, father did not complain of difficulty swallowing, dyspnea, changes in voice, wt loss.     PAST MEDICAL & SURGICAL HISTORY:  DM (diabetes mellitus)  H/O hernia repair    Home Medications:  insulin:  (11 Oct 2017 06:34)    Allergies    No Known Allergies    Intolerances    Social History:  unknown etoh, tobacco, or HIV/drug use (28 Nov 2019 17:46)    ICU Vital Signs Last 24 Hrs  T(C): 36.4 (02 Dec 2019 22:14), Max: 36.7 (02 Dec 2019 14:38)  T(F): 97.6 (02 Dec 2019 22:14), Max: 98 (02 Dec 2019 14:38)  HR: 96 (02 Dec 2019 20:16) (72 - 98)  BP: 156/64 (02 Dec 2019 20:16) (91/37 - 156/64)  BP(mean): 92 (02 Dec 2019 20:16) (54 - 96)  ABP: --  ABP(mean): --  RR: 18 (02 Dec 2019 20:16) (13 - 24)  SpO2: 96% (02 Dec 2019 20:16) (96% - 100%)    PE:   Gen: NAD   Neuro: Awake, alert   Head: NC/AT   Eyes: EOMI  Nose: NGT in place   OC/OP: no obvious mass/lesion   Neck: Soft, NT   Resp: Breathing comfortably on RA     FFL: nasal cavity, nasopharynx, BoT wnl. Copious endolaryngeal secretions. Vallecula clear. TVCs mobile b/l. Interarytenoid edema. Epiglottis/A-E folds sharp. Pooling of thick secretions w/in b/l piriforms/ glottic inlet. Airway widely patent.                           10.1   4.49  )-----------( 160      ( 02 Dec 2019 05:27 )             31.4   12-02    138  |  103  |  18  ----------------------------<  296<H>  4.3   |  29  |  0.43<L>    Imaging: reviewed     CT angio neck  FINDINGS:     There is a typical branching pattern of the aortic arch. There is   calcified atherosclerosis of the aortic arch and the origins of the great   vessels without stenosis.    There is contrast filling of the bilateral common carotid arteries   without significant stenosis.    There is calcified and noncalcified plaque of the left carotid   bifurcation resulting in mild (less than 50%) stenosis per NASCET   criteria. There is contrast filling of the left external carotid artery.    There is calcified and noncalcified plaque of the right carotid   bifurcation resulting in mild (less than 50%) stenosis per NASCET   criteria. There is contrast filling of the right external carotid artery.    There is mild calcified plaque the origins of the bilateral vertebral   arteries resulting in mild stenosis. There is contrast filling of the   cervical vertebral arteries. There is mild narrowing of the right   vertebral artery at the C5, C3 levels and the left vertebral artery at   the C4 level due to prominent osteophytes within the transverse foramina   at these levels.    There is a 4.3 cm heterogeneous nodule in the left lobe of the thyroid   gland.    A/P: 89 yo M, currently admitted w/ mild TBI s/p mechanical fall from standing. Finding of 4.3 cm left thyroid nodule on on CTA neck. TVCs mobile b/l, airway widely patent w/ evidence of laryngeal penetration on flexible fiberoptic laryngoscopy.     -Recommend SLP evaluation; Diet per SLP   -Please obtain previous records regarding thyroid nodule   -Patient can follow up as outpatient w/ Dr. Do Bee (ENT) following discharge   -Please page ENT w/ any additional questions/concerns

## 2019-12-02 NOTE — PROGRESS NOTE ADULT - SUBJECTIVE AND OBJECTIVE BOX
89 yo male pmhx DM  BIBEMS after being found down for unknown period of time by doorman who called ambulance for AMS. On admission patient noted to have scalp lacerations and wrist abrasion, CTH c/w small traumatic SAH with small SDH component. Patient is a poor historian, unable to provide ROS. Denies any pain. No family with patient. Minimal collateral EMR information.     PMH: unable to obtain direct history.   per chart review:  DM on insulin and oral meds  distant smoker  Colon CA (cured? gets screening colonoscopies)  Hx of dyspnea on exertion with normal echo (EF 60%) and stress test in Feb 2018.    Weight loss since 2018  Hernia repair    11/29: repeat CTH showing new small SDH in left posterior partietooccipital lobe.   11/30: mild hypotension overnight with wide pulse pressure.  agitated at times with 1mg haldol yesterday,.  give 1l bolus.  EEG prelin negative for sieuzres.   2/: fingersticks high overnight,. VSS, mild hypotension improved.  Didn't toelrate MRI yesterday due to agitation.    Overnight events: got MRI yesterday, showing subdural and traumatic       Exam:   Gen: edlerly man in c-collar, catchectic  CV: RRR  Pulm: CTA b/l  Exrem: Large hard right inguinal mass suspected lymphadenopathy.      MSE: awake, attends briskly, language much improved, oprietned to "hospital" and "2019", langauge much mroe fluent with phrases approrpiate. Follows commands briskly, intermittanly.  CN: NATALIE, EOMI no nystagmus, face symmetric, TUP midline  Motor: 5/5 b/l, no drift    Allergies: No Known Allergies          VITALS:  T(C): 36.5 (12-02-19 @ 05:26), Max: 36.6 (12-02-19 @ 00:30)  HR: 82 (12-02-19 @ 07:00) (68 - 98)  BP: 114/56 (12-02-19 @ 07:00) (91/37 - 137/69)  RR: 19 (12-02-19 @ 07:00) (10 - 24)  SpO2: 98% (12-02-19 @ 07:00) (97% - 100%)    EXAM:        MEDICATIONS:  acetaminophen   Tablet .. 650 milliGRAM(s) Oral every 6 hours PRN  bisacodyl 5 milliGRAM(s) Oral at bedtime  bisacodyl Suppository 10 milliGRAM(s) Rectal daily  dextrose 40% Gel 15 Gram(s) Oral once PRN  dextrose 5%. 1000 milliLiter(s) IV Continuous <Continuous>  dextrose 50% Injectable 12.5 Gram(s) IV Push once  enoxaparin Injectable 40 milliGRAM(s) SubCutaneous at bedtime  glucagon  Injectable 1 milliGRAM(s) IntraMuscular once PRN  hydrALAZINE Injectable 10 milliGRAM(s) IV Push every 1 hour PRN  insulin glargine Injectable (LANTUS) 8 Unit(s) SubCutaneous at bedtime  insulin lispro (HumaLOG) corrective regimen sliding scale   SubCutaneous Before meals and at bedtime  labetalol Injectable 10 milliGRAM(s) IV Push every 1 hour PRN  levETIRAcetam 1000 milliGRAM(s) Oral two times a day  magnesium sulfate  IVPB 1 Gram(s) IV Intermittent once  potassium phosphate / sodium phosphate powder 2 Packet(s) Oral once  senna 2 Tablet(s) Oral at bedtime      I/O's    12-01-19 @ 07:01  -  12-02-19 @ 07:00  --------------------------------------------------------  IN: 1920 mL / OUT: 905 mL / NET: 1015 mL    12-02-19 @ 07:01  -  12-02-19 @ 08:44  --------------------------------------------------------  IN: 55 mL / OUT: 0 mL / NET: 55 mL        Ventilator data:        LABS:                          10.1   4.49  )-----------( 160      ( 02 Dec 2019 05:27 )             31.4     12-02    138  |  103  |  18  ----------------------------<  296<H>  4.3   |  29  |  0.43<L>    Ca    8.6      02 Dec 2019 05:27  Phos  2.6     12-02  Mg     1.9     12-02              CAPILLARY BLOOD GLUCOSE      POCT Blood Glucose.: 295 mg/dL (02 Dec 2019 05:28)  POCT Blood Glucose.: 236 mg/dL (01 Dec 2019 21:43)  POCT Blood Glucose.: 284 mg/dL (01 Dec 2019 16:45)  POCT Blood Glucose.: 145 mg/dL (01 Dec 2019 12:23)            Stroke core measures - TRAUMATIC SAH+SDH  -No indication for antiplatelet agent, echocardiogram, telemetry monitoring, telemetry monitoring, statin, lipid profile, or A1c  -Hunt-Zelaya scoring or nimodipine not applicable in traumatic hemorrhage

## 2019-12-02 NOTE — SWALLOW FEES ASSESSMENT ADULT - COMMENTS
Copious thick pooled secretions in hypopharynx w/ baseline penetration and aspiration of secretions. Education provided re purpose, benefit, risk of FEES to pt and pt's daughter at bedside. Pt's daughter verbally agreed to the study. Pt tolerated the passing of the scope.

## 2019-12-02 NOTE — PROGRESS NOTE ADULT - SUBJECTIVE AND OBJECTIVE BOX
88 yo male pmhx DM  BIBEMS after being found down for unknown period of time by doorman who called ambulance for AMS. On admission patient noted to have scalp lacerations and wrist abrasion, CTH c/w small traumatic SAH with small SDH component. Patient is a poor historian, unable to provide ROS. Denies any pain. No family with patient. Minimal collateral EMR information. (28 Nov 2019 17:46)    Hospital Course:   11/28: HD # 0: admitted to ICU from ED for traumatic SAH/SDH. rpt imaging stable  11/29: HD #1: rpt CTH overnight shows new small left occipital SDH, repeated again this am for stability, which shows no increased bleeding. Exam unchanged, remains aphasic and AVILES. EEG placed to r/o seizure. NGT feeds started   11/30: HD# 2: hypotensive o/n, does not appear to be hemodynamically unstable, bedside echo performed, ruled out cardiac dysfunction / pericardial effusion / major valvular dysfunction, formal echo ordered. EEG removed after official read was negative, will remain on keppra.  MRI today.   12/01: Fails attempt to perform MRI yesterday due to agitation and hypotension. We were able  to localize family yesterday.  12/2: UVALDO o/n, neuro exam improves    Vital Signs Last 24 Hrs  T(C): 36.5 (02 Dec 2019 05:26), Max: 36.6 (02 Dec 2019 00:30)  T(F): 97.7 (02 Dec 2019 05:26), Max: 97.9 (02 Dec 2019 00:30)  HR: 82 (02 Dec 2019 06:00) (68 - 98)  BP: 112/54 (02 Dec 2019 06:00) (91/37 - 137/69)  BP(mean): 79 (02 Dec 2019 06:00) (54 - 110)  RR: 24 (02 Dec 2019 06:00) (10 - 24)  SpO2: 98% (02 Dec 2019 06:00) (97% - 100%)    I&O's Detail    01 Dec 2019 07:01  -  02 Dec 2019 07:00  --------------------------------------------------------  IN:    Enteral Tube Flush: 300 mL    Glucerna 1.5: 1320 mL    sodium chloride 2%: 300 mL  Total IN: 1920 mL    OUT:    Voided: 750 mL  Total OUT: 750 mL    Total NET: 1170 mL        I&O's Summary    01 Dec 2019 07:01  -  02 Dec 2019 07:00  --------------------------------------------------------  IN: 1920 mL / OUT: 750 mL / NET: 1170 mL        PHYSICAL EXAM:  Neurological:  awake, alert oriented to self, year and hospital  PERRL, EOMI  AVILES x 4, localizes, follows simple commands  RRR  CTA b/l  abd soft NTND  distal pulses intact    TUBES/LINES:  pivs    DIET:  [] NPO  [] Mechanical  [x] Tube feeds    LABS:                        10.1   4.49  )-----------( 160      ( 02 Dec 2019 05:27 )             31.4     12-02    138  |  103  |  18  ----------------------------<  296<H>  4.3   |  29  |  0.43<L>    Ca    8.6      02 Dec 2019 05:27  Phos  2.6     12-02  Mg     1.9     12-02              CAPILLARY BLOOD GLUCOSE      POCT Blood Glucose.: 295 mg/dL (02 Dec 2019 05:28)  POCT Blood Glucose.: 236 mg/dL (01 Dec 2019 21:43)  POCT Blood Glucose.: 284 mg/dL (01 Dec 2019 16:45)  POCT Blood Glucose.: 145 mg/dL (01 Dec 2019 12:23)      Drug Levels: [] N/A    CSF Analysis: [] N/A      Allergies    No Known Allergies    Intolerances      MEDICATIONS:  Antibiotics:    Neuro:  acetaminophen   Tablet .. 650 milliGRAM(s) Oral every 6 hours PRN  levETIRAcetam 1000 milliGRAM(s) Oral two times a day    Anticoagulation:  enoxaparin Injectable 40 milliGRAM(s) SubCutaneous at bedtime    OTHER:  bisacodyl 5 milliGRAM(s) Oral at bedtime  bisacodyl Suppository 10 milliGRAM(s) Rectal daily  dextrose 40% Gel 15 Gram(s) Oral once PRN  dextrose 50% Injectable 12.5 Gram(s) IV Push once  glucagon  Injectable 1 milliGRAM(s) IntraMuscular once PRN  hydrALAZINE Injectable 10 milliGRAM(s) IV Push every 1 hour PRN  insulin glargine Injectable (LANTUS) 8 Unit(s) SubCutaneous at bedtime  insulin lispro (HumaLOG) corrective regimen sliding scale   SubCutaneous Before meals and at bedtime  labetalol Injectable 10 milliGRAM(s) IV Push every 1 hour PRN  senna 2 Tablet(s) Oral at bedtime    IVF:  dextrose 5%. 1000 milliLiter(s) IV Continuous <Continuous>  magnesium sulfate  IVPB 1 Gram(s) IV Intermittent once  potassium phosphate / sodium phosphate powder 2 Packet(s) Oral once    CULTURES:  Culture Results:   No growth at 3 days. (11-28 @ 19:44)  Culture Results:   No growth at 3 days. (11-28 @ 19:44)    RADIOLOGY & ADDITIONAL TESTS:      ASSESSMENT:    89y Male s/p  traumatic SAH and SDH, exact mechanism unknown, stable on rpt imaging, concern for concussion vs seizures      PLAN:  q4h vitals/neuro checks  tylenol prn  MRI brain reviewed; no strokes  off EEG  cont keppra 1g BID PO    CARDIOVASCULAR:  SBP stable  RRR    PULMONARY:  RA    RENAL:  Voiding via condom cath   Na stable    GI:  NPO with NGT feeds   Glucerna   bowel regimen    HEME/ONC:  Left paratracheal mass with tracheal deviation   R groin mass (hard, non-tender, likely lymph node)   requested collateral OSH work up form family  if unable to obtain; Needs CT chest / abd / pelvis with contrast to r/o metastatic disease     ID:  afeb  no abx  f/u blood cultures from ER: NGTD    ENDO:  ISS  diabetic with A1C of 7   inc lantus    DVT PROPHYLAXIS:  [x] Venodynes                                [] Heparin/Lovenox - contraindicated due to brain hemorrhage.     FALL RISK:  [] Low Risk                                    [x] Impulsive    DISPOSITION: AR  d/w Dr. Morin and Vivi 89 yo male pmhx DM  BIBEMS after being found down for unknown period of time by doorman who called ambulance for AMS. On admission patient noted to have scalp lacerations and wrist abrasion, CTH c/w small traumatic SAH with small SDH component. Patient is a poor historian, unable to provide ROS. Denies any pain. No family with patient. Minimal collateral EMR information. (28 Nov 2019 17:46)    Hospital Course:   11/28: HD # 0: admitted to ICU from ED for traumatic SAH/SDH. rpt imaging stable  11/29: HD #1: rpt CTH overnight shows new small left occipital SDH, repeated again this am for stability, which shows no increased bleeding. Exam unchanged, remains aphasic and AVILES. EEG placed to r/o seizure. NGT feeds started   11/30: HD# 2: hypotensive o/n, does not appear to be hemodynamically unstable, bedside echo performed, ruled out cardiac dysfunction / pericardial effusion / major valvular dysfunction, formal echo ordered. EEG removed after official read was negative, will remain on keppra.  MRI today.   12/01: Fails attempt to perform MRI yesterday due to agitation and hypotension. We were able  to localize family yesterday.  12/2: UVALDO o/n, neuro exam improves    Vital Signs Last 24 Hrs  T(C): 36.5 (02 Dec 2019 05:26), Max: 36.6 (02 Dec 2019 00:30)  T(F): 97.7 (02 Dec 2019 05:26), Max: 97.9 (02 Dec 2019 00:30)  HR: 82 (02 Dec 2019 06:00) (68 - 98)  BP: 112/54 (02 Dec 2019 06:00) (91/37 - 137/69)  BP(mean): 79 (02 Dec 2019 06:00) (54 - 110)  RR: 24 (02 Dec 2019 06:00) (10 - 24)  SpO2: 98% (02 Dec 2019 06:00) (97% - 100%)    I&O's Detail    01 Dec 2019 07:01  -  02 Dec 2019 07:00  --------------------------------------------------------  IN:    Enteral Tube Flush: 300 mL    Glucerna 1.5: 1320 mL    sodium chloride 2%: 300 mL  Total IN: 1920 mL    OUT:    Voided: 750 mL  Total OUT: 750 mL    Total NET: 1170 mL        I&O's Summary    01 Dec 2019 07:01  -  02 Dec 2019 07:00  --------------------------------------------------------  IN: 1920 mL / OUT: 750 mL / NET: 1170 mL        PHYSICAL EXAM:  Neurological:  awake, alert oriented to self, year and hospital  PERRL, EOMI  AVILES x 4, localizes, follows simple commands  RRR  CTA b/l  abd soft NTND  distal pulses intact    TUBES/LINES:  pivs    DIET:  [] NPO  [] Mechanical  [x] Tube feeds    LABS:                        10.1   4.49  )-----------( 160      ( 02 Dec 2019 05:27 )             31.4     12-02    138  |  103  |  18  ----------------------------<  296<H>  4.3   |  29  |  0.43<L>    Ca    8.6      02 Dec 2019 05:27  Phos  2.6     12-02  Mg     1.9     12-02              CAPILLARY BLOOD GLUCOSE      POCT Blood Glucose.: 295 mg/dL (02 Dec 2019 05:28)  POCT Blood Glucose.: 236 mg/dL (01 Dec 2019 21:43)  POCT Blood Glucose.: 284 mg/dL (01 Dec 2019 16:45)  POCT Blood Glucose.: 145 mg/dL (01 Dec 2019 12:23)      Drug Levels: [] N/A    CSF Analysis: [] N/A      Allergies    No Known Allergies    Intolerances      MEDICATIONS:  Antibiotics:    Neuro:  acetaminophen   Tablet .. 650 milliGRAM(s) Oral every 6 hours PRN  levETIRAcetam 1000 milliGRAM(s) Oral two times a day    Anticoagulation:  enoxaparin Injectable 40 milliGRAM(s) SubCutaneous at bedtime    OTHER:  bisacodyl 5 milliGRAM(s) Oral at bedtime  bisacodyl Suppository 10 milliGRAM(s) Rectal daily  dextrose 40% Gel 15 Gram(s) Oral once PRN  dextrose 50% Injectable 12.5 Gram(s) IV Push once  glucagon  Injectable 1 milliGRAM(s) IntraMuscular once PRN  hydrALAZINE Injectable 10 milliGRAM(s) IV Push every 1 hour PRN  insulin glargine Injectable (LANTUS) 8 Unit(s) SubCutaneous at bedtime  insulin lispro (HumaLOG) corrective regimen sliding scale   SubCutaneous Before meals and at bedtime  labetalol Injectable 10 milliGRAM(s) IV Push every 1 hour PRN  senna 2 Tablet(s) Oral at bedtime    IVF:  dextrose 5%. 1000 milliLiter(s) IV Continuous <Continuous>  magnesium sulfate  IVPB 1 Gram(s) IV Intermittent once  potassium phosphate / sodium phosphate powder 2 Packet(s) Oral once    CULTURES:  Culture Results:   No growth at 3 days. (11-28 @ 19:44)  Culture Results:   No growth at 3 days. (11-28 @ 19:44)    RADIOLOGY & ADDITIONAL TESTS:      ASSESSMENT:    89y Male s/p  traumatic SAH and SDH, exact mechanism unknown, stable on rpt imaging, concern for concussion vs seizures      PLAN:  q4h vitals/neuro checks  tylenol prn  MRI brain reviewed; no strokes  off EEG  cont keppra 1g BID PO  AMS from SDH improved    CARDIOVASCULAR:  SBP stable  RRR    PULMONARY:  RA    RENAL:  Voiding via condom cath   Na stable    GI:  NPO with NGT feeds   Glucerna   bowel regimen  nutrition consulted for malnutrition, recs appreciated    HEME/ONC:  Left paratracheal mass with tracheal deviation   R groin mass (hard, non-tender, likely lymph node)   requested collateral OSH work up form family  if unable to obtain; Needs CT chest / abd / pelvis with contrast to r/o metastatic disease     ID:  afeb  no abx  f/u blood cultures from ER: NGTD    ENDO:  ISS  diabetic with A1C of 7   inc lantus    DVT PROPHYLAXIS:  [x] Venodynes                                [] Heparin/Lovenox - contraindicated due to brain hemorrhage.     FALL RISK:  [] Low Risk                                    [x] Impulsive    DISPOSITION: AR  d/w Dr. Morin and Vivi

## 2019-12-02 NOTE — OCCUPATIONAL THERAPY INITIAL EVALUATION ADULT - GENERAL OBSERVATIONS, REHAB EVAL
Pt cleared for OT by ZIGGY Villaseñor. Pt received semi-supine in bed in NAD, +tele, +NGT (feeds held), +morrissey, +scds.

## 2019-12-02 NOTE — PROGRESS NOTE ADULT - ASSESSMENT
89/M with DM found down with traumatic SAH.  Unknown medical history. Multifocal blood including left anterior temporal lboe and small trauamtic SDH in left parietoocciptal region.  Focal aphasia concerning for stroke or other focal lesion, or potentially subclinical focal seizures.  Sequence of events leading to initial fall/trauma is unclear.  PAtient is cachectic and has large thyroid mass and groin adenopathy suspicious for systemic process or malignancy.      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  2) b/l mild carotid and aortic atherosclerosis on CTA.  3) Wernike-type aphasia, focal finding concerning for stroke or other focal lesion vs. due to temporal contusion alone  -q2 neuro checks, q4h vitals  -EEG negative for 12 hours  -MRI negative for acute stroke, shows significant left temporal contusion  -keppra 1g BID for ppx - continue for 1 week  -CT c-spine - no fractures, remove collar    CV 1) hypotension with wide pulse pressure, resolved  -For reference, outpatient echo and stress test in 2018 were WNL. Baseline documented /56  -SBP <160 for traumatic SAH/SDH  -trop negative    ID: lactate cleared, panculture negative, no WBC, low suspicion for infectious process, monitor clinically    Renal: 1) mild hyponatremia, 2% drip   -trend BMP q12    Endocrine) 1) Large thyroid mass  2) DM at baseline, hb 7.0  -TFT's normal  -FS QID, ISS   -start lantus 8 units QHS --> increase today based on daily dosing needs    GI:   -repeat swallow eval today, improving  -diabetic tube feeds    Heme/Onc: 1) concern for systemic malignancy (large thyroid mass with tracheal deviation, and inguinal adenopathy) and cachexia  -SQL tonight for ppx  -CT chest/abd pelvis for systemic malignancy workup if outpatient records do not reflect large neck mass as known    Contact: has daughter, continue to try to reach  (Melani Mark - Child [phone] - left message)    Access:   PIV  condom cath  NG tube 89/M with DM found down with traumatic SAH.  Unknown medical history. Multifocal blood including left anterior temporal lboe and small trauamtic SDH in left parietoocciptal region.  Focal aphasia concerning for stroke or other focal lesion, or potentially subclinical focal seizures.  Sequence of events leading to initial fall/trauma is unclear.  PAtient is cachectic and has large thyroid mass and groin adenopathy suspicious for systemic process or malignancy.      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  2) b/l mild carotid and aortic atherosclerosis on CTA.  3) Wernike-type aphasia, focal finding concerning for stroke or other focal lesion vs. 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 However shows cortical superficial siderosis, likely independent from traumatic blood  -keppra 1g BID for ppx - continue for 1 week  -CT c-spine - no fractures, collar off    CV 1) hypotension with wide pulse pressure, resolved  -For reference, outpatient echo and stress test in 2018 were WNL. Baseline documented /56  -SBP <160 for traumatic SAH/SDH  -trop negative    ID: lactate cleared, panculture negative, no WBC, low suspicion for infectious process, monitor clinically    Renal: 1) mild hyponatremia, 2% drip   -trend BMP q12    Endocrine) 1) Large thyroid mass  2) DM at baseline, hb 7.0  -TFT's normal  -FS QID, ISS   -lantus 8 units QHS --> increase today based on daily dosing needs    GI:   -repeat swallow eval today, improving  -diabetic tube feeds  -NPO until cleared    Heme/Onc: 1) concern for systemic malignancy (large thyroid mass with tracheal deviation, and inguinal adenopathy) and cachexia  -SQL tonight for ppx  -CT chest/abd pelvis for systemic malignancy workup if outpatient records do not reflect large neck mass as known    Contact: has daughter, continue to try to reach  (Melani Mark - Child [phone] - left message)    Access:   PIV  condom cath  NG tube

## 2019-12-02 NOTE — SWALLOW BEDSIDE ASSESSMENT ADULT - SWALLOW EVAL: DIAGNOSIS
Suspect pharyngeal dysphagia given s/sx of aspiration w/ thin and nectar thick liquids. Given clinical presentation, reduced secretion management, and communication deficits, recs for FEES for objective swallow assessment.

## 2019-12-03 LAB
ANION GAP SERPL CALC-SCNC: 14 MMOL/L — SIGNIFICANT CHANGE UP (ref 5–17)
BUN SERPL-MCNC: 17 MG/DL — SIGNIFICANT CHANGE UP (ref 7–23)
CALCIUM SERPL-MCNC: 8.5 MG/DL — SIGNIFICANT CHANGE UP (ref 8.4–10.5)
CHLORIDE SERPL-SCNC: 99 MMOL/L — SIGNIFICANT CHANGE UP (ref 96–108)
CO2 SERPL-SCNC: 26 MMOL/L — SIGNIFICANT CHANGE UP (ref 22–31)
CREAT SERPL-MCNC: 0.48 MG/DL — LOW (ref 0.5–1.3)
CULTURE RESULTS: SIGNIFICANT CHANGE UP
CULTURE RESULTS: SIGNIFICANT CHANGE UP
GLUCOSE BLDC GLUCOMTR-MCNC: 286 MG/DL — HIGH (ref 70–99)
GLUCOSE BLDC GLUCOMTR-MCNC: 302 MG/DL — HIGH (ref 70–99)
GLUCOSE BLDC GLUCOMTR-MCNC: 311 MG/DL — HIGH (ref 70–99)
GLUCOSE BLDC GLUCOMTR-MCNC: 315 MG/DL — HIGH (ref 70–99)
GLUCOSE SERPL-MCNC: 319 MG/DL — HIGH (ref 70–99)
HCT VFR BLD CALC: 33.4 % — LOW (ref 39–50)
HGB BLD-MCNC: 10.7 G/DL — LOW (ref 13–17)
MAGNESIUM SERPL-MCNC: 1.8 MG/DL — SIGNIFICANT CHANGE UP (ref 1.6–2.6)
MCHC RBC-ENTMCNC: 30.9 PG — SIGNIFICANT CHANGE UP (ref 27–34)
MCHC RBC-ENTMCNC: 32 GM/DL — SIGNIFICANT CHANGE UP (ref 32–36)
MCV RBC AUTO: 96.5 FL — SIGNIFICANT CHANGE UP (ref 80–100)
NRBC # BLD: 0 /100 WBCS — SIGNIFICANT CHANGE UP (ref 0–0)
PHOSPHATE SERPL-MCNC: 2.5 MG/DL — SIGNIFICANT CHANGE UP (ref 2.5–4.5)
PLATELET # BLD AUTO: 167 K/UL — SIGNIFICANT CHANGE UP (ref 150–400)
POTASSIUM SERPL-MCNC: 4 MMOL/L — SIGNIFICANT CHANGE UP (ref 3.5–5.3)
POTASSIUM SERPL-SCNC: 4 MMOL/L — SIGNIFICANT CHANGE UP (ref 3.5–5.3)
RBC # BLD: 3.46 M/UL — LOW (ref 4.2–5.8)
RBC # FLD: 15 % — HIGH (ref 10.3–14.5)
SODIUM SERPL-SCNC: 139 MMOL/L — SIGNIFICANT CHANGE UP (ref 135–145)
SPECIMEN SOURCE: SIGNIFICANT CHANGE UP
SPECIMEN SOURCE: SIGNIFICANT CHANGE UP
WBC # BLD: 10.37 K/UL — SIGNIFICANT CHANGE UP (ref 3.8–10.5)
WBC # FLD AUTO: 10.37 K/UL — SIGNIFICANT CHANGE UP (ref 3.8–10.5)

## 2019-12-03 PROCEDURE — 71045 X-RAY EXAM CHEST 1 VIEW: CPT | Mod: 26

## 2019-12-03 PROCEDURE — 99233 SBSQ HOSP IP/OBS HIGH 50: CPT

## 2019-12-03 PROCEDURE — 99232 SBSQ HOSP IP/OBS MODERATE 35: CPT

## 2019-12-03 PROCEDURE — 93010 ELECTROCARDIOGRAM REPORT: CPT

## 2019-12-03 RX ORDER — VALACYCLOVIR 500 MG/1
500 TABLET, FILM COATED ORAL
Refills: 0 | Status: DISCONTINUED | OUTPATIENT
Start: 2019-12-03 | End: 2019-12-04

## 2019-12-03 RX ORDER — SODIUM CHLORIDE 9 MG/ML
250 INJECTION INTRAMUSCULAR; INTRAVENOUS; SUBCUTANEOUS ONCE
Refills: 0 | Status: COMPLETED | OUTPATIENT
Start: 2019-12-03 | End: 2019-12-03

## 2019-12-03 RX ORDER — MAGNESIUM OXIDE 400 MG ORAL TABLET 241.3 MG
400 TABLET ORAL ONCE
Refills: 0 | Status: COMPLETED | OUTPATIENT
Start: 2019-12-03 | End: 2019-12-03

## 2019-12-03 RX ORDER — SODIUM CHLORIDE 9 MG/ML
500 INJECTION INTRAMUSCULAR; INTRAVENOUS; SUBCUTANEOUS ONCE
Refills: 0 | Status: COMPLETED | OUTPATIENT
Start: 2019-12-03 | End: 2019-12-03

## 2019-12-03 RX ORDER — SODIUM,POTASSIUM PHOSPHATES 278-250MG
1 POWDER IN PACKET (EA) ORAL ONCE
Refills: 0 | Status: COMPLETED | OUTPATIENT
Start: 2019-12-03 | End: 2019-12-03

## 2019-12-03 RX ORDER — INSULIN GLARGINE 100 [IU]/ML
16 INJECTION, SOLUTION SUBCUTANEOUS AT BEDTIME
Refills: 0 | Status: DISCONTINUED | OUTPATIENT
Start: 2019-12-03 | End: 2019-12-11

## 2019-12-03 RX ADMIN — Medication 8: at 07:36

## 2019-12-03 RX ADMIN — SODIUM CHLORIDE 250 MILLILITER(S): 9 INJECTION INTRAMUSCULAR; INTRAVENOUS; SUBCUTANEOUS at 06:04

## 2019-12-03 RX ADMIN — SODIUM CHLORIDE 1000 MILLILITER(S): 9 INJECTION INTRAMUSCULAR; INTRAVENOUS; SUBCUTANEOUS at 22:00

## 2019-12-03 RX ADMIN — MAGNESIUM OXIDE 400 MG ORAL TABLET 400 MILLIGRAM(S): 241.3 TABLET ORAL at 12:11

## 2019-12-03 RX ADMIN — VALACYCLOVIR 500 MILLIGRAM(S): 500 TABLET, FILM COATED ORAL at 22:47

## 2019-12-03 RX ADMIN — SODIUM CHLORIDE 250 MILLILITER(S): 9 INJECTION INTRAMUSCULAR; INTRAVENOUS; SUBCUTANEOUS at 04:48

## 2019-12-03 RX ADMIN — ENOXAPARIN SODIUM 40 MILLIGRAM(S): 100 INJECTION SUBCUTANEOUS at 22:45

## 2019-12-03 RX ADMIN — Medication 1 PACKET(S): at 12:11

## 2019-12-03 RX ADMIN — Medication 6: at 17:45

## 2019-12-03 RX ADMIN — LEVETIRACETAM 1000 MILLIGRAM(S): 250 TABLET, FILM COATED ORAL at 22:46

## 2019-12-03 RX ADMIN — Medication 5 MILLIGRAM(S): at 22:46

## 2019-12-03 RX ADMIN — SENNA PLUS 2 TABLET(S): 8.6 TABLET ORAL at 22:46

## 2019-12-03 RX ADMIN — Medication 8: at 12:18

## 2019-12-03 RX ADMIN — INSULIN GLARGINE 16 UNIT(S): 100 INJECTION, SOLUTION SUBCUTANEOUS at 23:06

## 2019-12-03 RX ADMIN — Medication 8: at 23:08

## 2019-12-03 RX ADMIN — LEVETIRACETAM 1000 MILLIGRAM(S): 250 TABLET, FILM COATED ORAL at 05:12

## 2019-12-03 NOTE — ADVANCED PRACTICE NURSE CONSULT - ASSESSMENT
89 yo male pmhx DM  BIBEMS after being found down for unknown period of time by doorman who called ambulance for AMS. On admission patient noted to have scalp lacerations and wrist abrasion, CTH c/w small traumatic SAH with small SDH component. Pt presents with an unstageable pressure injury to sacrum measuring 1.5 x 1 cm and covered approx 60% with yellow, non-viable tissue. 2 stage 2 pressure injuries, 1 to each buttock, both measuring 0.5 x 0.5 cm. Periwound fungal rash noted, also seen at right groin. Portion of foreskin appears edematous, moisture damage noted on penile shaft. Scattered skin tears over left arm, skin tear to dorsum of left hand which is covered with clear dressing, abrasion to mid spine, 3.5x5 cm. RN states unable to keep condom catheter on, external rectal tube placed over penis to contain urine.

## 2019-12-03 NOTE — PROGRESS NOTE ADULT - ASSESSMENT
89/M with DM found down with traumatic SAH.   Multifocal blood including left anterior temporal lboe and small trauamtic SDH in left parietoocciptal region.  Focal aphasia concerning for stroke or other focal lesion, but MRI negative and cognitive status now improved.    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  2) b/l mild carotid and aortic atherosclerosis on CTA.  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 However shows cortical superficial siderosis, likely independent from traumatic blood  -keppra 1g BID for ppx - continue for 1 week  -CT c-spine - no fractures, collar off    CV 1) hypotension with wide pulse pressure, resolved, echo without major abnormalities  -SBP <160 for traumatic SAH/SDH  -trop negative    ID: lactate cleared, panculture negative, no WBC, low suspicion for infectious process, monitor clinically    Renal: 1) mild hyponatremia, resolved  -trend BMP daily    Endocrine) 1) Large thyroid mass  2) DM at baseline, hb 7.0  -TFT's normal  -FS QID, ISS   -lantus 8 units QHS --> increase today based on daily dosing needs    GI:   -repeat swallow eval today, improving  -diabetic tube feeds  -NPO until cleared    Heme/Onc: 1) neck mass of unclear etiology, pending results of biopsy per ENT  -SQLfor ppx    Contact: has daughter, continue to try to reach  (Melani Mark - Child [phone] - left message)    Access:   PIV  condom cath  NG tube

## 2019-12-03 NOTE — ADVANCED PRACTICE NURSE CONSULT - RECOMMEDATIONS
Recommend antifungal moisture barrier ointment to groin and bilat buttocks, do not cover wounds but keep pt turned with AirTap wedges. Spoke with ZIGGY Joshi and house staff.

## 2019-12-03 NOTE — PROGRESS NOTE ADULT - SUBJECTIVE AND OBJECTIVE BOX
88 yo male pmhx DM  BIBEMS after being found down for unknown period of time by doorman who called ambulance for AMS. On admission patient noted to have scalp lacerations and wrist abrasion, CTH c/w small traumatic SAH with small SDH component. Patient is a poor historian, unable to provide ROS. Denies any pain. No family with patient. Minimal collateral EMR information.     PMH: unable to obtain direct history.   per chart review:  DM on insulin and oral meds  distant smoker  Colon CA (cured? gets screening colonoscopies)  Hx of dyspnea on exertion with normal echo (EF 60%) and stress test in Feb 2018.    Weight loss since 2018  Hernia repair    11/29: repeat CTH showing new small SDH in left posterior partietooccipital lobe.   11/30: mild hypotension overnight with wide pulse pressure.  agitated at times with 1mg haldol yesterday,.  give 1l bolus.  EEG prelin negative for sieuzres.   2/: fingersticks high overnight,. VSS, mild hypotension improved.  Didn't toelrate MRI yesterday due to agitation.  12/2: got MRI yesterday, showing subdural and traumatic     Overnight events: pulled out NG tube, replaced.  failed swallow eval with FEES.      Exam:   Gen: edlerly man in NAD  CV: sinus tachycardia    MSE: awake, attends briskly, language much improved, fluent in phrases, orinted to [facility], knows the month and eyar and which street we are on.  Follows commands briskly.  CN: NATALIE, EOMI trace nystagmus, face symmetric, TUP midline  Motor: 5/5 b/l, no drift      Allergies: No Known Allergies          VITALS:  T(C): 36.9 (12-03-19 @ 10:00), Max: 37.2 (12-03-19 @ 05:00)  HR: 106 (12-03-19 @ 11:35) (74 - 112)  BP: 137/63 (12-03-19 @ 11:35) (118/59 - 156/64)  RR: 17 (12-03-19 @ 07:52) (17 - 18)  SpO2: 95% (12-03-19 @ 11:10) (95% - 98%)    EXAM:        MEDICATIONS:  acetaminophen   Tablet .. 650 milliGRAM(s) Oral every 6 hours PRN  bisacodyl 5 milliGRAM(s) Oral at bedtime  dextrose 40% Gel 15 Gram(s) Oral once PRN  dextrose 5%. 1000 milliLiter(s) IV Continuous <Continuous>  dextrose 50% Injectable 12.5 Gram(s) IV Push once  enoxaparin Injectable 40 milliGRAM(s) SubCutaneous at bedtime  glucagon  Injectable 1 milliGRAM(s) IntraMuscular once PRN  hydrALAZINE Injectable 10 milliGRAM(s) IV Push every 1 hour PRN  insulin glargine Injectable (LANTUS) 8 Unit(s) SubCutaneous at bedtime  insulin lispro (HumaLOG) corrective regimen sliding scale   SubCutaneous Before meals and at bedtime  labetalol Injectable 10 milliGRAM(s) IV Push every 1 hour PRN  levETIRAcetam 1000 milliGRAM(s) Oral two times a day  senna 2 Tablet(s) Oral at bedtime      I/O's    12-02-19 @ 07:01  -  12-03-19 @ 07:00  --------------------------------------------------------  IN: 1220 mL / OUT: 1077 mL / NET: 143 mL        LABS:                          10.7   10.37 )-----------( 167      ( 03 Dec 2019 06:42 )             33.4     12-03    139  |  99  |  17  ----------------------------<  319<H>  4.0   |  26  |  0.48<L>    Ca    8.5      03 Dec 2019 06:42  Phos  2.5     12-03  Mg     1.8     12-03              CAPILLARY BLOOD GLUCOSE      POCT Blood Glucose.: 315 mg/dL (03 Dec 2019 11:49)  POCT Blood Glucose.: 302 mg/dL (03 Dec 2019 06:43)  POCT Blood Glucose.: 142 mg/dL (02 Dec 2019 21:13)  POCT Blood Glucose.: 275 mg/dL (02 Dec 2019 16:36)            Stroke core measures - TRAUMATIC SAH+SDH  -No indication for antiplatelet agent, echocardiogram, telemetry monitoring, telemetry monitoring, statin, lipid profile, or A1c  -Hunt-Zelaya scoring or nimodipine not applicable in traumatic hemorrhage

## 2019-12-03 NOTE — PROGRESS NOTE ADULT - SUBJECTIVE AND OBJECTIVE BOX
HPI:  88 yo male pmhx DM  BIBEMS after being found down for unknown period of time by doorman who called ambulance for AMS. On admission patient noted to have scalp lacerations and wrist abrasion, CTH c/w small traumatic SAH with small SDH component. Patient is a poor historian, unable to provide ROS. Denies any pain. No family with patient. Minimal collateral EMR information. (28 Nov 2019 17:46)    Hospital Course:   11/28: HD # 0: admitted to ICU from ED for traumatic SAH/SDH. rpt imaging stable  11/29: HD #1: rpt CTH overnight shows new small left occipital SDH, repeated again this am for stability, which shows no increased bleeding. Exam unchanged, remains aphasic and AVILES. EEG placed to r/o seizure. NGT feeds started   11/30: HD# 2: hypotensive o/n, does not appear to be hemodynamically unstable, bedside echo performed, ruled out cardiac dysfunction / pericardial effusion / major valvular dysfunction, formal echo ordered. EEG removed after official read was negative, will remain on keppra.  MRI today.   12/01: Fails attempt to perform MRI yesterday due to agitation and hypotension. We were able  to localize family yesterday.  12/2: UVALDO o/n, neuro exam improves  12/3: UVALDO overnight, neuro stable    Vital Signs Last 24 Hrs  T(C): 36.4 (02 Dec 2019 22:14), Max: 36.7 (02 Dec 2019 14:38)  T(F): 97.6 (02 Dec 2019 22:14), Max: 98 (02 Dec 2019 14:38)  HR: 108 (03 Dec 2019 03:50) (72 - 108)  BP: 138/62 (03 Dec 2019 03:50) (106/46 - 156/64)  BP(mean): 89 (03 Dec 2019 03:50) (68 - 96)  RR: 18 (03 Dec 2019 03:50) (14 - 24)  SpO2: 96% (03 Dec 2019 03:50) (95% - 98%)    I&O's Summary    01 Dec 2019 07:01  -  02 Dec 2019 07:00  --------------------------------------------------------  IN: 1920 mL / OUT: 905 mL / NET: 1015 mL    02 Dec 2019 07:01  -  03 Dec 2019 04:30  --------------------------------------------------------  IN: 540 mL / OUT: 625 mL / NET: -85 mL        PHYSICAL EXAM:  Gen: laying in hospital bed, NAD  Neurological: AA+Ox2, OE spont, FC  Motor exam: MAEx4 with good strength, non focal   Cardiovascular: regular rate and rhythm  Respiratory: clear to auscultation    TUBES/LINES:  [] Reyna  [] Lumbar Drain  [] Wound Drains  [] Others      DIET:  [] NPO  [] Mechanical  [x] Tube feeds    LABS:                        10.1   4.49  )-----------( 160      ( 02 Dec 2019 05:27 )             31.4     12-02    138  |  103  |  18  ----------------------------<  296<H>  4.3   |  29  |  0.43<L>    Ca    8.6      02 Dec 2019 05:27  Phos  2.6     12-02  Mg     1.9     12-02              CAPILLARY BLOOD GLUCOSE      POCT Blood Glucose.: 142 mg/dL (02 Dec 2019 21:13)  POCT Blood Glucose.: 275 mg/dL (02 Dec 2019 16:36)  POCT Blood Glucose.: 190 mg/dL (02 Dec 2019 10:42)  POCT Blood Glucose.: 295 mg/dL (02 Dec 2019 05:28)      Drug Levels: [] N/A    CSF Analysis: [] N/A      Allergies    No Known Allergies    Intolerances      MEDICATIONS:  Antibiotics:    Neuro:  acetaminophen   Tablet .. 650 milliGRAM(s) Oral every 6 hours PRN  levETIRAcetam 1000 milliGRAM(s) Oral two times a day    Anticoagulation:  enoxaparin Injectable 40 milliGRAM(s) SubCutaneous at bedtime    OTHER:  bisacodyl 5 milliGRAM(s) Oral at bedtime  dextrose 40% Gel 15 Gram(s) Oral once PRN  dextrose 50% Injectable 12.5 Gram(s) IV Push once  glucagon  Injectable 1 milliGRAM(s) IntraMuscular once PRN  hydrALAZINE Injectable 10 milliGRAM(s) IV Push every 1 hour PRN  insulin glargine Injectable (LANTUS) 8 Unit(s) SubCutaneous at bedtime  insulin lispro (HumaLOG) corrective regimen sliding scale   SubCutaneous Before meals and at bedtime  labetalol Injectable 10 milliGRAM(s) IV Push every 1 hour PRN  senna 2 Tablet(s) Oral at bedtime    IVF:  dextrose 5%. 1000 milliLiter(s) IV Continuous <Continuous>  sodium chloride 0.9% Bolus 250 milliLiter(s) IV Bolus once    CULTURES:  Culture Results:   No growth at 4 days. (11-28 @ 19:44)  Culture Results:   No growth at 4 days. (11-28 @ 19:44)    RADIOLOGY & ADDITIONAL TESTS:      ASSESSMENT:  89y Male with traumatic SAH and SDH    PLAN:  - neuro checks  - vitals checks  - pain control  - cont Keppra  - normotensive SBP goal   - TF via NGT   - bowel regimen  - Left paratracheal mass with tracheal deviation, ENT consult for biopsy   - Right groin mass  - f/u OSH work up from family. If unable to obtain, needs work up (CT C/A/P to r/o mets)  - DVT PROPHYLAXIS: [x] Venodynes  [x] Heparin/Lovenox    DISPOSITION: stepdown status, full code, dispo pending    d/w Dr. Morin    Assessment:  Present when checked    []  GCS  E   V  M     Heart Failure: []Acute, [] acute on chronic , []chronic  Heart Failure:  [] Diastolic (HFpEF), [] Systolic (HFrEF), []Combined (HFpEF and HFrEF), [] RHF, [] Pulm HTN, [] Other    [] REY, [] ATN, [] AIN, [] other  [] CKD1, [] CKD2, [] CKD 3, [] CKD 4, [] CKD 5, []ESRD    Encephalopathy: [] Metabolic, [] Hepatic, [] toxic, [] Neurological, [] Other    Abnormal Nurtitional Status: [] malnurtition (see nutrition note), [ ]underweight: BMI < 19, [] morbid obesity: BMI >40, [] Cachexia    [] Sepsis  [] hypovolemic shock,[] cardiogenic shock, [] hemorrhagic shock, [] neuogenic shock  [] Acute Respiratory Failure  []Cerebral edema, [] Brain compression/ herniation,   [] Functional quadriplegia  [] Acute blood loss anemia HPI:  88 yo male pmhx DM  BIBEMS after being found down for unknown period of time by doorman who called ambulance for AMS. On admission patient noted to have scalp lacerations and wrist abrasion, CTH c/w small traumatic SAH with small SDH component. Patient is a poor historian, unable to provide ROS. Denies any pain. No family with patient. Minimal collateral EMR information. (28 Nov 2019 17:46)    Hospital Course:   11/28: HD # 0: admitted to ICU from ED for traumatic SAH/SDH. rpt imaging stable  11/29: HD #1: rpt CTH overnight shows new small left occipital SDH, repeated again this am for stability, which shows no increased bleeding. Exam unchanged, remains aphasic and AVILES. EEG placed to r/o seizure. NGT feeds started   11/30: HD# 2: hypotensive o/n, does not appear to be hemodynamically unstable, bedside echo performed, ruled out cardiac dysfunction / pericardial effusion / major valvular dysfunction, formal echo ordered. EEG removed after official read was negative, will remain on keppra.  MRI today.   12/01: Fails attempt to perform MRI yesterday due to agitation and hypotension. We were able  to localize family yesterday.  12/2: UVALDO o/n, neuro exam improves  12/3: UVALDO overnight, neuro stable. Patient has malnutrition as per nutritionist evaluation.     Vital Signs Last 24 Hrs  T(C): 36.4 (02 Dec 2019 22:14), Max: 36.7 (02 Dec 2019 14:38)  T(F): 97.6 (02 Dec 2019 22:14), Max: 98 (02 Dec 2019 14:38)  HR: 108 (03 Dec 2019 03:50) (72 - 108)  BP: 138/62 (03 Dec 2019 03:50) (106/46 - 156/64)  BP(mean): 89 (03 Dec 2019 03:50) (68 - 96)  RR: 18 (03 Dec 2019 03:50) (14 - 24)  SpO2: 96% (03 Dec 2019 03:50) (95% - 98%)    I&O's Summary    01 Dec 2019 07:01  -  02 Dec 2019 07:00  --------------------------------------------------------  IN: 1920 mL / OUT: 905 mL / NET: 1015 mL    02 Dec 2019 07:01  -  03 Dec 2019 04:30  --------------------------------------------------------  IN: 540 mL / OUT: 625 mL / NET: -85 mL        PHYSICAL EXAM:  Gen: laying in hospital bed, NAD  Neurological: AA+Ox2, OE spont, FC  Motor exam: MAEx4 with good strength, non focal   Cardiovascular: regular rate and rhythm  Respiratory: clear to auscultation    TUBES/LINES:  [] Reyna  [] Lumbar Drain  [] Wound Drains  [] Others      DIET:  [] NPO  [] Mechanical  [x] Tube feeds    LABS:                        10.1   4.49  )-----------( 160      ( 02 Dec 2019 05:27 )             31.4     12-02    138  |  103  |  18  ----------------------------<  296<H>  4.3   |  29  |  0.43<L>    Ca    8.6      02 Dec 2019 05:27  Phos  2.6     12-02  Mg     1.9     12-02              CAPILLARY BLOOD GLUCOSE      POCT Blood Glucose.: 142 mg/dL (02 Dec 2019 21:13)  POCT Blood Glucose.: 275 mg/dL (02 Dec 2019 16:36)  POCT Blood Glucose.: 190 mg/dL (02 Dec 2019 10:42)  POCT Blood Glucose.: 295 mg/dL (02 Dec 2019 05:28)      Drug Levels: [] N/A    CSF Analysis: [] N/A      Allergies    No Known Allergies    Intolerances      MEDICATIONS:  Antibiotics:    Neuro:  acetaminophen   Tablet .. 650 milliGRAM(s) Oral every 6 hours PRN  levETIRAcetam 1000 milliGRAM(s) Oral two times a day    Anticoagulation:  enoxaparin Injectable 40 milliGRAM(s) SubCutaneous at bedtime    OTHER:  bisacodyl 5 milliGRAM(s) Oral at bedtime  dextrose 40% Gel 15 Gram(s) Oral once PRN  dextrose 50% Injectable 12.5 Gram(s) IV Push once  glucagon  Injectable 1 milliGRAM(s) IntraMuscular once PRN  hydrALAZINE Injectable 10 milliGRAM(s) IV Push every 1 hour PRN  insulin glargine Injectable (LANTUS) 8 Unit(s) SubCutaneous at bedtime  insulin lispro (HumaLOG) corrective regimen sliding scale   SubCutaneous Before meals and at bedtime  labetalol Injectable 10 milliGRAM(s) IV Push every 1 hour PRN  senna 2 Tablet(s) Oral at bedtime    IVF:  dextrose 5%. 1000 milliLiter(s) IV Continuous <Continuous>  sodium chloride 0.9% Bolus 250 milliLiter(s) IV Bolus once    CULTURES:  Culture Results:   No growth at 4 days. (11-28 @ 19:44)  Culture Results:   No growth at 4 days. (11-28 @ 19:44)    RADIOLOGY & ADDITIONAL TESTS:      ASSESSMENT:  89y Male with traumatic SAH and SDH    PLAN:  - neuro checks  - vitals checks  - pain control  - cont Keppra  - normotensive SBP goal   - TF via NGT   - bowel regimen  - Left paratracheal mass with tracheal deviation, ENT consult for biopsy   - Right groin mass  - f/u OSH work up from family. If unable to obtain, needs work up (CT C/A/P to r/o mets)  - DVT PROPHYLAXIS: [x] Venodynes  [x] Heparin/Lovenox    DISPOSITION: stepdown status, full code, dispo pending    d/w Dr. Morin    Assessment:  Present when checked    []  GCS  E   V  M     Heart Failure: []Acute, [] acute on chronic , []chronic  Heart Failure:  [] Diastolic (HFpEF), [] Systolic (HFrEF), []Combined (HFpEF and HFrEF), [] RHF, [] Pulm HTN, [] Other    [] REY, [] ATN, [] AIN, [] other  [] CKD1, [] CKD2, [] CKD 3, [] CKD 4, [] CKD 5, []ESRD    Encephalopathy: [] Metabolic, [] Hepatic, [] toxic, [] Neurological, [] Other    Abnormal Nurtitional Status: [] malnurtition (see nutrition note), [ ]underweight: BMI < 19, [] morbid obesity: BMI >40, [] Cachexia    [] Sepsis  [] hypovolemic shock,[] cardiogenic shock, [] hemorrhagic shock, [] neuogenic shock  [] Acute Respiratory Failure  []Cerebral edema, [] Brain compression/ herniation,   [] Functional quadriplegia  [] Acute blood loss anemia

## 2019-12-04 DIAGNOSIS — R41.0 DISORIENTATION, UNSPECIFIED: ICD-10-CM

## 2019-12-04 LAB
ANION GAP SERPL CALC-SCNC: 10 MMOL/L — SIGNIFICANT CHANGE UP (ref 5–17)
BUN SERPL-MCNC: 23 MG/DL — SIGNIFICANT CHANGE UP (ref 7–23)
CALCIUM SERPL-MCNC: 9.1 MG/DL — SIGNIFICANT CHANGE UP (ref 8.4–10.5)
CHLORIDE SERPL-SCNC: 101 MMOL/L — SIGNIFICANT CHANGE UP (ref 96–108)
CO2 SERPL-SCNC: 27 MMOL/L — SIGNIFICANT CHANGE UP (ref 22–31)
CREAT SERPL-MCNC: 0.48 MG/DL — LOW (ref 0.5–1.3)
GLUCOSE BLDC GLUCOMTR-MCNC: 135 MG/DL — HIGH (ref 70–99)
GLUCOSE BLDC GLUCOMTR-MCNC: 183 MG/DL — HIGH (ref 70–99)
GLUCOSE BLDC GLUCOMTR-MCNC: 187 MG/DL — HIGH (ref 70–99)
GLUCOSE BLDC GLUCOMTR-MCNC: 224 MG/DL — HIGH (ref 70–99)
GLUCOSE BLDC GLUCOMTR-MCNC: 263 MG/DL — HIGH (ref 70–99)
GLUCOSE SERPL-MCNC: 195 MG/DL — HIGH (ref 70–99)
HCT VFR BLD CALC: 35.6 % — LOW (ref 39–50)
HGB BLD-MCNC: 11 G/DL — LOW (ref 13–17)
MAGNESIUM SERPL-MCNC: 2.1 MG/DL — SIGNIFICANT CHANGE UP (ref 1.6–2.6)
MCHC RBC-ENTMCNC: 30.7 PG — SIGNIFICANT CHANGE UP (ref 27–34)
MCHC RBC-ENTMCNC: 30.9 GM/DL — LOW (ref 32–36)
MCV RBC AUTO: 99.4 FL — SIGNIFICANT CHANGE UP (ref 80–100)
NRBC # BLD: 0 /100 WBCS — SIGNIFICANT CHANGE UP (ref 0–0)
PHOSPHATE SERPL-MCNC: 2.3 MG/DL — LOW (ref 2.5–4.5)
PLATELET # BLD AUTO: 164 K/UL — SIGNIFICANT CHANGE UP (ref 150–400)
POTASSIUM SERPL-MCNC: 3.8 MMOL/L — SIGNIFICANT CHANGE UP (ref 3.5–5.3)
POTASSIUM SERPL-SCNC: 3.8 MMOL/L — SIGNIFICANT CHANGE UP (ref 3.5–5.3)
RBC # BLD: 3.58 M/UL — LOW (ref 4.2–5.8)
RBC # FLD: 15 % — HIGH (ref 10.3–14.5)
SODIUM SERPL-SCNC: 138 MMOL/L — SIGNIFICANT CHANGE UP (ref 135–145)
WBC # BLD: 9.5 K/UL — SIGNIFICANT CHANGE UP (ref 3.8–10.5)
WBC # FLD AUTO: 9.5 K/UL — SIGNIFICANT CHANGE UP (ref 3.8–10.5)

## 2019-12-04 PROCEDURE — 99233 SBSQ HOSP IP/OBS HIGH 50: CPT

## 2019-12-04 PROCEDURE — 99232 SBSQ HOSP IP/OBS MODERATE 35: CPT

## 2019-12-04 PROCEDURE — 99223 1ST HOSP IP/OBS HIGH 75: CPT

## 2019-12-04 RX ORDER — SODIUM,POTASSIUM PHOSPHATES 278-250MG
1 POWDER IN PACKET (EA) ORAL ONCE
Refills: 0 | Status: COMPLETED | OUTPATIENT
Start: 2019-12-04 | End: 2019-12-04

## 2019-12-04 RX ORDER — SODIUM CHLORIDE 9 MG/ML
1000 INJECTION INTRAMUSCULAR; INTRAVENOUS; SUBCUTANEOUS
Refills: 0 | Status: DISCONTINUED | OUTPATIENT
Start: 2019-12-04 | End: 2019-12-05

## 2019-12-04 RX ORDER — VALACYCLOVIR 500 MG/1
1000 TABLET, FILM COATED ORAL EVERY 8 HOURS
Refills: 0 | Status: DISCONTINUED | OUTPATIENT
Start: 2019-12-04 | End: 2019-12-10

## 2019-12-04 RX ORDER — POTASSIUM CHLORIDE 20 MEQ
20 PACKET (EA) ORAL ONCE
Refills: 0 | Status: COMPLETED | OUTPATIENT
Start: 2019-12-04 | End: 2019-12-04

## 2019-12-04 RX ORDER — SODIUM CHLORIDE 9 MG/ML
250 INJECTION INTRAMUSCULAR; INTRAVENOUS; SUBCUTANEOUS ONCE
Refills: 0 | Status: COMPLETED | OUTPATIENT
Start: 2019-12-04 | End: 2019-12-04

## 2019-12-04 RX ADMIN — VALACYCLOVIR 1000 MILLIGRAM(S): 500 TABLET, FILM COATED ORAL at 16:30

## 2019-12-04 RX ADMIN — Medication 1 PACKET(S): at 16:30

## 2019-12-04 RX ADMIN — VALACYCLOVIR 500 MILLIGRAM(S): 500 TABLET, FILM COATED ORAL at 06:16

## 2019-12-04 RX ADMIN — SODIUM CHLORIDE 500 MILLILITER(S): 9 INJECTION INTRAMUSCULAR; INTRAVENOUS; SUBCUTANEOUS at 23:49

## 2019-12-04 RX ADMIN — VALACYCLOVIR 1000 MILLIGRAM(S): 500 TABLET, FILM COATED ORAL at 22:10

## 2019-12-04 RX ADMIN — LEVETIRACETAM 1000 MILLIGRAM(S): 250 TABLET, FILM COATED ORAL at 06:16

## 2019-12-04 RX ADMIN — LEVETIRACETAM 1000 MILLIGRAM(S): 250 TABLET, FILM COATED ORAL at 16:30

## 2019-12-04 RX ADMIN — INSULIN GLARGINE 16 UNIT(S): 100 INJECTION, SOLUTION SUBCUTANEOUS at 22:26

## 2019-12-04 RX ADMIN — SENNA PLUS 2 TABLET(S): 8.6 TABLET ORAL at 22:09

## 2019-12-04 RX ADMIN — Medication 20 MILLIEQUIVALENT(S): at 16:30

## 2019-12-04 RX ADMIN — Medication 2: at 08:56

## 2019-12-04 RX ADMIN — Medication 4: at 17:40

## 2019-12-04 RX ADMIN — Medication 6: at 22:09

## 2019-12-04 RX ADMIN — Medication 5 MILLIGRAM(S): at 22:09

## 2019-12-04 NOTE — BEHAVIORAL HEALTH ASSESSMENT NOTE - HPI (INCLUDE ILLNESS QUALITY, SEVERITY, DURATION, TIMING, CONTEXT, MODIFYING FACTORS, ASSOCIATED SIGNS AND SYMPTOMS)
89yo  M w/ no PPH, PMH Diabetes, s/p unwitnessed fall with TBI, Traumatic SAH stable, SDH. Psychiatry consulted for delirium and visual hallucination 89yo  M w/ no PPH, PMH Diabetes, s/p unwitnessed fall with TBI, Traumatic SAH stable, SDH. Psychiatry consulted for delirium and visual hallucination. Pt seen on bedside with family members nephsarthak Lazaro Eid present. Patient is alert and sitting on bed while watching TV and has an NGT placed. Pt is greeted by writer and could tell his name, age, and date of birth, but he is not oriented to time and place (thinks he is in [facility]). Pt is cooperative and pleasant during interview and did not seem internally preoccupied. 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 He however struggles with his immediate memory and sometimes have problems finding his belongings. He is otherwise independent in his ADLs and has been living alone in an apartment and had only recently started being seen by a HHA 1x a week, who found patient after the fall. He himself could not recall the circumstances which led to the fall, although he had a fall episode on 11/2018 where he had a fractured R shoulder while visiting family in [place]. Pt denies paranoid/persecutory delusion, reported euthymic mood, and denied AVH, although endorsed somewhat confusion and weakness, as well as endorsed seeing his sister who 'should not be there' although denied it currently to this writer. Pt was unable to name his family members but could recall them and said that 'they take good care of me, I love them'. Pt denies current/past suicidal/homicidal ideation/intent/plan. He denies any pain, SOB, or discomforts.    Writer spoke to nephsarthak Lazaro Eid [phone] who reported pt's high baseline functioning and independence, he denied any chronic memory problems aside from occasional forgetfulness although he has recently started requiring more needs and thus HHA is started a couple of weeks ago by daughter Melani Regalado (). Nephew denied any PPH/FH of mental health diagnosis, h/o agression, suicide, or past treatment. Nephew agreed that his lucidity changes from time to time and he is currently coming out from a period of confusion and he is getting better. However, patient also has been trying to take out his NG Tube and demanding to use the toilet although he is using condom catheter at the moment. As per PT note, pt is quite frail and could barely walk assisted with 4 aides today.

## 2019-12-04 NOTE — BEHAVIORAL HEALTH ASSESSMENT NOTE - NSBHCHARTREVIEWINVESTIGATE_PSY_A_CORE FT
< from: 12 Lead ECG (11.28.19 @ 18:53) >    Ventricular Rate 88 BPM    Atrial Rate 88 BPM    QRS Duration 58 ms    Q-T Interval 371 ms    QTC Calculation(Bezet) 449 ms    R Axis 84 degrees    T Axis 88 degrees    Diagnosis Line Atrial fibrillation with premature ventricular or aberrantly conducted complexes  Abnormal ECG    Confirmed by JAYNE GRANADOS MD ([id number]) on 11/29/2019 1:14:38 PM

## 2019-12-04 NOTE — SWALLOW BEDSIDE ASSESSMENT ADULT - ASR SWALLOW ASPIRATION MONITOR
fever/pneumonia/upper respiratory infection/cough/gurgly voice/change of breathing pattern/oral hygiene/position upright (89Y)/throat clearing

## 2019-12-04 NOTE — BEHAVIORAL HEALTH ASSESSMENT NOTE - NSBHCHARTREVIEWLAB_PSY_A_CORE FT
11.0   9.50  )-----------( 164      ( 04 Dec 2019 07:03 )             35.6   12-04    138  |  101  |  23  ----------------------------<  195<H>  3.8   |  27  |  0.48<L>    Ca    9.1      04 Dec 2019 07:03  Phos  2.3     12-04  Mg     2.1     12-04

## 2019-12-04 NOTE — BEHAVIORAL HEALTH ASSESSMENT NOTE - SUMMARY
89yo  M w/ no PPH, PMH Diabetes, s/p unwitnessed fall with TBI, Traumatic SAH stable, SDH. Psychiatry consulted for delirium and visual hallucination 88yo  M w/ no PPH, PMH Diabetes, s/p unwitnessed fall with TBI, Traumatic SAH stable, SDH. Psychiatry consulted for delirium and visual hallucination. Patient's current presentation is likely delirium secondary to head trauma, although other insidious causes of delirium should be ruled out. Patient has high baseline functioning, biologically there is no psychiatric/substance history which could contribute to his current symptomatology. Psychologically, he is independent, confident, and cooperative and pleasant, was not combative during this interview. He denies depression/manic symptoms and denies paranoid ideation. He has visual hallucination of seeing his sister but denies it during current assessment. Socially, patient has supportive family members and stable housing but may require more assistance as he lives by himself and only recently had a HHA who comes once every week.

## 2019-12-04 NOTE — SWALLOW BEDSIDE ASSESSMENT ADULT - SWALLOW EVAL: DIAGNOSIS
Suspect pharyngeal dysphagia given results of FEES (12/2) and clinical presentation w/ s/sx of aspiration. Due to AMS, poor secretion management, and presentation consistent w/ results of FEES, recs to continue NPO w/ NGT. This SVC will continue to f/u to determine appropriateness for instrumental re-assessment.

## 2019-12-04 NOTE — BEHAVIORAL HEALTH ASSESSMENT NOTE - RISK ASSESSMENT
Low Acute Suicide Risk Chronic risk factor: old age,  history, male sex, living by self  modifiable risk factor: delirium, SAH, poor oral intake, needs assistance  protective factor: no suicide/MH history, supportive family members, stable housing, engaged in treatment, high baseline functioning    Current risk for self harm is low.

## 2019-12-04 NOTE — BEHAVIORAL HEALTH ASSESSMENT NOTE - NSBHADMITCOUNSELOTHER_PSY_A_CORE FT
Provided pscyhoeducation on delirium, psych meds for agitation, and how they might affect his ability to do physical therapy.

## 2019-12-04 NOTE — BEHAVIORAL HEALTH ASSESSMENT NOTE - PROBLEM SELECTOR PLAN 1
May start quetiapine 25mg at bedtime to treat psychotic symptoms  If patient becomes agitated, may administer haloperidol 0.5mg IM if not amenable to redirection  Behavioral management: reorient patient daily    Rule out any other reversible causes of delirium: suggest to order Urinalysis given urinary/fecal incontinence, monitor electrolytes abnormalities, and reassess TBI sequelae. May start quetiapine 12.5mg at bedtime to treat psychotic symptoms  If patient becomes agitated, may administer haloperidol 0.5mg IM if not amenable to redirection  Behavioral management: reorient patient daily    Rule out any other reversible causes of delirium: suggest to order Urinalysis given urinary/fecal incontinence, monitor electrolytes abnormalities, and reassess TBI sequelae. May start quetiapine 12.5mg at bedtime to treat psychotic symptoms  If patient becomes agitated, may administer 12.5 mg seroquel q6h prn agitation or haloperidol 0.5mg IM/IV if refusing PO  Behavioral management: reorient patient daily    Rule out any other reversible causes of delirium: suggest to order Urinalysis given urinary/fecal incontinence, monitor electrolytes abnormalities, and reassess TBI sequelae.

## 2019-12-04 NOTE — BEHAVIORAL HEALTH ASSESSMENT NOTE - NSBHADMITCOUNSEL_PSY_A_CORE
instructions for management, treatment and follow up/risk factor reduction/other.../client/family/caregiver education/diagnostic results/impressions and/or recommended studies/risks and benefits of treatment options/importance of adherence to chosen treatment/prognosis

## 2019-12-04 NOTE — PROGRESS NOTE ADULT - SUBJECTIVE AND OBJECTIVE BOX
89 yo male pmhx DM  BIBEMS after being found down for unknown period of time by doorman who called ambulance for AMS. On admission patient noted to have scalp lacerations and wrist abrasion, CTH c/w small traumatic SAH with small SDH component. Patient is a poor historian, unable to provide ROS. Denies any pain. No family with patient. Minimal collateral EMR information.     PMH: unable to obtain direct history.   per chart review:  DM on insulin and oral meds  distant smoker  Colon CA (cured? gets screening colonoscopies)  Hx of dyspnea on exertion with normal echo (EF 60%) and stress test in Feb 2018.    Weight loss since 2018  Hernia repair    11/29: repeat CTH showing new small SDH in left posterior partietooccipital lobe.   11/30: mild hypotension overnight with wide pulse pressure.  agitated at times with 1mg haldol yesterday,.  give 1l bolus.  EEG prelin negative for sieuzres.   2/: fingersticks high overnight,. VSS, mild hypotension improved.  Didn't toelrate MRI yesterday due to agitation.  12/2: got MRI yesterday, showing subdural and traumatic   12/3: pulled out NG tube, replaced.  failed swallow eval with FEES.    Overnight events: sundowning, confused overnight.  failed repeat swallow eval this morning. 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 Started on valcyclovir      Exam:   Gen: elderly man in NAD  CV: RRR  Pulm: coarse breath sounds in upper airway, sounds wet  MSE: awake, attends briskly, language fluent, oriented to month and year however not surewhat hospital he is, "I just came back from an exhibit"   CN: NATALIE, EOMI trace nystagmus, face symmetric, TUP midline,   Motor: 5/5 b/l, no drift      Allergies: No Known Allergies      EXAM:  VITALS:  T(C): 36.6 (12-04-19 @ 09:39), Max: 37.2 (12-03-19 @ 22:19)  HR: 92 (12-04-19 @ 04:19) (92 - 106)  BP: 102/52 (12-04-19 @ 04:19) (102/51 - 126/62)  RR: 18 (12-04-19 @ 04:19) (17 - 19)  SpO2: 97% (12-04-19 @ 04:19) (94% - 99%)    MEDICATIONS:  acetaminophen   Tablet .. 650 milliGRAM(s) Oral every 6 hours PRN  bisacodyl 5 milliGRAM(s) Oral at bedtime  dextrose 40% Gel 15 Gram(s) Oral once PRN  dextrose 5%. 1000 milliLiter(s) IV Continuous <Continuous>  dextrose 50% Injectable 12.5 Gram(s) IV Push once  enoxaparin Injectable 40 milliGRAM(s) SubCutaneous at bedtime  glucagon  Injectable 1 milliGRAM(s) IntraMuscular once PRN  hydrALAZINE Injectable 10 milliGRAM(s) IV Push every 1 hour PRN  insulin glargine Injectable (LANTUS) 16 Unit(s) SubCutaneous at bedtime  insulin lispro (HumaLOG) corrective regimen sliding scale   SubCutaneous Before meals and at bedtime  labetalol Injectable 10 milliGRAM(s) IV Push every 1 hour PRN  levETIRAcetam 1000 milliGRAM(s) Oral two times a day  potassium chloride   Powder 20 milliEquivalent(s) Oral once  potassium phosphate / sodium phosphate powder 1 Packet(s) Oral once  senna 2 Tablet(s) Oral at bedtime  valACYclovir 1000 milliGRAM(s) Oral every 8 hours      I/O's    12-03-19 @ 07:01  -  12-04-19 @ 07:00  --------------------------------------------------------  IN: 1890 mL / OUT: 1170 mL / NET: 720 mL    12-04-19 @ 07:01  -  12-04-19 @ 12:19  --------------------------------------------------------  IN: 55 mL / OUT: 0 mL / NET: 55 mL        LABS:                          11.0   9.50  )-----------( 164      ( 04 Dec 2019 07:03 )             35.6     12-04    138  |  101  |  23  ----------------------------<  195<H>  3.8   |  27  |  0.48<L>    Ca    9.1      04 Dec 2019 07:03  Phos  2.3     12-04  Mg     2.1     12-04      CAPILLARY BLOOD GLUCOSE      POCT Blood Glucose.: 135 mg/dL (04 Dec 2019 11:17)  POCT Blood Glucose.: 187 mg/dL (04 Dec 2019 08:37)  POCT Blood Glucose.: 183 mg/dL (04 Dec 2019 06:44)  POCT Blood Glucose.: 311 mg/dL (03 Dec 2019 22:51)  POCT Blood Glucose.: 286 mg/dL (03 Dec 2019 16:59)            Stroke core measures - TRAUMATIC SAH+SDH  -No indication for antiplatelet agent, echocardiogram, telemetry monitoring, telemetry monitoring, statin, lipid profile, or A1c  -Hunt-Zelaya scoring or nimodipine not applicable in traumatic hemorrhage

## 2019-12-04 NOTE — CHART NOTE - NSCHARTNOTEFT_GEN_A_CORE
Admitting Diagnosis:   Patient is a 89y old  Male who presents with a chief complaint of Mild TBI with traumatic SAH and SDH (04 Dec 2019 12:16)      PAST MEDICAL & SURGICAL HISTORY:  DM (diabetes mellitus)  H/O hernia repair      Current Nutrition Order:  Glucerna 1.5@55mL/hr via NGT (1320mL TV, 1980kcal, 108g pro, 1001mL free water, 132% RDI vitamin/mineral)    PO Intake: Good (%) [   ]  Fair (50-75%) [   ] Poor (<25%) [   ] N/A, EN dependant    GI Issues:   No apparent GI distress  no reports of N/V  Last BM 12/1- consider bowel regimen   Tolerating feeds well per RN    Pain:  No pain reported    Skin Integrity:  unstageable PU saccrum  stage II L buttocks  stage II R buttocks  R wrist, R elbow skin tear      Labs:   12-04    138  |  101  |  23  ----------------------------<  195<H>  3.8   |  27  |  0.48<L>    Ca    9.1      04 Dec 2019 07:03  Phos  2.3     12-04  Mg     2.1     12-04      CAPILLARY BLOOD GLUCOSE      POCT Blood Glucose.: 135 mg/dL (04 Dec 2019 11:17)  POCT Blood Glucose.: 187 mg/dL (04 Dec 2019 08:37)  POCT Blood Glucose.: 183 mg/dL (04 Dec 2019 06:44)  POCT Blood Glucose.: 311 mg/dL (03 Dec 2019 22:51)  POCT Blood Glucose.: 286 mg/dL (03 Dec 2019 16:59)      Medications:  MEDICATIONS  (STANDING):  bisacodyl 5 milliGRAM(s) Oral at bedtime  dextrose 5%. 1000 milliLiter(s) (50 mL/Hr) IV Continuous <Continuous>  dextrose 50% Injectable 12.5 Gram(s) IV Push once  enoxaparin Injectable 40 milliGRAM(s) SubCutaneous at bedtime  insulin glargine Injectable (LANTUS) 16 Unit(s) SubCutaneous at bedtime  insulin lispro (HumaLOG) corrective regimen sliding scale   SubCutaneous Before meals and at bedtime  levETIRAcetam 1000 milliGRAM(s) Oral two times a day  potassium chloride   Powder 20 milliEquivalent(s) Oral once  potassium phosphate / sodium phosphate powder 1 Packet(s) Oral once  senna 2 Tablet(s) Oral at bedtime  valACYclovir 1000 milliGRAM(s) Oral every 8 hours    MEDICATIONS  (PRN):  acetaminophen   Tablet .. 650 milliGRAM(s) Oral every 6 hours PRN Temp greater or equal to 38C (100.4F), Mild Pain (1 - 3)  dextrose 40% Gel 15 Gram(s) Oral once PRN Blood Glucose LESS THAN 70 milliGRAM(s)/deciliter  glucagon  Injectable 1 milliGRAM(s) IntraMuscular once PRN Glucose LESS THAN 70 milligrams/deciliter  hydrALAZINE Injectable 10 milliGRAM(s) IV Push every 1 hour PRN SBP > 140MMHG HR< 65  labetalol Injectable 10 milliGRAM(s) IV Push every 1 hour PRN Systolic blood pressure > 140MMHG hR<65      Weight: 141lbs (11/28)  Height: 5'10", IBW 166lbs+/-10%, %IBW 85%, BMI 20.2  11/28: 65.9kg  11/30: 64kg  12/4: 66.3kg    Weight Change:   Unable to obtain subjective information on diet/weight change. No family at bedside.  Weight has been relatively stable w. current feeding regimen    Estimated energy needs:   ABW used for calculations as pt between % of IBW.   Nutrient needs based on [facility] standards of care for maintenance in older adults.   Needs adjusted for suspected severe protein calorie malnutrition and catabolic state  1600-1920kcal/day (25-30kcal/kg)  102-115g pro/day (1.6-1.8g pro/kg)  Fluids per team.    Subjective:   89yo M w/ known DM2 s/p fall w/ traumatic SAH/SDH. CTH showed new small L occipital SDH 11/29. Pt remains aphasic and AVILES. NGT feeds were started on 11/2. Pt with suspected severe protein calorie malnutrition evidenced by visible muscle wasting and suspected inadequate kcal/pro intake to meeting increased needs w/ multiple pressure ulcers. Pt seen in room, resting in bed, muttering to himself. Remains EN dependant- feeds infusing at ordered goal rate. Took bed scale weight today which read 66.3kg, will continue to trend. Pt was evaluated by SLP today w/ recs to continue NPO status w/ NGT feeds. No apparent GI distress per RN, pt has been tolerating feeds well. No reports of N/V, last BM was 12/1- consider bowel regimen. Recommend adding on Zinc and MVI to promote wound healing. RD to follow.     Previous Nutrition Diagnosis:  severe protein calorie malnutrition RT unable to meet increased needs AEB hypermetabolic state/wounds  Active [  x ]  Resolved [   ]    If resolved, new PES:   Increased nutrient needs RT increased demand for kcal/pro AEB multiple pressure ulcers and visible muscle wasting     Goal:  Pt to consistently meet % of estimated needs via most appropriate route and avoid any further s/s malnutrition    Recommendations:  1. Continue w/ current EN regimen  2. Monitor for s/s intolerance. Maintain aspiration precautions at all times  3. Recommend MVI and Zinc to promote wound healing  4. Consider bowel regimen if pt is constipated  5. Trend weights to assess adequacy of feeds    Education:   N/A    Risk Level: High [ x  ] Moderate [   ] Low [   ]

## 2019-12-04 NOTE — PROGRESS NOTE ADULT - ASSESSMENT
89/M with DM found down with traumatic SAH.   Multifocal blood including left anterior temporal lboe and small trauamtic SDH in left parietoocciptal region.  Focal aphasia concerning for stroke or other focal lesion, but MRI negative and cognitive status now improved.    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  2) b/l mild carotid and aortic atherosclerosis on CTA.  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 However shows cortical superficial siderosis, may reflect underlying condition rather than trauma  -keppra 1g BID for ppx - continue for 1 week  -CT c-spine - no fractures, collar off    CV 1) hypotension with wide pulse pressure, resolved, echo without major abnormalities  -SBP <160 for traumatic SAH/SDH  -trop negative    ID: lactate cleared, panculture negative, no WBC, low suspicion for infectious process, monitor clinically    Renal: 1) mild hyponatremia, resolved  -trend BMP daily    Endocrine) 1) Large thyroid mass  2) DM at baseline, hb 7.0  -TFT's normal  -FS QID, ISS   -lantus 16 units QHS --> may need to increase today further based on daily dosing needs    GI:   -failed repeat swallow eval, need to discuss PEG with patient and family  -Need to discuss with ENT +/- GI if this is the cause of his dysphagia, or if needs further workup for mechanism  -diabetic tube feeds  -NPO until cleared    Heme/Onc: 1) neck mass of unclear etiology, pending results of biopsy per ENT  -SQL for ppx    Contact: has daughter, continue to try to reach  (Melani Mark - Child [phone] - left message)    Access:   PIV  condom cath  NG tube

## 2019-12-04 NOTE — BEHAVIORAL HEALTH ASSESSMENT NOTE - NSBHCHARTREVIEWIMAGING_PSY_A_CORE FT
PROCEDURE DATE:  11/29/2019          INTERPRETATION:  INDICATIONS: Subdural hemorrhage.    TECHNIQUE:  Serial axial images were obtained from the skull base to the   vertex without the use of intravenous contrast.    COMPARISON EXAMINATION: None.    FINDINGS:      Again noted are small acute extra-axial subdural hemorrhage and left   parietal-occipital region which tracks along the posterior   interhemispheric fissure measuring roughly 2 mm in width unchanged or   slightly diminished in size. .    Again identified are areas of superficial subarachnoid hemorrhage in left   temporal region with a left temporal acute subdural hemorrhage. There is   a small focus of hemorrhage seen in the left temporal lobe just above the   petrous ridge which is unchanged. There is some low-attenuation in this   area of the left temporal lobe which may represent contusion type injury.   These findings are unchanged given allowances for differences in   angulation and technique.    No new hemorrhage is seen. There is no midline shift. Again identified   are areas of right frontotemporal soft tissue swelling. There is   age-appropriate atrophy and chronic microangiopathic disease.     IMPRESSION:    1. No change in left occipital acute subdural hemorrhage, 2 mm in width,   extending along posterior left interhemispheric fissure, stable or   slightly diminished in size.  2. No change in superficial subarachnoid hemorrhage in left temporal lobe   with associatedleft acute temporal subdural hematoma given allowances   for differences in angulation in scan slice technique.   3. Diminished attenuation within the left temporal lobe may represent   contusion injury and/or edema, which is difficult to assess given streak   artifact in this region. Brain MR could be obtained for further   characterization if indicated clinically.

## 2019-12-04 NOTE — BEHAVIORAL HEALTH ASSESSMENT NOTE - CASE SUMMARY
I  spoke with pt, pt's nephew and his wife. Standing seroquel 12.5 mg qhs might help with psychotic sx of delirium and sleep. 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 Seems more lucid at this time but nephew confirms it's waxing and waning mental status.

## 2019-12-04 NOTE — BEHAVIORAL HEALTH ASSESSMENT NOTE - OTHER
Nephew Ed [facility]  multiple bruising noted on face and upper extremities deferred seeing his sister

## 2019-12-04 NOTE — BEHAVIORAL HEALTH ASSESSMENT NOTE - NSBHCHARTREVIEWVS_PSY_A_CORE FT
Vital Signs Last 24 Hrs  T(C): 36.4 (04 Dec 2019 17:00), Max: 37.2 (03 Dec 2019 22:19)  T(F): 97.6 (04 Dec 2019 17:00), Max: 98.9 (03 Dec 2019 22:19)  HR: 98 (04 Dec 2019 15:54) (90 - 106)  BP: 136/60 (04 Dec 2019 15:54) (102/51 - 136/60)  BP(mean): 87 (04 Dec 2019 15:54) (73 - 87)  RR: 18 (04 Dec 2019 15:54) (17 - 19)  SpO2: 96% (04 Dec 2019 15:54) (94% - 99%)

## 2019-12-04 NOTE — SWALLOW BEDSIDE ASSESSMENT ADULT - NS SPL SWALLOW CLINIC TRIAL FT
Adequate bolus acceptance. Reduced bolus control/coordination. Pt noted w/ disorganized and perseverative, non-functional munching pattern following swallow, w/ both ice and thin liquid, suspect 2/2 AMS. Pt presented w/ consistent wet/gurgly vocal quality, cough x1, and non-cued throat clear x1 following trials of thin liquids, suggestive of aspiration. This is consistent with results of FEES from 12/2. Trials terminated given persistent wet/gurgly vocal quality, unable to clear given multiple cues to cough.

## 2019-12-04 NOTE — PROGRESS NOTE ADULT - SUBJECTIVE AND OBJECTIVE BOX
HPI:  89 yo male pmhx DM  BIBEMS after being found down for unknown period of time by doorman who called ambulance for AMS. On admission patient noted to have scalp lacerations and wrist abrasion, CTH c/w small traumatic SAH with small SDH component. Patient is a poor historian, unable to provide ROS. Denies any pain. No family with patient. Minimal collateral EMR information. (28 Nov 2019 17:46)    OVERNIGHT EVENTS: Period of tachycardia at night to 130s, fluid bolus with some improvement. Patient agitated, delirious, on b/l wrist restraints.    Hospital Course:   11/28: HD # 0: admitted to ICU from ED for traumatic SAH/SDH. rpt imaging stable  11/29: HD #1: rpt CTH overnight shows new small left occipital SDH, repeated again this am for stability, which shows no increased bleeding. Exam unchanged, remains aphasic and AVILES. EEG placed to r/o seizure. NGT feeds started   11/30: HD# 2: hypotensive o/n, does not appear to be hemodynamically unstable, bedside echo performed, ruled out cardiac dysfunction / pericardial effusion / major valvular dysfunction, formal echo ordered. EEG removed after official read was negative, will remain on keppra.  MRI today.   12/01: Fails attempt to perform MRI yesterday due to agitation and hypotension. We were able  to localize family yesterday.  12/2: UVALDO o/n, neuro exam improves  12/3: UVALDO overnight, neuro stable. Patient has malnutrition as per nutritionist evaluation.  12/4: Delirium overnight, on wrist restraint. Period of tachycardia improved with IV Bolus. Pending S&S re-evaluation for PO diet.      Vital Signs Last 24 Hrs  T(C): 37.1 (04 Dec 2019 05:30), Max: 37.2 (03 Dec 2019 22:19)  T(F): 98.7 (04 Dec 2019 05:30), Max: 98.9 (03 Dec 2019 22:19)  HR: 92 (04 Dec 2019 04:19) (92 - 112)  BP: 102/52 (04 Dec 2019 04:19) (102/51 - 137/63)  BP(mean): 74 (04 Dec 2019 04:19) (73 - 91)  RR: 18 (04 Dec 2019 04:19) (17 - 19)  SpO2: 97% (04 Dec 2019 04:19) (94% - 99%)    I&O's Summary    02 Dec 2019 07:01  -  03 Dec 2019 07:00  --------------------------------------------------------  IN: 1220 mL / OUT: 1077 mL / NET: 143 mL    03 Dec 2019 07:01  -  04 Dec 2019 06:07  --------------------------------------------------------  IN: 1835 mL / OUT: 970 mL / NET: 865 mL        PHYSICAL EXAM:  Gen: NAD, AAOx2 with choices  HEENT: PERRL. EOMI. NC/AT.  Neck: FROM, nontender  Lungs: Clear b/l  Heart: S1, S2. NSR.  Abd: Soft, NT/ND. +S  Exts: Pulse s2+ throughout, mild ecchymosis over b/l distal UEs.  Neuro: CNs II-XII intact. 5/5 str x4 extremities. Sensation to LT intact. Dysarthric speech at times, confused. Gait not assessed.      TUBES/LINES:  [] Reyna  [] Lumbar Drain  [] Wound Drains  [] Others      DIET:  [] NPO  [] Mechanical  [x] Tube feeds    LABS:                        10.7   10.37 )-----------( 167      ( 03 Dec 2019 06:42 )             33.4     12-03    139  |  99  |  17  ----------------------------<  319<H>  4.0   |  26  |  0.48<L>    Ca    8.5      03 Dec 2019 06:42  Phos  2.5     12-03  Mg     1.8     12-03              CAPILLARY BLOOD GLUCOSE      POCT Blood Glucose.: 311 mg/dL (03 Dec 2019 22:51)  POCT Blood Glucose.: 286 mg/dL (03 Dec 2019 16:59)  POCT Blood Glucose.: 315 mg/dL (03 Dec 2019 11:49)  POCT Blood Glucose.: 302 mg/dL (03 Dec 2019 06:43)      Drug Levels: [] N/A    CSF Analysis: [] N/A      Allergies    No Known Allergies    Intolerances      MEDICATIONS:  Antibiotics:  valACYclovir 500 milliGRAM(s) Oral two times a day    Neuro:  acetaminophen   Tablet .. 650 milliGRAM(s) Oral every 6 hours PRN  levETIRAcetam 1000 milliGRAM(s) Oral two times a day    Anticoagulation:  enoxaparin Injectable 40 milliGRAM(s) SubCutaneous at bedtime    OTHER:  bisacodyl 5 milliGRAM(s) Oral at bedtime  dextrose 40% Gel 15 Gram(s) Oral once PRN  dextrose 50% Injectable 12.5 Gram(s) IV Push once  glucagon  Injectable 1 milliGRAM(s) IntraMuscular once PRN  hydrALAZINE Injectable 10 milliGRAM(s) IV Push every 1 hour PRN  insulin glargine Injectable (LANTUS) 16 Unit(s) SubCutaneous at bedtime  insulin lispro (HumaLOG) corrective regimen sliding scale   SubCutaneous Before meals and at bedtime  labetalol Injectable 10 milliGRAM(s) IV Push every 1 hour PRN  senna 2 Tablet(s) Oral at bedtime    IVF:  dextrose 5%. 1000 milliLiter(s) IV Continuous <Continuous>    CULTURES:  Culture Results:   No growth at 5 days. (11-28 @ 19:44)  Culture Results:   No growth at 5 days. (11-28 @ 19:44)    RADIOLOGY & ADDITIONAL TESTS:      ASSESSMENT: 89 year old male with known DM II s/p fall with traumatic SAH/SDH, resolving.    SUBARACHNOID BLEED  No pertinent family history in first degree relatives  Handoff  MEWS Score  DM (diabetes mellitus)  Subarachnoid bleed  H/O hernia repair  AMS  21  Sepsis, due to unspecified organism, unspecified whether acute organ dysfunction present      PLAN:  Continue neuro checks,  Tylenol PRN pain,  Keppra for seizure prophylaxis,  Continue wrist restraint for delirium and to prevent NGT removal,  Normotensive BP goals,  TFs via NGT,  S&S reevaluation for PO intake vs PEG placement,  Stool softeners PRN,  Paratrachial mass/Thyroid Nodule – f/u Dr. Bee as outpatient for ENT,  Sinus Tachycardia – improved with fluid bolus and calming patient at bedside,  Continue SCDs, SQL for DVT prophylaxis  PT/OT for OOB, Disposition planning,  D/w Dr. Morin.      Assessment:  Present when checked    []  GCS  E   V  M     Heart Failure: []Acute, [] acute on chronic , []chronic  Heart Failure:  [] Diastolic (HFpEF), [] Systolic (HFrEF), []Combined (HFpEF and HFrEF), [] RHF, [] Pulm HTN, [] Other    [] REY, [] ATN, [] AIN, [] other  [] CKD1, [] CKD2, [] CKD 3, [] CKD 4, [] CKD 5, []ESRD    Encephalopathy: [] Metabolic, [] Hepatic, [] toxic, [] Neurological, [] Other    Abnormal Nurtitional Status: [] malnurtition (see nutrition note), [ ]underweight: BMI < 19, [] morbid obesity: BMI >40, [] Cachexia    [] Sepsis  [] hypovolemic shock,[] cardiogenic shock, [] hemorrhagic shock, [] neuogenic shock  [] Acute Respiratory Failure  []Cerebral edema, [] Brain compression/ herniation,   [] Functional quadriplegia  [] Acute blood loss anemia

## 2019-12-04 NOTE — BEHAVIORAL HEALTH ASSESSMENT NOTE - NSBHCONSULTMEDSEVERE_PSY_A_CORE FT
haloperidol 0.5mg IM q12 hrs. please monitor EKG for QTc prolongation seroquel 12.5 mg q6h prn agitation or haloperidol 0.5mg IM/IV q6 hrs if PO refused. please monitor EKG for QTc prolongation

## 2019-12-05 DIAGNOSIS — J15.9 UNSPECIFIED BACTERIAL PNEUMONIA: ICD-10-CM

## 2019-12-05 DIAGNOSIS — F05 DELIRIUM DUE TO KNOWN PHYSIOLOGICAL CONDITION: ICD-10-CM

## 2019-12-05 DIAGNOSIS — E11.9 TYPE 2 DIABETES MELLITUS WITHOUT COMPLICATIONS: ICD-10-CM

## 2019-12-05 DIAGNOSIS — E04.1 NONTOXIC SINGLE THYROID NODULE: ICD-10-CM

## 2019-12-05 DIAGNOSIS — A41.9 SEPSIS, UNSPECIFIED ORGANISM: ICD-10-CM

## 2019-12-05 DIAGNOSIS — I60.9 NONTRAUMATIC SUBARACHNOID HEMORRHAGE, UNSPECIFIED: ICD-10-CM

## 2019-12-05 LAB
ANION GAP SERPL CALC-SCNC: 11 MMOL/L — SIGNIFICANT CHANGE UP (ref 5–17)
BUN SERPL-MCNC: 19 MG/DL — SIGNIFICANT CHANGE UP (ref 7–23)
CALCIUM SERPL-MCNC: 8.9 MG/DL — SIGNIFICANT CHANGE UP (ref 8.4–10.5)
CHLORIDE SERPL-SCNC: 102 MMOL/L — SIGNIFICANT CHANGE UP (ref 96–108)
CO2 SERPL-SCNC: 25 MMOL/L — SIGNIFICANT CHANGE UP (ref 22–31)
CREAT SERPL-MCNC: 0.43 MG/DL — LOW (ref 0.5–1.3)
GLUCOSE BLDC GLUCOMTR-MCNC: 146 MG/DL — HIGH (ref 70–99)
GLUCOSE BLDC GLUCOMTR-MCNC: 233 MG/DL — HIGH (ref 70–99)
GLUCOSE BLDC GLUCOMTR-MCNC: 244 MG/DL — HIGH (ref 70–99)
GLUCOSE BLDC GLUCOMTR-MCNC: 67 MG/DL — LOW (ref 70–99)
GLUCOSE BLDC GLUCOMTR-MCNC: 95 MG/DL — SIGNIFICANT CHANGE UP (ref 70–99)
GLUCOSE SERPL-MCNC: 180 MG/DL — HIGH (ref 70–99)
HCT VFR BLD CALC: 35.6 % — LOW (ref 39–50)
HGB BLD-MCNC: 11.3 G/DL — LOW (ref 13–17)
MCHC RBC-ENTMCNC: 31 PG — SIGNIFICANT CHANGE UP (ref 27–34)
MCHC RBC-ENTMCNC: 31.7 GM/DL — LOW (ref 32–36)
MCV RBC AUTO: 97.8 FL — SIGNIFICANT CHANGE UP (ref 80–100)
NRBC # BLD: 0 /100 WBCS — SIGNIFICANT CHANGE UP (ref 0–0)
PLATELET # BLD AUTO: 160 K/UL — SIGNIFICANT CHANGE UP (ref 150–400)
POTASSIUM SERPL-MCNC: 4.3 MMOL/L — SIGNIFICANT CHANGE UP (ref 3.5–5.3)
POTASSIUM SERPL-SCNC: 4.3 MMOL/L — SIGNIFICANT CHANGE UP (ref 3.5–5.3)
RBC # BLD: 3.64 M/UL — LOW (ref 4.2–5.8)
RBC # FLD: 15 % — HIGH (ref 10.3–14.5)
SODIUM SERPL-SCNC: 138 MMOL/L — SIGNIFICANT CHANGE UP (ref 135–145)
WBC # BLD: 11.07 K/UL — HIGH (ref 3.8–10.5)
WBC # FLD AUTO: 11.07 K/UL — HIGH (ref 3.8–10.5)

## 2019-12-05 PROCEDURE — 70450 CT HEAD/BRAIN W/O DYE: CPT | Mod: 26

## 2019-12-05 PROCEDURE — 99223 1ST HOSP IP/OBS HIGH 75: CPT | Mod: GC

## 2019-12-05 PROCEDURE — 71045 X-RAY EXAM CHEST 1 VIEW: CPT | Mod: 26

## 2019-12-05 PROCEDURE — 99233 SBSQ HOSP IP/OBS HIGH 50: CPT

## 2019-12-05 PROCEDURE — 74150 CT ABDOMEN W/O CONTRAST: CPT | Mod: 26

## 2019-12-05 PROCEDURE — 99232 SBSQ HOSP IP/OBS MODERATE 35: CPT

## 2019-12-05 RX ORDER — VANCOMYCIN HCL 1 G
VIAL (EA) INTRAVENOUS
Refills: 0 | Status: DISCONTINUED | OUTPATIENT
Start: 2019-12-05 | End: 2019-12-06

## 2019-12-05 RX ORDER — SODIUM CHLORIDE 9 MG/ML
500 INJECTION INTRAMUSCULAR; INTRAVENOUS; SUBCUTANEOUS ONCE
Refills: 0 | Status: COMPLETED | OUTPATIENT
Start: 2019-12-05 | End: 2019-12-05

## 2019-12-05 RX ORDER — PIPERACILLIN AND TAZOBACTAM 4; .5 G/20ML; G/20ML
4.5 INJECTION, POWDER, LYOPHILIZED, FOR SOLUTION INTRAVENOUS ONCE
Refills: 0 | Status: COMPLETED | OUTPATIENT
Start: 2019-12-05 | End: 2019-12-05

## 2019-12-05 RX ORDER — ACETAMINOPHEN 500 MG
650 TABLET ORAL EVERY 6 HOURS
Refills: 0 | Status: DISCONTINUED | OUTPATIENT
Start: 2019-12-05 | End: 2019-12-16

## 2019-12-05 RX ORDER — SODIUM CHLORIDE 9 MG/ML
1000 INJECTION INTRAMUSCULAR; INTRAVENOUS; SUBCUTANEOUS
Refills: 0 | Status: DISCONTINUED | OUTPATIENT
Start: 2019-12-06 | End: 2019-12-06

## 2019-12-05 RX ORDER — PIPERACILLIN AND TAZOBACTAM 4; .5 G/20ML; G/20ML
4.5 INJECTION, POWDER, LYOPHILIZED, FOR SOLUTION INTRAVENOUS EVERY 6 HOURS
Refills: 0 | Status: COMPLETED | OUTPATIENT
Start: 2019-12-05 | End: 2019-12-08

## 2019-12-05 RX ORDER — VANCOMYCIN HCL 1 G
1000 VIAL (EA) INTRAVENOUS EVERY 12 HOURS
Refills: 0 | Status: DISCONTINUED | OUTPATIENT
Start: 2019-12-06 | End: 2019-12-06

## 2019-12-05 RX ORDER — VANCOMYCIN HCL 1 G
1000 VIAL (EA) INTRAVENOUS ONCE
Refills: 0 | Status: COMPLETED | OUTPATIENT
Start: 2019-12-05 | End: 2019-12-05

## 2019-12-05 RX ADMIN — SODIUM CHLORIDE 1000 MILLILITER(S): 9 INJECTION INTRAMUSCULAR; INTRAVENOUS; SUBCUTANEOUS at 20:49

## 2019-12-05 RX ADMIN — Medication 4: at 16:46

## 2019-12-05 RX ADMIN — VALACYCLOVIR 1000 MILLIGRAM(S): 500 TABLET, FILM COATED ORAL at 23:11

## 2019-12-05 RX ADMIN — Medication 650 MILLIGRAM(S): at 17:58

## 2019-12-05 RX ADMIN — INSULIN GLARGINE 16 UNIT(S): 100 INJECTION, SOLUTION SUBCUTANEOUS at 22:33

## 2019-12-05 RX ADMIN — Medication 250 MILLIGRAM(S): at 18:39

## 2019-12-05 RX ADMIN — Medication 650 MILLIGRAM(S): at 19:27

## 2019-12-05 RX ADMIN — Medication 4: at 22:33

## 2019-12-05 RX ADMIN — SENNA PLUS 2 TABLET(S): 8.6 TABLET ORAL at 22:33

## 2019-12-05 RX ADMIN — SODIUM CHLORIDE 500 MILLILITER(S): 9 INJECTION INTRAMUSCULAR; INTRAVENOUS; SUBCUTANEOUS at 17:58

## 2019-12-05 RX ADMIN — Medication 10 MILLIGRAM(S): at 00:16

## 2019-12-05 RX ADMIN — Medication 5 MILLIGRAM(S): at 22:33

## 2019-12-05 RX ADMIN — VALACYCLOVIR 1000 MILLIGRAM(S): 500 TABLET, FILM COATED ORAL at 05:52

## 2019-12-05 RX ADMIN — LEVETIRACETAM 1000 MILLIGRAM(S): 250 TABLET, FILM COATED ORAL at 05:52

## 2019-12-05 RX ADMIN — PIPERACILLIN AND TAZOBACTAM 200 GRAM(S): 4; .5 INJECTION, POWDER, LYOPHILIZED, FOR SOLUTION INTRAVENOUS at 20:20

## 2019-12-05 RX ADMIN — VALACYCLOVIR 1000 MILLIGRAM(S): 500 TABLET, FILM COATED ORAL at 15:40

## 2019-12-05 RX ADMIN — PIPERACILLIN AND TAZOBACTAM 200 GRAM(S): 4; .5 INJECTION, POWDER, LYOPHILIZED, FOR SOLUTION INTRAVENOUS at 13:59

## 2019-12-05 NOTE — CONSULT NOTE ADULT - PROBLEM SELECTOR RECOMMENDATION 4
Continue insulin sliding scale. Maintain her blood sugar in the range between 140- 180, and not below 70. Monitor for hypoglycemia. Monitor blood sugar with advancing diet. Hold oral hypoglycemic agents during hospitalization. Adjust insulin.

## 2019-12-05 NOTE — PROGRESS NOTE ADULT - SUBJECTIVE AND OBJECTIVE BOX
S/Overnight events: UVALDO overnight    HPI:  89 yo male pmhx DM BIBEMS after being found down for unknown period of time by doorman who called ambulance for AMS. On admission patient noted to have scalp lacerations and wrist abrasion, CTH c/w small traumatic SAH with small SDH component. Patient is a poor historian, unable to provide ROS. Denies any pain. No family with patient. Minimal collateral EMR information. (2019 17:46)    Hospital Course:   : HD # 0: admitted to ICU from ED for traumatic SAH/SDH. rpt imaging stable  : HD #1: rpt CTH overnight shows new small left occipital SDH, repeated again this am for stability, which shows no increased bleeding. Exam unchanged, remains aphasic and AVILES. EEG placed to r/o seizure. NGT feeds started   : HD# 2: hypotensive o/n, does not appear to be hemodynamically unstable, bedside echo performed, ruled out cardiac dysfunction / pericardial effusion / major valvular dysfunction, formal echo ordered. EEG removed after official read was negative, will remain on keppra.  MRI today.   : Fails attempt to perform MRI yesterday due to agitation and hypotension. We were able to localize family yesterday.  12/: UVALDO o/n, neuro exam improves  12/3: UVALDO overnight, neuro stable. Patient has malnutrition as per nutritionist evaluation.   : Delirium overnight, on wrist restraint. Period of tachycardia improved with IV Bolus. Pending S&S re-evaluation for PO diet.  : Failed speech and swallow eval. UVALDO overnight. Difficulty arousing patient, transfer back to ICU       Vital Signs Last 24 Hrs  T(C): 36.9 (05 Dec 2019 09:00), Max: 37.2 (04 Dec 2019 22:04)  T(F): 98.5 (05 Dec 2019 09:00), Max: 98.9 (04 Dec 2019 22:04)  HR: 100 (05 Dec 2019 08:33) (98 - 120)  BP: 119/58 (05 Dec 2019 08:33) (119/58 - 162/70)  BP(mean): 83 (05 Dec 2019 08:33) (83 - 100)  RR: 18 (05 Dec 2019 08:33) (17 - 18)  SpO2: 95% (05 Dec 2019 08:33) (95% - 97%)    I&O's Detail    04 Dec 2019 07:  -  05 Dec 2019 07:00  --------------------------------------------------------  IN:    Glucerna 1.5: 935 mL    sodium chloride 0.9%.: 540 mL  Total IN: 1475 mL    OUT:    Incontinent per Collection Ba mL    Voided: 350 mL  Total OUT: 1350 mL    Total NET: 125 mL      05 Dec 2019 07:  -  05 Dec 2019 11:10  --------------------------------------------------------  IN:    sodium chloride 0.9%.: 180 mL  Total IN: 180 mL    OUT:    Incontinent per Collection Ba mL  Total OUT: 1 mL    Total NET: 179 mL        I&O's Summary    04 Dec 2019 07:  -  05 Dec 2019 07:00  --------------------------------------------------------  IN: 1475 mL / OUT: 1350 mL / NET: 125 mL    05 Dec 2019 07  -  05 Dec 2019 11:10  --------------------------------------------------------  IN: 180 mL / OUT: 1 mL / NET: 179 mL        PHYSICAL EXAM:  Gen: Difficulty arousing pt, oriented to place  HEENT: PERRL, EOMI could not be assessed  Neck: trachea midline  Lungs: clear lung sounds at bases B/L, upper airway rhonchi   Heart: +S1/S2, normal rate and rhythm  Abd: soft, non-tender, non-distended, normoactive +BS  Exts: no edema, 2+ pulses throughout  Neuro: OE to stimuli, stuck out tongue, does not follow other commands, difficulty assessing CNs and movement of extremities, +2 reflexes     TUBES/LINES:  [] CVC  [] A-line  [] Lumbar Drain  [] Ventriculostomy  [x] NGT     DIET:  [x] NPO  [] Mechanical  [] Tube feeds    LABS:                        11.0   9.50  )-----------( 164      ( 04 Dec 2019 07:03 )             35.6     12-04    138  |  101  |  23  ----------------------------<  195<H>  3.8   |  27  |  0.48<L>    Ca    9.1      04 Dec 2019 07:03  Phos  2.3     12-  Mg     2.1     12-              CAPILLARY BLOOD GLUCOSE      POCT Blood Glucose.: 95 mg/dL (05 Dec 2019 10:19)  POCT Blood Glucose.: 67 mg/dL (05 Dec 2019 06:18)  POCT Blood Glucose.: 263 mg/dL (04 Dec 2019 21:48)  POCT Blood Glucose.: 224 mg/dL (04 Dec 2019 16:57)  POCT Blood Glucose.: 135 mg/dL (04 Dec 2019 11:17)      Drug Levels: [] N/A    CSF Analysis: [] N/A      Allergies    No Known Allergies    Intolerances      MEDICATIONS:  Antibiotics:  valACYclovir 1000 milliGRAM(s) Oral every 8 hours    Neuro:  acetaminophen   Tablet .. 650 milliGRAM(s) Oral every 6 hours PRN  levETIRAcetam 1000 milliGRAM(s) Oral two times a day    Anticoagulation:    OTHER:  bisacodyl 5 milliGRAM(s) Oral at bedtime  dextrose 40% Gel 15 Gram(s) Oral once PRN  dextrose 50% Injectable 12.5 Gram(s) IV Push once  glucagon  Injectable 1 milliGRAM(s) IntraMuscular once PRN  hydrALAZINE Injectable 10 milliGRAM(s) IV Push every 1 hour PRN  insulin glargine Injectable (LANTUS) 16 Unit(s) SubCutaneous at bedtime  insulin lispro (HumaLOG) corrective regimen sliding scale   SubCutaneous Before meals and at bedtime  labetalol Injectable 10 milliGRAM(s) IV Push every 1 hour PRN  senna 2 Tablet(s) Oral at bedtime    IVF:  dextrose 5%. 1000 milliLiter(s) IV Continuous <Continuous>  sodium chloride 0.9%. 1000 milliLiter(s) IV Continuous <Continuous>    CULTURES:  Culture Results:   No growth at 5 days. ( @ 19:44)  Culture Results:   No growth at 5 days. ( @ 19:44)    RADIOLOGY & ADDITIONAL TESTS:      ASSESSMENT:  89y Male with known DM II s/p fall with traumatic SAH/SDH, resolving.    SUBARACHNOID BLEED  No pertinent family history in first degree relatives  Handoff  MEWS Score  DM (diabetes mellitus)  Subarachnoid bleed  Delirium  Delirium  H/O hernia repair  AMS  21  Sepsis, due to unspecified organism, unspecified whether acute organ dysfunction present      PLAN:  NEURO: cont neurochecks, vital checks, pain meds PRN, Keppra for seizure ppx, cont wrist restraint for delirium and prevention of NGT removal   CT head abd pending results     CARDIOVASCULAR: normotensive BP goals, daily labs, electrolyte repletion PRN     PULMONARY: continue suction, monitor for desaturation, cont 3L O2 via NC     RENAL: cont IVF     GI: currently NPO since midnight  Previously receiving TF via NGT  Pending PEG placement after CT scan   Bowel regimen      ID: afebrile    ENDO: cont ISS    DVT PROPHYLAXIS: SCDs    DISPOSITION: transfer to ICU    Above d/w Dr. Morin     Assessment:  Present when checked    []  GCS  E   V  M     Heart Failure: []Acute, [] acute on chronic , []chronic  Heart Failure:  [] Diastolic (HFpEF), [] Systolic (HFrEF), []Combined (HFpEF and HFrEF), [] RHF, [] Pulm HTN, [] Other    [] REY, [] ATN, [] AIN, [] other  [] CKD1, [] CKD2, [] CKD 3, [] CKD 4, [] CKD 5, []ESRD    Encephalopathy: [] Metabolic, [] Hepatic, [] toxic, [] Neurological, [] Other    Abnormal Nurtitional Status: [] malnurtition (see nutrition note), [ ]underweight: BMI < 19, [] morbid obesity: BMI >40, [] Cachexia    [] Sepsis  [] hypovolemic shock,[] cardiogenic shock, [] hemorrhagic shock, [] neuogenic shock  [] Acute Respiratory Failure  []Cerebral edema, [] Brain compression/ herniation,   [] Functional quadriplegia  [] Acute blood loss anemia S/Overnight events: UVALDO overnight    HPI:  89 yo male pmhx DM BIBEMS after being found down for unknown period of time by doorman who called ambulance for AMS. On admission patient noted to have scalp lacerations and wrist abrasion, CTH c/w small traumatic SAH with small SDH component. Patient is a poor historian, unable to provide ROS. Denies any pain. No family with patient. Minimal collateral EMR information. (2019 17:46)    Hospital Course:   : HD # 0: admitted to ICU from ED for traumatic SAH/SDH. rpt imaging stable  : HD #1: rpt CTH overnight shows new small left occipital SDH, repeated again this am for stability, which shows no increased bleeding. Exam unchanged, remains aphasic and AVILES. EEG placed to r/o seizure. NGT feeds started   : HD# 2: hypotensive o/n, does not appear to be hemodynamically unstable, bedside echo performed, ruled out cardiac dysfunction / pericardial effusion / major valvular dysfunction, formal echo ordered. EEG removed after official read was negative, will remain on keppra.  MRI today.   : Fails attempt to perform MRI yesterday due to agitation and hypotension. We were able to localize family yesterday.  12/: UVALDO o/n, neuro exam improves  12/3: UVALDO overnight, neuro stable. Patient has malnutrition as per nutritionist evaluation.   : Delirium overnight, on wrist restraint. Period of tachycardia improved with IV Bolus. Pending S&S re-evaluation for PO diet.  : Failed speech and swallow eval. UVALDO overnight. Difficulty arousing patient, transferred back to ICU. CTH stable. Copious secretions.      Vital Signs Last 24 Hrs  T(C): 36.9 (05 Dec 2019 09:00), Max: 37.2 (04 Dec 2019 22:04)  T(F): 98.5 (05 Dec 2019 09:00), Max: 98.9 (04 Dec 2019 22:04)  HR: 100 (05 Dec 2019 08:33) (98 - 120)  BP: 119/58 (05 Dec 2019 08:33) (119/58 - 162/70)  BP(mean): 83 (05 Dec 2019 08:33) (83 - 100)  RR: 18 (05 Dec 2019 08:33) (17 - 18)  SpO2: 95% (05 Dec 2019 08:33) (95% - 97%)    I&O's Detail    04 Dec 2019 07:  -  05 Dec 2019 07:00  --------------------------------------------------------  IN:    Glucerna 1.5: 935 mL    sodium chloride 0.9%.: 540 mL  Total IN: 1475 mL    OUT:    Incontinent per Collection Ba mL    Voided: 350 mL  Total OUT: 1350 mL    Total NET: 125 mL      05 Dec 2019 07:  -  05 Dec 2019 11:10  --------------------------------------------------------  IN:    sodium chloride 0.9%.: 180 mL  Total IN: 180 mL    OUT:    Incontinent per Collection Ba mL  Total OUT: 1 mL    Total NET: 179 mL        I&O's Summary    04 Dec 2019 07:  -  05 Dec 2019 07:00  --------------------------------------------------------  IN: 1475 mL / OUT: 1350 mL / NET: 125 mL    05 Dec 2019 07:  -  05 Dec 2019 11:10  --------------------------------------------------------  IN: 180 mL / OUT: 1 mL / NET: 179 mL        PHYSICAL EXAM:  Gen: Difficulty arousing pt, oriented to place  HEENT: PERRL, EOMI could not be assessed 2/2 Pt compliance  Neck: trachea midline  Lungs: crackles at bases B/L, upper airway rhonchi   Heart: +S1/S2, normal rate and rhythm  Abd: soft, non-tender, non-distended, normoactive +BS  Exts: no edema, 2+ pulses throughout  Neuro: OE to stimuli, follows simple commands (sticks out tongue,) difficulty assessing CNs and movement of extremities, +2 reflexes symmetric     TUBES/LINES:  [] CVC  [] A-line  [] Lumbar Drain  [] Ventriculostomy  [x] NGT     DIET:  [x] NPO  [] Mechanical  [] Tube feeds    LABS:                        11.0   9.50  )-----------( 164      ( 04 Dec 2019 07:03 )             35.6     12-04    138  |  101  |  23  ----------------------------<  195<H>  3.8   |  27  |  0.48<L>    Ca    9.1      04 Dec 2019 07:03  Phos  2.3     12  Mg     2.1     12              CAPILLARY BLOOD GLUCOSE      POCT Blood Glucose.: 95 mg/dL (05 Dec 2019 10:19)  POCT Blood Glucose.: 67 mg/dL (05 Dec 2019 06:18)  POCT Blood Glucose.: 263 mg/dL (04 Dec 2019 21:48)  POCT Blood Glucose.: 224 mg/dL (04 Dec 2019 16:57)  POCT Blood Glucose.: 135 mg/dL (04 Dec 2019 11:17)      Drug Levels: [] N/A    CSF Analysis: [] N/A      Allergies    No Known Allergies    Intolerances      MEDICATIONS:  Antibiotics:  valACYclovir 1000 milliGRAM(s) Oral every 8 hours    Neuro:  acetaminophen   Tablet .. 650 milliGRAM(s) Oral every 6 hours PRN  levETIRAcetam 1000 milliGRAM(s) Oral two times a day    Anticoagulation:    OTHER:  bisacodyl 5 milliGRAM(s) Oral at bedtime  dextrose 40% Gel 15 Gram(s) Oral once PRN  dextrose 50% Injectable 12.5 Gram(s) IV Push once  glucagon  Injectable 1 milliGRAM(s) IntraMuscular once PRN  hydrALAZINE Injectable 10 milliGRAM(s) IV Push every 1 hour PRN  insulin glargine Injectable (LANTUS) 16 Unit(s) SubCutaneous at bedtime  insulin lispro (HumaLOG) corrective regimen sliding scale   SubCutaneous Before meals and at bedtime  labetalol Injectable 10 milliGRAM(s) IV Push every 1 hour PRN  senna 2 Tablet(s) Oral at bedtime    IVF:  dextrose 5%. 1000 milliLiter(s) IV Continuous <Continuous>  sodium chloride 0.9%. 1000 milliLiter(s) IV Continuous <Continuous>    CULTURES:  Culture Results:   No growth at 5 days. ( @ 19:44)  Culture Results:   No growth at 5 days. ( @ 19:44)    RADIOLOGY & ADDITIONAL TESTS:      ASSESSMENT:  89y Male with known DM II s/p fall with traumatic SAH/SDH, resolving.    SUBARACHNOID BLEED  No pertinent family history in first degree relatives  Handoff  MEWS Score  DM (diabetes mellitus)  Subarachnoid bleed  Delirium  Delirium  H/O hernia repair  AMS  21  Sepsis, due to unspecified organism, unspecified whether acute organ dysfunction present      PLAN:  NEURO: cont neuro checks, vital checks, pain meds PRN, discontinue keppra for seizure ppx, cont wrist restraint for delirium and prevention of NGT removal   CTH reviewed with Dr. Morin and stable    CARDIOVASCULAR: normotensive BP goals, daily labs, electrolyte repletion PRN     PULMONARY: continue suctioning for secretions, monitor for desaturation, NC prn, chest PT, incentive spirometry    RENAL: discontinue fluids    GI: resume peg tube feeds  Pending PEG placement, CT abdomen per IR prior to peg     ID: afebrile, CT chest with suspicion for pneumonitis: start zosyn    ENDO: cont ISS    DVT PROPHYLAXIS: SCDs, SQL    DISPOSITION: ICU status, full code, pending dispo    Above d/w Dr. Morin and Dr. Trinidad    Assessment:  Present when checked    []  GCS  E   V  M     Heart Failure: []Acute, [] acute on chronic , []chronic  Heart Failure:  [] Diastolic (HFpEF), [] Systolic (HFrEF), []Combined (HFpEF and HFrEF), [] RHF, [] Pulm HTN, [] Other    [] REY, [] ATN, [] AIN, [] other  [] CKD1, [] CKD2, [] CKD 3, [] CKD 4, [] CKD 5, []ESRD    Encephalopathy: [] Metabolic, [] Hepatic, [] toxic, [] Neurological, [] Other    Abnormal Nurtitional Status: [] malnurtition (see nutrition note), [ ]underweight: BMI < 19, [] morbid obesity: BMI >40, [] Cachexia    [] Sepsis  [] hypovolemic shock,[] cardiogenic shock, [] hemorrhagic shock, [] neuogenic shock  [] Acute Respiratory Failure  []Cerebral edema, [] Brain compression/ herniation,   [] Functional quadriplegia  [] Acute blood loss anemia

## 2019-12-05 NOTE — PROVIDER CONTACT NOTE (CHANGE IN STATUS NOTIFICATION) - SITUATION
Pt only responsive to painful stimulation. Garbled speech on verbalization. Desaturation to 87% on RA. Oral suctioning needed, but patient clamps down on suction catheter.

## 2019-12-05 NOTE — PROGRESS NOTE ADULT - SUBJECTIVE AND OBJECTIVE BOX
88 yo male pmhx DM  BIBEMS after being found down for unknown period of time by doorman who called ambulance for AMS. On admission patient noted to have scalp lacerations and wrist abrasion, CTH c/w small traumatic SAH with small SDH component. Patient is a poor historian, unable to provide ROS. Denies any pain. No family with patient. Minimal collateral EMR information.     PMH: unable to obtain direct history.   per chart review:  DM on insulin and oral meds  distant smoker  Colon CA (cured? gets screening colonoscopies)  Hx of dyspnea on exertion with normal echo (EF 60%) and stress test in Feb 2018.    Weight loss since 2018  Hernia repair    11/29: repeat CTH showing new small SDH in left posterior partietooccipital lobe.   11/30: mild hypotension overnight with wide pulse pressure.  agitated at times with 1mg haldol yesterday,.  give 1l bolus.  EEG prelin negative for sieuzres.   2/: fingersticks high overnight,. VSS, mild hypotension improved.  Didn't toelrate MRI yesterday due to agitation.  12/2: got MRI yesterday, showing subdural and traumatic   12/3: pulled out NG tube, replaced.  failed swallow eval with FEES.  12/4: sundowning, confused overnight.  failed repeat swallow eval this morning. 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 Started on valcyclovir    Overnight events: evidence of aspiration overnight, wet breath sounds on exam.  no other actue events      Exam:   Gen: elderly man in NAD  CV: RRR  Pulm: coarse breath sounds in upper airway, sounds wet on exam    MSE: awake, attends though slightly somnolent, language fluent, oriented to month and hospital only.  CN: NATALIE, EOMI trace nystagmus, face symmetric, TUP midline,   Motor: 5/5 b/l, no drift    Allergies: No Known Allergies      EXAM:  VITALS:  T(C): 36.7 (12-05-19 @ 12:00), Max: 37.2 (12-04-19 @ 22:04)  HR: 94 (12-05-19 @ 13:00) (94 - 120)  BP: 124/58 (12-05-19 @ 13:00) (111/54 - 162/70)  RR: 24 (12-05-19 @ 13:00) (17 - 24)  SpO2: 97% (12-05-19 @ 13:00) (92% - 97%)    MEDICATIONS:  acetaminophen   Tablet .. 650 milliGRAM(s) Oral every 6 hours PRN  bisacodyl 5 milliGRAM(s) Oral at bedtime  dextrose 40% Gel 15 Gram(s) Oral once PRN  dextrose 5%. 1000 milliLiter(s) IV Continuous <Continuous>  dextrose 50% Injectable 12.5 Gram(s) IV Push once  glucagon  Injectable 1 milliGRAM(s) IntraMuscular once PRN  hydrALAZINE Injectable 10 milliGRAM(s) IV Push every 1 hour PRN  insulin glargine Injectable (LANTUS) 16 Unit(s) SubCutaneous at bedtime  insulin lispro (HumaLOG) corrective regimen sliding scale   SubCutaneous Before meals and at bedtime  labetalol Injectable 10 milliGRAM(s) IV Push every 1 hour PRN  levETIRAcetam 1000 milliGRAM(s) Oral two times a day  senna 2 Tablet(s) Oral at bedtime  sodium chloride 0.9%. 1000 milliLiter(s) IV Continuous <Continuous>  valACYclovir 1000 milliGRAM(s) Oral every 8 hours      I/O's    12-04-19 @ 07:01  -  12-05-19 @ 07:00  --------------------------------------------------------  IN: 1475 mL / OUT: 1350 mL / NET: 125 mL    12-05-19 @ 07:01  -  12-05-19 @ 13:21  --------------------------------------------------------  IN: 180 mL / OUT: 1 mL / NET: 179 mL        Ventilator data:      LABS:                          11.0   9.50  )-----------( 164      ( 04 Dec 2019 07:03 )             35.6     12-04    138  |  101  |  23  ----------------------------<  195<H>  3.8   |  27  |  0.48<L>    Ca    9.1      04 Dec 2019 07:03  Phos  2.3     12-04  Mg     2.1     12-04              CAPILLARY BLOOD GLUCOSE      POCT Blood Glucose.: 146 mg/dL (05 Dec 2019 12:35)  POCT Blood Glucose.: 95 mg/dL (05 Dec 2019 10:19)  POCT Blood Glucose.: 67 mg/dL (05 Dec 2019 06:18)  POCT Blood Glucose.: 263 mg/dL (04 Dec 2019 21:48)  POCT Blood Glucose.: 224 mg/dL (04 Dec 2019 16:57)            Stroke core measures - TRAUMATIC SAH+SDH  -No indication for antiplatelet agent, echocardiogram, telemetry monitoring, telemetry monitoring, statin, lipid profile, or A1c  -Hunt-Zelaya scoring or nimodipine not applicable in traumatic hemorrhage

## 2019-12-05 NOTE — PROGRESS NOTE BEHAVIORAL HEALTH - NSBHADMITCOUNSELOTHER_PSY_A_CORE FT
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

## 2019-12-05 NOTE — PROGRESS NOTE ADULT - ASSESSMENT
89/M with DM found down with traumatic SAH.   Multifocal blood including left anterior temporal lboe and small trauamtic SDH in left parietoocciptal region.  Focal aphasia concerning for stroke or other focal lesion, but MRI negative and cognitive status now improved. New aspiration pneumonitis vs pna.    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  2) b/l mild carotid and aortic atherosclerosis on CTA.  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 However shows cortical superficial siderosis, may reflect underlying condition rather than trauma  -keppra 1g BID for ppx - s/p 1 week, dc today  -CT c-spine - no fractures, collar off    CV 1) hypotension with wide pulse pressure, resolved, echo without major abnormalities  -SBP <160 for traumatic SAH/SDH  -trop negative    Pulm) Aspiration pneumonitis vs PNA  -aggressive manual chest PT, discussed with nurse    ID: Likely R aspiration pneumonitis, less likely true PNA - no leukocytosis, no fever  -start zosyn today, plan for procalcitonin on abx day 3, possible early discontinuation  -3 days valacyclovir for known 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    Renal: 1) mild hyponatremia, resolved  -trend BMP daily    Endocrine) 1) Large thyroid mass  2) DM at baseline, hb 7.0  -TFT's normal  -FS QID, ISS   -lantus 16 units QHS , resume tube feeding to avoid hypoglycemia    GI:   -failed repeat swallow eval, need to discuss PEG with patient and family  -Need to discuss with ENT +/- GI if this is the cause of his dysphagia, or if needs further workup for mechanism for achalasia, etc  -diabetic tube feeds    Heme/Onc: 1) neck mass of unclear etiology, pending results of biopsy per ENT  -SQL for ppx    Contact: has daughter, continue to try to reach  (Melani Mark - Child [phone] - left message)    Access:   PIV  condom cath  NG tube

## 2019-12-05 NOTE — PROGRESS NOTE BEHAVIORAL HEALTH - NSBHFUPINTERVALCCFT_PSY_A_CORE
89yo  M w/ no PPH, PMH Diabetes, s/p unwitnessed fall with TBI, Traumatic SAH stable, SDH. Psychiatry consulted for delirium and visual hallucination. Patient's current presentation is likely delirium secondary to head trauma, although other insidious causes of delirium should be ruled out. Patient has high baseline functioning, biologically there is no psychiatric/substance history which could contribute to his current symptomatology. Psychologically, he is independent, confident, and cooperative and pleasant, was not combative during this interview. He denies depression/manic symptoms and denies paranoid ideation. He has visual hallucination of seeing his sister but denies it during current assessment. Socially, patient has supportive family members and stable housing but may require more assistance as he lives by himself and only recently had a HHA who comes once every week.

## 2019-12-05 NOTE — PROGRESS NOTE BEHAVIORAL HEALTH - NSBHCONSULTMEDSEVERE_PSY_A_CORE FT
seroquel 12.5 mg q6h prn agitation or haloperidol 0.5mg IM/IV q6 hrs if PO refused. please monitor EKG for QTc prolongation

## 2019-12-05 NOTE — PROGRESS NOTE BEHAVIORAL HEALTH - NSBHFUPINTERVALHXFT_PSY_A_CORE
Today pt was very out of it this morning, unable to answer questions, not making sense, and poor level of attention. Imaging repeated and pt transferred to ICU. When I went back to assess him in the afternoon, he was doing much better. Talkative. Able to ask about his treatment, A&0x3, and talking about his work as an  again. Complaining of chest pain and coughing a lot.

## 2019-12-05 NOTE — PROGRESS NOTE BEHAVIORAL HEALTH - PROBLEM SELECTOR PLAN 1
May start quetiapine 12.5mg at bedtime to treat psychotic symptoms  If patient becomes agitated, may administer 12.5 mg seroquel q6h prn agitation or haloperidol 0.5mg IM/IV if refusing PO  Behavioral management: reorient patient daily    Rule out any other reversible causes of delirium: suggest to order Urinalysis given urinary/fecal incontinence, monitor electrolytes abnormalities, and reassess TBI sequelae.

## 2019-12-05 NOTE — PROGRESS NOTE BEHAVIORAL HEALTH - RISK ASSESSMENT
Chronic risk factor: old age,  history, male sex, living by self  modifiable risk factor: delirium, SAH, poor oral intake, needs assistance  protective factor: no suicide/MH history, supportive family members, stable housing, engaged in treatment, high baseline functioning    Current risk for self harm is low.

## 2019-12-06 DIAGNOSIS — R63.8 OTHER SYMPTOMS AND SIGNS CONCERNING FOOD AND FLUID INTAKE: ICD-10-CM

## 2019-12-06 DIAGNOSIS — Z91.89 OTHER SPECIFIED PERSONAL RISK FACTORS, NOT ELSEWHERE CLASSIFIED: ICD-10-CM

## 2019-12-06 DIAGNOSIS — L89.90 PRESSURE ULCER OF UNSPECIFIED SITE, UNSPECIFIED STAGE: ICD-10-CM

## 2019-12-06 DIAGNOSIS — R13.10 DYSPHAGIA, UNSPECIFIED: ICD-10-CM

## 2019-12-06 LAB
ANION GAP SERPL CALC-SCNC: 10 MMOL/L — SIGNIFICANT CHANGE UP (ref 5–17)
APTT BLD: 31 SEC — SIGNIFICANT CHANGE UP (ref 27.5–36.3)
BUN SERPL-MCNC: 21 MG/DL — SIGNIFICANT CHANGE UP (ref 7–23)
CALCIUM SERPL-MCNC: 8.5 MG/DL — SIGNIFICANT CHANGE UP (ref 8.4–10.5)
CHLORIDE SERPL-SCNC: 101 MMOL/L — SIGNIFICANT CHANGE UP (ref 96–108)
CO2 SERPL-SCNC: 25 MMOL/L — SIGNIFICANT CHANGE UP (ref 22–31)
CREAT SERPL-MCNC: 0.47 MG/DL — LOW (ref 0.5–1.3)
GLUCOSE BLDC GLUCOMTR-MCNC: 129 MG/DL — HIGH (ref 70–99)
GLUCOSE BLDC GLUCOMTR-MCNC: 144 MG/DL — HIGH (ref 70–99)
GLUCOSE BLDC GLUCOMTR-MCNC: 148 MG/DL — HIGH (ref 70–99)
GLUCOSE BLDC GLUCOMTR-MCNC: 171 MG/DL — HIGH (ref 70–99)
GLUCOSE BLDC GLUCOMTR-MCNC: 251 MG/DL — HIGH (ref 70–99)
GLUCOSE BLDC GLUCOMTR-MCNC: 254 MG/DL — HIGH (ref 70–99)
GLUCOSE SERPL-MCNC: 183 MG/DL — HIGH (ref 70–99)
HCT VFR BLD CALC: 30.2 % — LOW (ref 39–50)
HGB BLD-MCNC: 9.8 G/DL — LOW (ref 13–17)
INR BLD: 1.24 — HIGH (ref 0.88–1.16)
MAGNESIUM SERPL-MCNC: 2 MG/DL — SIGNIFICANT CHANGE UP (ref 1.6–2.6)
MCHC RBC-ENTMCNC: 31.1 PG — SIGNIFICANT CHANGE UP (ref 27–34)
MCHC RBC-ENTMCNC: 32.5 GM/DL — SIGNIFICANT CHANGE UP (ref 32–36)
MCV RBC AUTO: 95.9 FL — SIGNIFICANT CHANGE UP (ref 80–100)
NRBC # BLD: 0 /100 WBCS — SIGNIFICANT CHANGE UP (ref 0–0)
PHOSPHATE SERPL-MCNC: 3.2 MG/DL — SIGNIFICANT CHANGE UP (ref 2.5–4.5)
PLATELET # BLD AUTO: 166 K/UL — SIGNIFICANT CHANGE UP (ref 150–400)
POTASSIUM SERPL-MCNC: 3.6 MMOL/L — SIGNIFICANT CHANGE UP (ref 3.5–5.3)
POTASSIUM SERPL-SCNC: 3.6 MMOL/L — SIGNIFICANT CHANGE UP (ref 3.5–5.3)
PROTHROM AB SERPL-ACNC: 14.1 SEC — HIGH (ref 10–12.9)
RBC # BLD: 3.15 M/UL — LOW (ref 4.2–5.8)
RBC # FLD: 14.9 % — HIGH (ref 10.3–14.5)
SODIUM SERPL-SCNC: 136 MMOL/L — SIGNIFICANT CHANGE UP (ref 135–145)
WBC # BLD: 10.71 K/UL — HIGH (ref 3.8–10.5)
WBC # FLD AUTO: 10.71 K/UL — HIGH (ref 3.8–10.5)

## 2019-12-06 PROCEDURE — 99233 SBSQ HOSP IP/OBS HIGH 50: CPT | Mod: GC

## 2019-12-06 PROCEDURE — 93010 ELECTROCARDIOGRAM REPORT: CPT

## 2019-12-06 PROCEDURE — 99232 SBSQ HOSP IP/OBS MODERATE 35: CPT

## 2019-12-06 PROCEDURE — 49440 PLACE GASTROSTOMY TUBE PERC: CPT

## 2019-12-06 RX ORDER — CHLORHEXIDINE GLUCONATE 213 G/1000ML
1 SOLUTION TOPICAL
Refills: 0 | Status: DISCONTINUED | OUTPATIENT
Start: 2019-12-06 | End: 2019-12-06

## 2019-12-06 RX ORDER — ZINC SULFATE TAB 220 MG (50 MG ZINC EQUIVALENT) 220 (50 ZN) MG
220 TAB ORAL DAILY
Refills: 0 | Status: COMPLETED | OUTPATIENT
Start: 2019-12-06 | End: 2019-12-16

## 2019-12-06 RX ORDER — POTASSIUM CHLORIDE 20 MEQ
40 PACKET (EA) ORAL ONCE
Refills: 0 | Status: COMPLETED | OUTPATIENT
Start: 2019-12-06 | End: 2019-12-06

## 2019-12-06 RX ORDER — ASCORBIC ACID 60 MG
500 TABLET,CHEWABLE ORAL DAILY
Refills: 0 | Status: DISCONTINUED | OUTPATIENT
Start: 2019-12-06 | End: 2019-12-16

## 2019-12-06 RX ORDER — VANCOMYCIN HCL 1 G
1000 VIAL (EA) INTRAVENOUS EVERY 12 HOURS
Refills: 0 | Status: DISCONTINUED | OUTPATIENT
Start: 2019-12-07 | End: 2019-12-07

## 2019-12-06 RX ORDER — SODIUM CHLORIDE 9 MG/ML
1000 INJECTION INTRAMUSCULAR; INTRAVENOUS; SUBCUTANEOUS
Refills: 0 | Status: DISCONTINUED | OUTPATIENT
Start: 2019-12-06 | End: 2019-12-10

## 2019-12-06 RX ADMIN — VALACYCLOVIR 1000 MILLIGRAM(S): 500 TABLET, FILM COATED ORAL at 06:13

## 2019-12-06 RX ADMIN — SENNA PLUS 2 TABLET(S): 8.6 TABLET ORAL at 22:37

## 2019-12-06 RX ADMIN — Medication 250 MILLIGRAM(S): at 18:14

## 2019-12-06 RX ADMIN — CHLORHEXIDINE GLUCONATE 1 APPLICATION(S): 213 SOLUTION TOPICAL at 13:04

## 2019-12-06 RX ADMIN — PIPERACILLIN AND TAZOBACTAM 200 GRAM(S): 4; .5 INJECTION, POWDER, LYOPHILIZED, FOR SOLUTION INTRAVENOUS at 07:34

## 2019-12-06 RX ADMIN — INSULIN GLARGINE 16 UNIT(S): 100 INJECTION, SOLUTION SUBCUTANEOUS at 22:39

## 2019-12-06 RX ADMIN — Medication 500 MILLIGRAM(S): at 18:10

## 2019-12-06 RX ADMIN — VALACYCLOVIR 1000 MILLIGRAM(S): 500 TABLET, FILM COATED ORAL at 18:10

## 2019-12-06 RX ADMIN — PIPERACILLIN AND TAZOBACTAM 200 GRAM(S): 4; .5 INJECTION, POWDER, LYOPHILIZED, FOR SOLUTION INTRAVENOUS at 16:44

## 2019-12-06 RX ADMIN — Medication 40 MILLIEQUIVALENT(S): at 09:15

## 2019-12-06 RX ADMIN — SODIUM CHLORIDE 70 MILLILITER(S): 9 INJECTION INTRAMUSCULAR; INTRAVENOUS; SUBCUTANEOUS at 00:01

## 2019-12-06 RX ADMIN — Medication 6: at 23:59

## 2019-12-06 RX ADMIN — Medication 1 TABLET(S): at 18:10

## 2019-12-06 RX ADMIN — Medication 250 MILLIGRAM(S): at 06:13

## 2019-12-06 RX ADMIN — ZINC SULFATE TAB 220 MG (50 MG ZINC EQUIVALENT) 220 MILLIGRAM(S): 220 (50 ZN) TAB at 18:10

## 2019-12-06 RX ADMIN — PIPERACILLIN AND TAZOBACTAM 200 GRAM(S): 4; .5 INJECTION, POWDER, LYOPHILIZED, FOR SOLUTION INTRAVENOUS at 01:47

## 2019-12-06 RX ADMIN — PIPERACILLIN AND TAZOBACTAM 200 GRAM(S): 4; .5 INJECTION, POWDER, LYOPHILIZED, FOR SOLUTION INTRAVENOUS at 22:37

## 2019-12-06 RX ADMIN — VALACYCLOVIR 1000 MILLIGRAM(S): 500 TABLET, FILM COATED ORAL at 22:37

## 2019-12-06 NOTE — PROGRESS NOTE ADULT - PROBLEM SELECTOR PLAN 1
Pt with cough productive of yellow sputum, spiked temperature 100.9 on 12/4, AM WBC 10.71.   - f/u AM CXR  - f/u blood cx  - Continue vanc/zosyn  - f/u AM vanc trough Pt with cough productive of yellow sputum, spiked temperature 100.9 on 12/4, AM WBC 10.71. CXR with RLL pneumonia consistent with aspiration.  - f/u AM CXR  - Continue vanc/zosyn  - f/u AM vanc trough  - NPO with tube feeds

## 2019-12-06 NOTE — CHART NOTE - NSCHARTNOTEFT_GEN_A_CORE
Admitting Diagnosis:   Patient is a 89y old  Male who presents with a chief complaint of traumatic SAH found down (06 Dec 2019 01:28)      PAST MEDICAL & SURGICAL HISTORY:  DM (diabetes mellitus)  H/O hernia repair      Current Nutrition Order:   Diet, NPO with Tube Feed:   Tube Feeding Modality: Nasogastric  Glucerna 1.5 Robin (GLUCERNA1.5)  Total Volume for 24 Hours (mL): 1320  Total Number of Cans: 6  Continuous  Starting Tube Feed Rate {mL per Hour}: 20  Increase Tube Feed Rate by (mL): 20     Every 2 hours  Until Goal Tube Feed Rate (mL per Hour): 55  Tube Feed Duration (in Hours): 24  Tube Feed Start Time: 13:40 (12-05-19 @ 13:34)    Diet, NPO after Midnight:      NPO Start Date: 05-Dec-2019,   NPO Start Time: 23:59  Except Medications (12-05-19 @ 16:41)        GI Issues: BM 12/6 x1     Pain: none per flow sheets     Skin Integrity: SX incision to Right head frontal region / Skin tears: R wrist, R elbow, medical back, L top of Hand / R first toe venous ulcer / Pressure ulcers: Unstageable sacral area, Stage II BL Buttocks     Labs:   12-06    136  |  101  |  21  ----------------------------<  183<H>  3.6   |  25  |  0.47<L>    Ca    8.5      06 Dec 2019 05:08  Phos  3.2     12-06  Mg     2.0     12-06      CAPILLARY BLOOD GLUCOSE      POCT Blood Glucose.: 144 mg/dL (06 Dec 2019 07:07)  POCT Blood Glucose.: 233 mg/dL (05 Dec 2019 22:21)  POCT Blood Glucose.: 244 mg/dL (05 Dec 2019 16:30)  POCT Blood Glucose.: 146 mg/dL (05 Dec 2019 12:35)      Medications:  MEDICATIONS  (STANDING):  bisacodyl 5 milliGRAM(s) Oral at bedtime  chlorhexidine 2% Cloths 1 Application(s) Topical <User Schedule>  dextrose 5%. 1000 milliLiter(s) (50 mL/Hr) IV Continuous <Continuous>  dextrose 50% Injectable 12.5 Gram(s) IV Push once  insulin glargine Injectable (LANTUS) 16 Unit(s) SubCutaneous at bedtime  insulin lispro (HumaLOG) corrective regimen sliding scale   SubCutaneous every 6 hours  piperacillin/tazobactam IVPB.. 4.5 Gram(s) IV Intermittent every 6 hours  senna 2 Tablet(s) Oral at bedtime  sodium chloride 0.9%. 1000 milliLiter(s) (100 mL/Hr) IV Continuous <Continuous>  valACYclovir 1000 milliGRAM(s) Oral every 8 hours  vancomycin  IVPB      vancomycin  IVPB 1000 milliGRAM(s) IV Intermittent every 12 hours    MEDICATIONS  (PRN):  acetaminophen    Suspension .. 650 milliGRAM(s) Oral every 6 hours PRN Temp greater or equal to 38C (100.4F), Mild Pain (1 - 3)  dextrose 40% Gel 15 Gram(s) Oral once PRN Blood Glucose LESS THAN 70 milliGRAM(s)/deciliter  glucagon  Injectable 1 milliGRAM(s) IntraMuscular once PRN Glucose LESS THAN 70 milligrams/deciliter  hydrALAZINE Injectable 10 milliGRAM(s) IV Push every 1 hour PRN SBP > 140MMHG HR< 65  labetalol Injectable 10 milliGRAM(s) IV Push every 1 hour PRN Systolic blood pressure > 140MMHG hR<65            Height: 5'10", IBW 166lbs+/-10% %XQL=146% BMI 20.66 -- using most recent EMTR wt   11/28 145.2 pounds  11/30 141 pounds  12/6 144.4 pounds  Overall wt stable within 1-4 pounds during admission    Estimated energy needs:   ABW (66kg) used for calculations as pt between % of IBW.   Nutrient needs based on [facility] standards of care for maintenance in older adults.   Needs adjusted for suspected severe protein calorie malnutrition and catabolic state  1650-1980kcal/day (25-30kcal/kg)  106-119g pro/day (1.6-1.8g pro/kg)  Fluids per team.    Subjective:   89yo M with known DM2 admitted s/p fall with traumatic SAH/SDH, mild TBI. CTH showed new small L occipital SDH 11/29. Noted with New aspiration pneumonitis vs PNA. NGT feeds were started on 11/2 - pt s/p various swallow evaluations/FEESS during admission, most recent swallow evaluation 12/4 - Rec Continue NPO w/ NGT, allow 2-3 ice chips per hour as tolerated.  Pt NPO for pending PEG placement, prior TF orders for Glucerna 1.5 goal rate 55ml/hr x24hrs (1320ml, 1980kcal, 107gm prot, 1001ml water). Appears to be have tolerating per prior RN flow sheets.     Previous Nutrition Diagnosis:  Severe protein calorie malnutrition RT unable to meet increased needs AEB hypermetabolic state/wounds  Active [  x ]  Resolved [   ]  Increased nutrient needs RT increased demand for kcal/pro AEB multiple pressure ulcers and visible muscle wasting   Active [  x ]  Resolved [   ]    NEW PES: Inadequate energy intake RT intake<EER AEB TF on hold for PEG  Goal:  Diet to be advanced in 24-48hrs as medically feasible to allow Pt to consistently meet % of estimated needs via most appropriate route and avoid any further s/s malnutrition    Recommendations:  1. When feeds resumed, Recommend to Continue with current EN regimen - meeting EER (Glucerna 1.5 goal rate 55ml/hr x24hrs provides 1320ml, 1980kcal, 107gm prot, 1001ml water).  2. Monitor for s/s intolerance. Maintain aspiration precautions at all times. Monitor GI distress.   3. Recommend MVI, Vit C 500mg per day and Zinc Sulfate 220mg x10 days use to promote wound healing  4. Monitor Labs, wts, Skin, GOC.  5. RD to remain available for additional nutrition interventions as needed.     Education:   N/A    Risk Level: High [ x  ] Moderate [   ] Low [   ]. Admitting Diagnosis:   Patient is a 89y old  Male who presents with a chief complaint of traumatic SAH found down (06 Dec 2019 01:28)      PAST MEDICAL & SURGICAL HISTORY:  DM (diabetes mellitus)  H/O hernia repair      Current Nutrition Order:   Diet, NPO with Tube Feed:   Tube Feeding Modality: Nasogastric  Glucerna 1.5 Robin (GLUCERNA1.5)  Total Volume for 24 Hours (mL): 1320  Total Number of Cans: 6  Continuous  Starting Tube Feed Rate {mL per Hour}: 20  Increase Tube Feed Rate by (mL): 20     Every 2 hours  Until Goal Tube Feed Rate (mL per Hour): 55  Tube Feed Duration (in Hours): 24  Tube Feed Start Time: 13:40 (12-05-19 @ 13:34)    Diet, NPO after Midnight:      NPO Start Date: 05-Dec-2019,   NPO Start Time: 23:59  Except Medications (12-05-19 @ 16:41)        GI Issues: BM 12/6 x1     Pain: none per flow sheets     Skin Integrity: SX incision to Right head frontal region / Skin tears: R wrist, R elbow, medical back, L top of Hand / R first toe venous ulcer / Pressure ulcers: Unstageable sacral area, Stage II BL Buttocks     Labs:   12-06    136  |  101  |  21  ----------------------------<  183<H>  3.6   |  25  |  0.47<L>    Ca    8.5      06 Dec 2019 05:08  Phos  3.2     12-06  Mg     2.0     12-06      CAPILLARY BLOOD GLUCOSE      POCT Blood Glucose.: 144 mg/dL (06 Dec 2019 07:07)  POCT Blood Glucose.: 233 mg/dL (05 Dec 2019 22:21)  POCT Blood Glucose.: 244 mg/dL (05 Dec 2019 16:30)  POCT Blood Glucose.: 146 mg/dL (05 Dec 2019 12:35)      Medications:  MEDICATIONS  (STANDING):  bisacodyl 5 milliGRAM(s) Oral at bedtime  chlorhexidine 2% Cloths 1 Application(s) Topical <User Schedule>  dextrose 5%. 1000 milliLiter(s) (50 mL/Hr) IV Continuous <Continuous>  dextrose 50% Injectable 12.5 Gram(s) IV Push once  insulin glargine Injectable (LANTUS) 16 Unit(s) SubCutaneous at bedtime  insulin lispro (HumaLOG) corrective regimen sliding scale   SubCutaneous every 6 hours  piperacillin/tazobactam IVPB.. 4.5 Gram(s) IV Intermittent every 6 hours  senna 2 Tablet(s) Oral at bedtime  sodium chloride 0.9%. 1000 milliLiter(s) (100 mL/Hr) IV Continuous <Continuous>  valACYclovir 1000 milliGRAM(s) Oral every 8 hours  vancomycin  IVPB      vancomycin  IVPB 1000 milliGRAM(s) IV Intermittent every 12 hours    MEDICATIONS  (PRN):  acetaminophen    Suspension .. 650 milliGRAM(s) Oral every 6 hours PRN Temp greater or equal to 38C (100.4F), Mild Pain (1 - 3)  dextrose 40% Gel 15 Gram(s) Oral once PRN Blood Glucose LESS THAN 70 milliGRAM(s)/deciliter  glucagon  Injectable 1 milliGRAM(s) IntraMuscular once PRN Glucose LESS THAN 70 milligrams/deciliter  hydrALAZINE Injectable 10 milliGRAM(s) IV Push every 1 hour PRN SBP > 140MMHG HR< 65  labetalol Injectable 10 milliGRAM(s) IV Push every 1 hour PRN Systolic blood pressure > 140MMHG hR<65            Height: 5'10", IBW 166lbs+/-10% %GFQ=365% BMI 20.66 -- using most recent EMTR wt   11/28 145.2 pounds  11/30 141 pounds  12/6 144.4 pounds  Overall wt stable within 1-4 pounds during admission    Estimated energy needs:   ABW (66kg) used for calculations as pt between % of IBW.   Nutrient needs based on [facility] standards of care for maintenance in older adults.   Needs adjusted for suspected severe protein calorie malnutrition and catabolic state  1650-1980kcal/day (25-30kcal/kg)  106-119g pro/day (1.6-1.8g pro/kg)  Fluids per team.    Subjective:   89yo M with known DM2 admitted s/p fall with traumatic SAH/SDH, mild TBI. CTH showed new small L occipital SDH 11/29. Noted with New aspiration pneumonitis vs PNA. NGT feeds were started on 11/2 - pt s/p various swallow evaluations/FEESS during admission, most recent swallow evaluation 12/4 - Rec Continue NPO w/ NGT, allow 2-3 ice chips per hour as tolerated.  Pt NPO for pending PEG placement, prior TF orders for Glucerna 1.5 goal rate 55ml/hr x24hrs (1320ml, 1980kcal, 107gm prot, 1001ml water). Spoke with RN who reports feeds held since midnight, unsure of tolerance prior to holding, however Appears to be have tolerating per prior RN flow sheets.     Previous Nutrition Diagnosis:  Severe protein calorie malnutrition RT unable to meet increased needs AEB hypermetabolic state/wounds  Active [  x ]  Resolved [   ]  Increased nutrient needs RT increased demand for kcal/pro AEB multiple pressure ulcers and visible muscle wasting   Active [  x ]  Resolved [   ]    NEW PES: Inadequate energy intake RT intake<EER AEB TF on hold for PEG  Goal:  Diet to be advanced in 24-48hrs as medically feasible to allow Pt to consistently meet % of estimated needs via most appropriate route and avoid any further s/s malnutrition    Recommendations:  1. When feeds resumed, Recommend to Continue with current EN regimen - meeting EER (Glucerna 1.5 goal rate 55ml/hr x24hrs provides 1320ml, 1980kcal, 107gm prot, 1001ml water).  2. Monitor for s/s intolerance. Maintain aspiration precautions at all times. Monitor GI distress.   3. Recommend MVI, Vit C 500mg per day and Zinc Sulfate 220mg x10 days use to promote wound healing  4. Monitor Labs, wts, Skin, GOC.  5. RD to remain available for additional nutrition interventions as needed.     Education:   N/A    Risk Level: High [ x  ] Moderate [   ] Low [   ]. Admitting Diagnosis:   Patient is a 89y old  Male who presents with a chief complaint of traumatic SAH found down (06 Dec 2019 01:28)      PAST MEDICAL & SURGICAL HISTORY:  DM (diabetes mellitus)  H/O hernia repair      Current Nutrition Order:   Diet, NPO with Tube Feed:   Tube Feeding Modality: Nasogastric  Glucerna 1.5 Robin (GLUCERNA1.5)  Total Volume for 24 Hours (mL): 1320  Total Number of Cans: 6  Continuous  Starting Tube Feed Rate {mL per Hour}: 20  Increase Tube Feed Rate by (mL): 20     Every 2 hours  Until Goal Tube Feed Rate (mL per Hour): 55  Tube Feed Duration (in Hours): 24  Tube Feed Start Time: 13:40 (12-05-19 @ 13:34)    Diet, NPO after Midnight:      NPO Start Date: 05-Dec-2019,   NPO Start Time: 23:59  Except Medications (12-05-19 @ 16:41)        GI Issues: BM 12/6 x1     Pain: none per flow sheets     Skin Integrity: SX incision to Right head frontal region / Skin tears: R wrist, R elbow, medical back, L top of Hand / R first toe venous ulcer / Pressure ulcers: Unstageable sacral area, Stage II BL Buttocks     Labs:   12-06    136  |  101  |  21  ----------------------------<  183<H>  3.6   |  25  |  0.47<L>    Ca    8.5      06 Dec 2019 05:08  Phos  3.2     12-06  Mg     2.0     12-06      CAPILLARY BLOOD GLUCOSE      POCT Blood Glucose.: 144 mg/dL (06 Dec 2019 07:07)  POCT Blood Glucose.: 233 mg/dL (05 Dec 2019 22:21)  POCT Blood Glucose.: 244 mg/dL (05 Dec 2019 16:30)  POCT Blood Glucose.: 146 mg/dL (05 Dec 2019 12:35)      Medications:  MEDICATIONS  (STANDING):  bisacodyl 5 milliGRAM(s) Oral at bedtime  chlorhexidine 2% Cloths 1 Application(s) Topical <User Schedule>  dextrose 5%. 1000 milliLiter(s) (50 mL/Hr) IV Continuous <Continuous>  dextrose 50% Injectable 12.5 Gram(s) IV Push once  insulin glargine Injectable (LANTUS) 16 Unit(s) SubCutaneous at bedtime  insulin lispro (HumaLOG) corrective regimen sliding scale   SubCutaneous every 6 hours  piperacillin/tazobactam IVPB.. 4.5 Gram(s) IV Intermittent every 6 hours  senna 2 Tablet(s) Oral at bedtime  sodium chloride 0.9%. 1000 milliLiter(s) (100 mL/Hr) IV Continuous <Continuous>  valACYclovir 1000 milliGRAM(s) Oral every 8 hours  vancomycin  IVPB      vancomycin  IVPB 1000 milliGRAM(s) IV Intermittent every 12 hours    MEDICATIONS  (PRN):  acetaminophen    Suspension .. 650 milliGRAM(s) Oral every 6 hours PRN Temp greater or equal to 38C (100.4F), Mild Pain (1 - 3)  dextrose 40% Gel 15 Gram(s) Oral once PRN Blood Glucose LESS THAN 70 milliGRAM(s)/deciliter  glucagon  Injectable 1 milliGRAM(s) IntraMuscular once PRN Glucose LESS THAN 70 milligrams/deciliter  hydrALAZINE Injectable 10 milliGRAM(s) IV Push every 1 hour PRN SBP > 140MMHG HR< 65  labetalol Injectable 10 milliGRAM(s) IV Push every 1 hour PRN Systolic blood pressure > 140MMHG hR<65            Height: 5'10", IBW 166lbs+/-10% %VUE=763% BMI 20.66 -- using most recent EMTR wt   11/28 145.2 pounds  11/30 141 pounds  12/6 144.4 pounds  Overall wt stable within 1-4 pounds during admission    Estimated energy needs:   ABW (66kg) used for calculations as pt between % of IBW.   Nutrient needs based on [facility] standards of care for maintenance in older adults.   Needs adjusted for suspected severe protein calorie malnutrition and catabolic state  1650-1980kcal/day (25-30kcal/kg)  106-119g pro/day (1.6-1.8g pro/kg)  Fluids per team.    Subjective:   89yo M with known DM2 admitted s/p fall with traumatic SAH/SDH, mild TBI. CTH showed new small L occipital SDH 11/29. Noted with New aspiration pneumonitis vs PNA. NGT feeds were started on 11/2 - pt s/p various swallow evaluations/FEESS during admission, most recent swallow evaluation 12/4 - Rec Continue NPO w/ NGT, allow 2-3 ice chips per hour as tolerated.  Pt NPO for pending PEG placement, prior TF orders for Glucerna 1.5 goal rate 55ml/hr x24hrs (1320ml, 1980kcal, 107gm prot, 1001ml water). Spoke with RN who reports feeds held since midnight, unsure of tolerance prior to holding, however Appears to be have tolerating per prior RN flow sheets.   Please see below for nutritions recommendations. Recs made with team.     Previous Nutrition Diagnosis:  Severe protein calorie malnutrition RT unable to meet increased needs AEB hypermetabolic state/wounds  Active [  x ]  Resolved [   ]  Increased nutrient needs RT increased demand for kcal/pro AEB multiple pressure ulcers and visible muscle wasting   Active [  x ]  Resolved [   ]    NEW PES: Inadequate energy intake RT intake<EER AEB TF on hold for PEG  Goal:  Diet to be advanced in 24-48hrs as medically feasible to allow Pt to consistently meet % of estimated needs via most appropriate route and avoid any further s/s malnutrition    Recommendations:  1. When feeds resumed, Recommend to Continue with current EN regimen - meeting EER (Glucerna 1.5 goal rate 55ml/hr x24hrs provides 1320ml, 1980kcal, 107gm prot, 1001ml water).  2. Monitor for s/s intolerance. Maintain aspiration precautions at all times. Monitor GI distress.   3. Recommend MVI, Vit C 500mg per day and Zinc Sulfate 220mg x10 days use to promote wound healing  4. Monitor Labs, wts, Skin, GOC.  5. RD to remain available for additional nutrition interventions as needed.     Education:   N/A    Risk Level: High [ x  ] Moderate [   ] Low [   ].

## 2019-12-06 NOTE — PROGRESS NOTE ADULT - SUBJECTIVE AND OBJECTIVE BOX
HPI:  89 yo male pmhx DM  BIBEMS after being found down for unknown period of time by doorman who called ambulance for AMS. On admission patient noted to have scalp lacerations and wrist abrasion, CTH c/w small traumatic SAH with small SDH component. Patient is a poor historian, unable to provide ROS. Denies any pain. No family with patient. Minimal collateral EMR information. (28 Nov 2019 17:46)      Hospital course:   11/28: HD # 0: admitted to ICU from ED for traumatic SAH/SDH. rpt imaging stable  11/29: HD #1: rpt CTH overnight shows new small left occipital SDH, repeated again this am for stability, which shows no increased bleeding. Exam unchanged, remains aphasic and AVILES. EEG placed to r/o seizure. NGT feeds started   11/30: HD# 2: hypotensive o/n, does not appear to be hemodynamically unstable, bedside echo performed, ruled out cardiac dysfunction / pericardial effusion / major valvular dysfunction, formal echo ordered. EEG removed after official read was negative, will remain on keppra.  MRI today.   12/01: Fails attempt to perform MRI yesterday due to agitation and hypotension. We were able to localize family yesterday.  12/2: UVALDO o/n, neuro exam improves  12/3: UVALDO overnight, neuro stable. Patient has malnutrition as per nutritionist evaluation.   12/4: Delirium overnight, on wrist restraint. Period of tachycardia improved with IV Bolus. Pending S&S re-evaluation for PO diet.  12/5: Failed speech and swallow eval. UVALDO overnight. Difficulty arousing patient, transferred back to ICU. CTH stable. Copious secretions. CT abdomen/chest: R>L consolidation, R small pleural effusion, R middle lobe pulmonary nodule. Started on vanc/zosyn for pneumonia.    12/6: Pt on high flow O2 overnight for low O2 sats. sBP in 80s with IVF 70cc/hr, increased to 100cc/hr and given 500cc bolus. NPO for PEG placement w/ IR today. Earl consulted for recommendations.     Vital Signs Last 24 Hrs  T(C): 36.7 (06 Dec 2019 01:27), Max: 38.3 (05 Dec 2019 17:00)  T(F): 98.1 (06 Dec 2019 01:27), Max: 100.9 (05 Dec 2019 17:00)  HR: 102 (06 Dec 2019 01:00) (92 - 126)  BP: 82/48 (06 Dec 2019 01:00) (82/48 - 147/75)  BP(mean): 60 (06 Dec 2019 01:00) (56 - 111)  RR: 28 (06 Dec 2019 01:00) (17 - 36)  SpO2: 95% (06 Dec 2019 01:00) (91% - 97%)    I&O's Summary    04 Dec 2019 07:01  -  05 Dec 2019 07:00  --------------------------------------------------------  IN: 1475 mL / OUT: 1350 mL / NET: 125 mL    05 Dec 2019 07:01  -  06 Dec 2019 01:31  --------------------------------------------------------  IN: 2269 mL / OUT: 601 mL / NET: 1668 mL        PHYSICAL EXAM:  Gen: OE spontaneously, NAD, oriented x2  HEENT: PERRL, EOMI   Neck: supple  Lungs: unlabored breathing on high flow nasal cannula   Heart: regular rate   Abd: soft, non-tender, non-distended, normoactive +BS  Exts: no edema, 2+ pulses throughout  Neuro: follows simple commands b/l, CN intact, face symmetric, tongue midline  MAEx4 equally 5/5, SILT       TUBES/LINES:  [] Reyna  [] A-line  [] Lumbar Drain  [] Wound Drains  [x] NGT   [] EVD   [] CVC  [] Other      DIET:  [x] NPO  [] Mechanical  [] Tube feeds    LABS:                        11.3   11.07 )-----------( 160      ( 05 Dec 2019 13:44 )             35.6     12-05    138  |  102  |  19  ----------------------------<  180<H>  4.3   |  25  |  0.43<L>    Ca    8.9      05 Dec 2019 13:44  Phos  2.3     12-04  Mg     2.1     12-04              CAPILLARY BLOOD GLUCOSE      POCT Blood Glucose.: 233 mg/dL (05 Dec 2019 22:21)  POCT Blood Glucose.: 244 mg/dL (05 Dec 2019 16:30)  POCT Blood Glucose.: 146 mg/dL (05 Dec 2019 12:35)  POCT Blood Glucose.: 95 mg/dL (05 Dec 2019 10:19)  POCT Blood Glucose.: 67 mg/dL (05 Dec 2019 06:18)      Drug Levels: [] N/A    CSF Analysis: [] N/A      Allergies    No Known Allergies    Intolerances        Home Medications:  insulin:  (11 Oct 2017 06:34)      MEDICATIONS:  MEDICATIONS  (STANDING):  bisacodyl 5 milliGRAM(s) Oral at bedtime  dextrose 5%. 1000 milliLiter(s) (50 mL/Hr) IV Continuous <Continuous>  dextrose 50% Injectable 12.5 Gram(s) IV Push once  insulin glargine Injectable (LANTUS) 16 Unit(s) SubCutaneous at bedtime  insulin lispro (HumaLOG) corrective regimen sliding scale   SubCutaneous Before meals and at bedtime  piperacillin/tazobactam IVPB.. 4.5 Gram(s) IV Intermittent every 6 hours  senna 2 Tablet(s) Oral at bedtime  sodium chloride 0.9%. 1000 milliLiter(s) (100 mL/Hr) IV Continuous <Continuous>  valACYclovir 1000 milliGRAM(s) Oral every 8 hours  vancomycin  IVPB      vancomycin  IVPB 1000 milliGRAM(s) IV Intermittent every 12 hours    MEDICATIONS  (PRN):  acetaminophen    Suspension .. 650 milliGRAM(s) Oral every 6 hours PRN Temp greater or equal to 38C (100.4F), Mild Pain (1 - 3)  dextrose 40% Gel 15 Gram(s) Oral once PRN Blood Glucose LESS THAN 70 milliGRAM(s)/deciliter  glucagon  Injectable 1 milliGRAM(s) IntraMuscular once PRN Glucose LESS THAN 70 milligrams/deciliter  hydrALAZINE Injectable 10 milliGRAM(s) IV Push every 1 hour PRN SBP > 140MMHG HR< 65  labetalol Injectable 10 milliGRAM(s) IV Push every 1 hour PRN Systolic blood pressure > 140MMHG hR<65      CULTURES:  Culture Results:   No growth at 5 days. (11-28 @ 19:44)  Culture Results:   No growth at 5 days. (11-28 @ 19:44)      RADIOLOGY & ADDITIONAL TESTS:      ASSESSMENT:  89y Male with known DM II s/p fall with traumatic SAH/SDH, now resolved, return to ICU on 12/5 for lethargy and pneumonia, pending PEG placement today.    PLAN:  NEURO:   - cont neuro checks/ vital checks  - pain meds PRN  - off keppra  - cont wrist restraint for delirium and prevention of NGT removal   - CTH performed yesterday - reviewed with Dr. Morin and stable    CARDIOVASCULAR:   - normotensive BP goals  - daily labs, electrolyte repletion PRN     PULMONARY:   - Cont pneumonia tx   - continue suctioning for secretions  - monitor for desaturation   - NC vs high flow O2 prn  - chest PT  - incentive spirometry  - F/u Dr. Earl tiwari re: pneumonia and pulmonary nodule     RENAL:   - NS @100 while NPO for PEG today     GI:   - NPO for PEG placement in IR today   - CT abd performed yest: negative for acute pathology   - Bowel regimen      ID:   - afebrile, pan culture next fever spike   - Cont vanc / zosyn for pneumonia  - Vanc trough 12/7 at 5am   - Cont valtrex for herpetic sacral rash     ENDO:   - cont ISS  - Lantus 16 QHS     DVT PROPHYLAXIS: SCDs, SQL held for PEG today     DISPOSITION: ICU status, full code, pending dispo    Above d/w Dr. Morin and Dr. Trinidad    Assessment: present when checked     [] GCS   E   V   M     Heart Failure: [] Acute, [] acute on chronic, [] chronic   Heart Failure: [] Diastolic (HFpEF), [] Systolic (HRrEF), [] Combined (HFpEF and HFrEF), [] RHF, [] Pulm HTN, [] Other     [] REY, [] ATN, [] AIN, [] other   [] CKD1, [] CKD2, [] CKD3, [] CKD4, [] CKD5, [] ESRD     Encephalopathy: [] Metabolic, [] Hepatic, [] Toxic, [] Neurological, [] Other     Abnormal Nutritional Status: [] malnutrition (see nutrition note), []underweight: BMI <19, [] morbid obesity: BMI >40, [] Cachexia     [] Sepsis   [] Hypovolemic shock, [] Cardiogenic shock, [] Hemorrhagic shock, [] Neurogenic shock   [] Acute respiratory failure   [] Cerebral edema, [] Brain compression / herniation   [] Functional quadriplegia   [] Acute blood loss anemia HPI:  88 yo male pmhx DM  BIBEMS after being found down for unknown period of time by doorman who called ambulance for AMS. On admission patient noted to have scalp lacerations and wrist abrasion, CTH c/w small traumatic SAH with small SDH component. Patient is a poor historian, unable to provide ROS. Denies any pain. No family with patient. Minimal collateral EMR information. (28 Nov 2019 17:46)      Hospital course:   11/28: HD # 0: admitted to ICU from ED for traumatic SAH/SDH. rpt imaging stable  11/29: HD #1: rpt CTH overnight shows new small left occipital SDH, repeated again this am for stability, which shows no increased bleeding. Exam unchanged, remains aphasic and AVILES. EEG placed to r/o seizure. NGT feeds started   11/30: HD# 2: hypotensive o/n, does not appear to be hemodynamically unstable, bedside echo performed, ruled out cardiac dysfunction / pericardial effusion / major valvular dysfunction, formal echo ordered. EEG removed after official read was negative, will remain on keppra.  MRI today.   12/01: Fails attempt to perform MRI yesterday due to agitation and hypotension. We were able to localize family yesterday.  12/2: UVALDO o/n, neuro exam improves  12/3: UVALDO overnight, neuro stable. Patient has malnutrition as per nutritionist evaluation.   12/4: Delirium overnight, on wrist restraint. Period of tachycardia improved with IV Bolus. Pending S&S re-evaluation for PO diet.  12/5: Failed speech and swallow eval. UVALDO overnight. Difficulty arousing patient, transferred back to ICU. CTH stable. Copious secretions. CT abdomen/chest: R>L consolidation, R small pleural effusion, R middle lobe pulmonary nodule. Started on vanc/zosyn for pneumonia.    12/6: Pt on high flow O2 overnight for low O2 sats. sBP in 80s with IVF 70cc/hr, increased to 100cc/hr and given 500cc bolus. NPO for PEG placement w/ IR today. Earl consulted for recommendations.     Vital Signs Last 24 Hrs  T(C): 36.7 (06 Dec 2019 01:27), Max: 38.3 (05 Dec 2019 17:00)  T(F): 98.1 (06 Dec 2019 01:27), Max: 100.9 (05 Dec 2019 17:00)  HR: 102 (06 Dec 2019 01:00) (92 - 126)  BP: 82/48 (06 Dec 2019 01:00) (82/48 - 147/75)  BP(mean): 60 (06 Dec 2019 01:00) (56 - 111)  RR: 28 (06 Dec 2019 01:00) (17 - 36)  SpO2: 95% (06 Dec 2019 01:00) (91% - 97%)    I&O's Summary    04 Dec 2019 07:01  -  05 Dec 2019 07:00  --------------------------------------------------------  IN: 1475 mL / OUT: 1350 mL / NET: 125 mL    05 Dec 2019 07:01  -  06 Dec 2019 01:31  --------------------------------------------------------  IN: 2269 mL / OUT: 601 mL / NET: 1668 mL        PHYSICAL EXAM:  Gen: OE spontaneously, NAD, oriented x2  HEENT: PERRL, EOMI   Neck: supple  Lungs: unlabored breathing on high flow nasal cannula   Heart: regular rate   Abd: soft, non-tender, non-distended, normoactive +BS  Exts: no edema, 2+ pulses throughout  Neuro: follows simple commands b/l, CN intact, face symmetric, tongue midline  MAEx4 equally 5/5, SILT       TUBES/LINES:  [] Reyna  [] A-line  [] Lumbar Drain  [] Wound Drains  [x] NGT   [] EVD   [] CVC  [] Other      DIET:  [x] NPO  [] Mechanical  [] Tube feeds    LABS:                                 9.8    10.71 )-----------( 166      ( 06 Dec 2019 05:08 )             30.2   12-06    136  |  101  |  21  ----------------------------<  183<H>  3.6   |  25  |  0.47<L>    Ca    8.5      06 Dec 2019 05:08  Phos  3.2     12-06  Mg     2.0     12-06                CAPILLARY BLOOD GLUCOSE      POCT Blood Glucose.: 233 mg/dL (05 Dec 2019 22:21)  POCT Blood Glucose.: 244 mg/dL (05 Dec 2019 16:30)  POCT Blood Glucose.: 146 mg/dL (05 Dec 2019 12:35)  POCT Blood Glucose.: 95 mg/dL (05 Dec 2019 10:19)  POCT Blood Glucose.: 67 mg/dL (05 Dec 2019 06:18)      Drug Levels: [] N/A    CSF Analysis: [] N/A      Allergies    No Known Allergies    Intolerances        Home Medications:  insulin:  (11 Oct 2017 06:34)      MEDICATIONS:  MEDICATIONS  (STANDING):  bisacodyl 5 milliGRAM(s) Oral at bedtime  dextrose 5%. 1000 milliLiter(s) (50 mL/Hr) IV Continuous <Continuous>  dextrose 50% Injectable 12.5 Gram(s) IV Push once  insulin glargine Injectable (LANTUS) 16 Unit(s) SubCutaneous at bedtime  insulin lispro (HumaLOG) corrective regimen sliding scale   SubCutaneous Before meals and at bedtime  piperacillin/tazobactam IVPB.. 4.5 Gram(s) IV Intermittent every 6 hours  senna 2 Tablet(s) Oral at bedtime  sodium chloride 0.9%. 1000 milliLiter(s) (100 mL/Hr) IV Continuous <Continuous>  valACYclovir 1000 milliGRAM(s) Oral every 8 hours  vancomycin  IVPB      vancomycin  IVPB 1000 milliGRAM(s) IV Intermittent every 12 hours    MEDICATIONS  (PRN):  acetaminophen    Suspension .. 650 milliGRAM(s) Oral every 6 hours PRN Temp greater or equal to 38C (100.4F), Mild Pain (1 - 3)  dextrose 40% Gel 15 Gram(s) Oral once PRN Blood Glucose LESS THAN 70 milliGRAM(s)/deciliter  glucagon  Injectable 1 milliGRAM(s) IntraMuscular once PRN Glucose LESS THAN 70 milligrams/deciliter  hydrALAZINE Injectable 10 milliGRAM(s) IV Push every 1 hour PRN SBP > 140MMHG HR< 65  labetalol Injectable 10 milliGRAM(s) IV Push every 1 hour PRN Systolic blood pressure > 140MMHG hR<65      CULTURES:  Culture Results:   No growth at 5 days. (11-28 @ 19:44)  Culture Results:   No growth at 5 days. (11-28 @ 19:44)      RADIOLOGY & ADDITIONAL TESTS:      ASSESSMENT:  89y Male with known DM II s/p fall with traumatic SAH/SDH, now resolved, return to ICU on 12/5 for lethargy and pneumonia, pending PEG placement today.    PLAN:  NEURO:   - cont neuro checks/ vital checks  - pain meds PRN  - off keppra  - cont wrist restraint for delirium and prevention of NGT removal   - CTH today    CARDIOVASCULAR:   - normotensive BP goals  - daily labs, electrolyte repletion PRN     PULMONARY:   - Cont pneumonia tx   - continue suctioning for secretions  - monitor for desaturation   - NC vs high flow O2 prn  - chest PT  - incentive spirometry  - F/u Dr. Earl tiwari re: pneumonia and pulmonary nodule     RENAL:   - NS @100 while NPO for PEG today     GI:   - NPO for PEG placement in IR today   - CT abd performed yest: negative for acute pathology   - Bowel regimen      ID:   - afebrile, pan culture next fever spike   - Cont vanc / zosyn for pneumonia  - Vanc trough 12/7 at 5am   - Cont valtrex for herpetic sacral rash     ENDO:   - cont ISS  - Lantus 16 QHS     DVT PROPHYLAXIS: SCDs, SQL held for PEG today     DISPOSITION: ICU status, full code, pending dispo    Above d/w Dr. Morin and Dr. Trinidad    Assessment: present when checked     [] GCS   E   V   M     Heart Failure: [] Acute, [] acute on chronic, [] chronic   Heart Failure: [] Diastolic (HFpEF), [] Systolic (HRrEF), [] Combined (HFpEF and HFrEF), [] RHF, [] Pulm HTN, [] Other     [] REY, [] ATN, [] AIN, [] other   [] CKD1, [] CKD2, [] CKD3, [] CKD4, [] CKD5, [] ESRD     Encephalopathy: [] Metabolic, [] Hepatic, [] Toxic, [] Neurological, [] Other     Abnormal Nutritional Status: [] malnutrition (see nutrition note), []underweight: BMI <19, [] morbid obesity: BMI >40, [] Cachexia     [] Sepsis   [] Hypovolemic shock, [] Cardiogenic shock, [] Hemorrhagic shock, [] Neurogenic shock   [] Acute respiratory failure   [] Cerebral edema, [] Brain compression / herniation   [] Functional quadriplegia   [] Acute blood loss anemia HPI:  88 yo male pmhx DM  BIBEMS after being found down for unknown period of time by doorman who called ambulance for AMS. On admission patient noted to have scalp lacerations and wrist abrasion, CTH c/w small traumatic SAH with small SDH component. Patient is a poor historian, unable to provide ROS. Denies any pain. No family with patient. Minimal collateral EMR information. (28 Nov 2019 17:46)      Hospital course:   11/28: HD # 0: admitted to ICU from ED for traumatic SAH/SDH. rpt imaging stable  11/29: HD #1: rpt CTH overnight shows new small left occipital SDH, repeated again this am for stability, which shows no increased bleeding. Exam unchanged, remains aphasic and AVILES. EEG placed to r/o seizure. NGT feeds started   11/30: HD# 2: hypotensive o/n, does not appear to be hemodynamically unstable, bedside echo performed, ruled out cardiac dysfunction / pericardial effusion / major valvular dysfunction, formal echo ordered. EEG removed after official read was negative, will remain on keppra.  MRI today.   12/01: Fails attempt to perform MRI yesterday due to agitation and hypotension. We were able to localize family yesterday.  12/2: UVALDO o/n, neuro exam improves  12/3: UVALDO overnight, neuro stable. Patient has malnutrition as per nutritionist evaluation.   12/4: Delirium overnight, on wrist restraint. Period of tachycardia improved with IV Bolus. Pending S&S re-evaluation for PO diet.  12/5: Failed speech and swallow eval. UVALDO overnight. Difficulty arousing patient, transferred back to ICU. CTH stable. Copious secretions. CT abdomen/chest: R>L consolidation, R small pleural effusion, R middle lobe pulmonary nodule. Started on vanc/zosyn for pneumonia.    12/6: Pt on high flow O2 overnight for low O2 sats. sBP in 80s with IVF 70cc/hr, increased to 100cc/hr and given 500cc bolus. NPO for PEG placement w/ IR today. Earl consulted for recommendations.     Vital Signs Last 24 Hrs  T(C): 36.7 (06 Dec 2019 01:27), Max: 38.3 (05 Dec 2019 17:00)  T(F): 98.1 (06 Dec 2019 01:27), Max: 100.9 (05 Dec 2019 17:00)  HR: 102 (06 Dec 2019 01:00) (92 - 126)  BP: 82/48 (06 Dec 2019 01:00) (82/48 - 147/75)  BP(mean): 60 (06 Dec 2019 01:00) (56 - 111)  RR: 28 (06 Dec 2019 01:00) (17 - 36)  SpO2: 95% (06 Dec 2019 01:00) (91% - 97%)    I&O's Summary    04 Dec 2019 07:01  -  05 Dec 2019 07:00  --------------------------------------------------------  IN: 1475 mL / OUT: 1350 mL / NET: 125 mL    05 Dec 2019 07:01  -  06 Dec 2019 01:31  --------------------------------------------------------  IN: 2269 mL / OUT: 601 mL / NET: 1668 mL        PHYSICAL EXAM:  Gen: OE spontaneously, NAD, oriented x2  HEENT: PERRL, EOMI   Neck: supple  Lungs: unlabored breathing on high flow nasal cannula   Heart: regular rate   Abd: soft, non-tender, non-distended, normoactive +BS  Exts: no edema, 2+ pulses throughout  Neuro: follows simple commands b/l, CN intact, face symmetric, tongue midline  MAEx4 equally 5/5, SILT       TUBES/LINES:  [] Reyna  [] A-line  [] Lumbar Drain  [] Wound Drains  [x] NGT   [] EVD   [] CVC  [] Other      DIET:  [x] NPO  [] Mechanical  [] Tube feeds    LABS:                                 9.8    10.71 )-----------( 166      ( 06 Dec 2019 05:08 )             30.2   12-06    136  |  101  |  21  ----------------------------<  183<H>  3.6   |  25  |  0.47<L>    Ca    8.5      06 Dec 2019 05:08  Phos  3.2     12-06  Mg     2.0     12-06                CAPILLARY BLOOD GLUCOSE      POCT Blood Glucose.: 233 mg/dL (05 Dec 2019 22:21)  POCT Blood Glucose.: 244 mg/dL (05 Dec 2019 16:30)  POCT Blood Glucose.: 146 mg/dL (05 Dec 2019 12:35)  POCT Blood Glucose.: 95 mg/dL (05 Dec 2019 10:19)  POCT Blood Glucose.: 67 mg/dL (05 Dec 2019 06:18)      Drug Levels: [] N/A    CSF Analysis: [] N/A      Allergies    No Known Allergies    Intolerances        Home Medications:  insulin:  (11 Oct 2017 06:34)      MEDICATIONS:  MEDICATIONS  (STANDING):  bisacodyl 5 milliGRAM(s) Oral at bedtime  dextrose 5%. 1000 milliLiter(s) (50 mL/Hr) IV Continuous <Continuous>  dextrose 50% Injectable 12.5 Gram(s) IV Push once  insulin glargine Injectable (LANTUS) 16 Unit(s) SubCutaneous at bedtime  insulin lispro (HumaLOG) corrective regimen sliding scale   SubCutaneous Before meals and at bedtime  piperacillin/tazobactam IVPB.. 4.5 Gram(s) IV Intermittent every 6 hours  senna 2 Tablet(s) Oral at bedtime  sodium chloride 0.9%. 1000 milliLiter(s) (100 mL/Hr) IV Continuous <Continuous>  valACYclovir 1000 milliGRAM(s) Oral every 8 hours  vancomycin  IVPB      vancomycin  IVPB 1000 milliGRAM(s) IV Intermittent every 12 hours    MEDICATIONS  (PRN):  acetaminophen    Suspension .. 650 milliGRAM(s) Oral every 6 hours PRN Temp greater or equal to 38C (100.4F), Mild Pain (1 - 3)  dextrose 40% Gel 15 Gram(s) Oral once PRN Blood Glucose LESS THAN 70 milliGRAM(s)/deciliter  glucagon  Injectable 1 milliGRAM(s) IntraMuscular once PRN Glucose LESS THAN 70 milligrams/deciliter  hydrALAZINE Injectable 10 milliGRAM(s) IV Push every 1 hour PRN SBP > 140MMHG HR< 65  labetalol Injectable 10 milliGRAM(s) IV Push every 1 hour PRN Systolic blood pressure > 140MMHG hR<65      CULTURES:  Culture Results:   No growth at 5 days. (11-28 @ 19:44)  Culture Results:   No growth at 5 days. (11-28 @ 19:44)      RADIOLOGY & ADDITIONAL TESTS:      ASSESSMENT:  89y Male with known DM II s/p fall with traumatic SAH/SDH, now resolved, return to ICU on 12/5 for lethargy and pneumonia, pending PEG placement today.    PLAN:  NEURO:   - cont neuro checks/ vital checks  - pain meds PRN  - off keppra  - cont wrist restraint for delirium and prevention of NGT removal   - CTH today    CARDIOVASCULAR:   - normotensive BP goals  - daily labs, electrolyte repletion PRN     PULMONARY:   - Cont pneumonia tx   - continue suctioning for secretions  - monitor for desaturation   - NC vs high flow O2 prn  - chest PT  - incentive spirometry  - F/u Dr. Earl tiwari re: pneumonia and pulmonary nodule     RENAL:   - NS @100 while NPO for PEG today     GI:   - NPO for PEG placement in IR today   - CT abd performed yest: negative for acute pathology   - Bowel regimen      ID:   - afebrile, pan culture next fever spike   - Cont vanc / zosyn for pneumonia  - Vanc trough 12/7 at 5am   - Cont valtrex for herpetic sacral rash     ENDO:   - cont ISS  - Lantus 16 QHS     DVT PROPHYLAXIS: SCDs, SQL held for PEG today     DISPOSITION: ICU status, full code, pending dispo    Above d/w Dr. Morin and Dr. Trinidad    Assessment: present when checked     [] GCS   E   V   M     Heart Failure: [] Acute, [] acute on chronic, [] chronic   Heart Failure: [] Diastolic (HFpEF), [] Systolic (HRrEF), [] Combined (HFpEF and HFrEF), [] RHF, [] Pulm HTN, [] Other     [] REY, [] ATN, [] AIN, [] other   [] CKD1, [] CKD2, [] CKD3, [] CKD4, [] CKD5, [] ESRD     Encephalopathy: [] Metabolic, [] Hepatic, [] Toxic, [x] Neurological, [] Other     Abnormal Nutritional Status: [] malnutrition (see nutrition note), []underweight: BMI <19, [] morbid obesity: BMI >40, [] Cachexia     [] Sepsis   [] Hypovolemic shock, [] Cardiogenic shock, [] Hemorrhagic shock, [] Neurogenic shock   [] Acute respiratory failure   [] Cerebral edema, [] Brain compression / herniation   [] Functional quadriplegia   [] Acute blood loss anemia

## 2019-12-06 NOTE — PROGRESS NOTE ADULT - SUBJECTIVE AND OBJECTIVE BOX
Transfer acceptance note from neurosurgery to [address] [place] [address] course:      OVERNIGHT EVENTS:  No acute events overnight.    SUBJECTIVE / INTERVAL HPI: Patient seen and examined at bedside. Denies any complaints of chest pain, SOB, abdominal pain, N/V/D.    VITAL SIGNS:  Vital Signs Last 24 Hrs  T(C): 36.4 (06 Dec 2019 16:18), Max: 38.1 (05 Dec 2019 19:27)  T(F): 97.6 (06 Dec 2019 16:18), Max: 100.5 (05 Dec 2019 19:27)  HR: 118 (06 Dec 2019 16:32) (96 - 123)  BP: 118/66 (06 Dec 2019 16:32) (82/48 - 118/66)  BP(mean): 85 (06 Dec 2019 16:32) (56 - 85)  RR: 14 (06 Dec 2019 16:03) (14 - 101)  SpO2: 100% (06 Dec 2019 16:32) (92% - 100%)    PHYSICAL EXAM:  General: Lying comfortably in bed, NAD  HEENT: NC/AT, PERRL, anicteric sclera; MMM  Neck: supple  Cardiovascular: +S1/S2, RRR  Respiratory: CTA B/L, no W/R/R  Gastrointestinal: soft, NT/ND, +BS  Extremities: WWP, no edema or cyanosis  Vascular: 2+ radial, DP/PT pulses B/L  Neurological: AAOx3, no focal deficits    MEDICATIONS:  MEDICATIONS  (STANDING):  ascorbic acid 500 milliGRAM(s) Oral daily  bisacodyl 5 milliGRAM(s) Oral at bedtime  chlorhexidine 2% Cloths 1 Application(s) Topical <User Schedule>  dextrose 5%. 1000 milliLiter(s) (50 mL/Hr) IV Continuous <Continuous>  dextrose 50% Injectable 12.5 Gram(s) IV Push once  insulin glargine Injectable (LANTUS) 16 Unit(s) SubCutaneous at bedtime  insulin lispro (HumaLOG) corrective regimen sliding scale   SubCutaneous every 6 hours  multivitamin 1 Tablet(s) Oral daily  piperacillin/tazobactam IVPB.. 4.5 Gram(s) IV Intermittent every 6 hours  senna 2 Tablet(s) Oral at bedtime  sodium chloride 0.9%. 1000 milliLiter(s) (100 mL/Hr) IV Continuous <Continuous>  valACYclovir 1000 milliGRAM(s) Oral every 8 hours  vancomycin  IVPB      vancomycin  IVPB 1000 milliGRAM(s) IV Intermittent every 12 hours  zinc sulfate 220 milliGRAM(s) Oral daily    MEDICATIONS  (PRN):  acetaminophen    Suspension .. 650 milliGRAM(s) Oral every 6 hours PRN Temp greater or equal to 38C (100.4F), Mild Pain (1 - 3)  dextrose 40% Gel 15 Gram(s) Oral once PRN Blood Glucose LESS THAN 70 milliGRAM(s)/deciliter  glucagon  Injectable 1 milliGRAM(s) IntraMuscular once PRN Glucose LESS THAN 70 milligrams/deciliter  hydrALAZINE Injectable 10 milliGRAM(s) IV Push every 1 hour PRN SBP > 140MMHG HR< 65  labetalol Injectable 10 milliGRAM(s) IV Push every 1 hour PRN Systolic blood pressure > 140MMHG hR<65      ALLERGIES:  Allergies    No Known Allergies    Intolerances        LABS:                        9.8    10.71 )-----------( 166      ( 06 Dec 2019 05:08 )             30.2     12-06    136  |  101  |  21  ----------------------------<  183<H>  3.6   |  25  |  0.47<L>    Ca    8.5      06 Dec 2019 05:08  Phos  3.2     12-06  Mg     2.0     12-06      PT/INR - ( 06 Dec 2019 05:08 )   PT: 14.1 sec;   INR: 1.24          PTT - ( 06 Dec 2019 05:08 )  PTT:31.0 sec    CAPILLARY BLOOD GLUCOSE      POCT Blood Glucose.: 171 mg/dL (06 Dec 2019 18:12)      RADIOLOGY & ADDITIONAL TESTS: Reviewed. Transfer acceptance note from neurosurgery to [address] [place] [address] course:  Pt is an 89M with PMH DMII admitted s/p fall on 11/28. Pt found to have a SAH/SDH hematoma and was admitted to neurosurgery ICU. Pt was initially aphasic but moving all extremities spontaneously. Was started on NG tube feeds on 11/29.     OVERNIGHT EVENTS:  No acute events overnight.    SUBJECTIVE / INTERVAL HPI: Patient seen and examined at bedside. Denies any complaints of chest pain, SOB, abdominal pain, N/V/D.    VITAL SIGNS:  Vital Signs Last 24 Hrs  T(C): 36.4 (06 Dec 2019 16:18), Max: 38.1 (05 Dec 2019 19:27)  T(F): 97.6 (06 Dec 2019 16:18), Max: 100.5 (05 Dec 2019 19:27)  HR: 118 (06 Dec 2019 16:32) (96 - 123)  BP: 118/66 (06 Dec 2019 16:32) (82/48 - 118/66)  BP(mean): 85 (06 Dec 2019 16:32) (56 - 85)  RR: 14 (06 Dec 2019 16:03) (14 - 101)  SpO2: 100% (06 Dec 2019 16:32) (92% - 100%)    PHYSICAL EXAM:  General: Lying comfortably in bed, NAD  HEENT: NC/AT, PERRL, anicteric sclera; MMM  Neck: supple  Cardiovascular: +S1/S2, RRR  Respiratory: CTA B/L, no W/R/R  Gastrointestinal: soft, NT/ND, +BS  Extremities: WWP, no edema or cyanosis  Vascular: 2+ radial, DP/PT pulses B/L  Neurological: AAOx3, no focal deficits    MEDICATIONS:  MEDICATIONS  (STANDING):  ascorbic acid 500 milliGRAM(s) Oral daily  bisacodyl 5 milliGRAM(s) Oral at bedtime  chlorhexidine 2% Cloths 1 Application(s) Topical <User Schedule>  dextrose 5%. 1000 milliLiter(s) (50 mL/Hr) IV Continuous <Continuous>  dextrose 50% Injectable 12.5 Gram(s) IV Push once  insulin glargine Injectable (LANTUS) 16 Unit(s) SubCutaneous at bedtime  insulin lispro (HumaLOG) corrective regimen sliding scale   SubCutaneous every 6 hours  multivitamin 1 Tablet(s) Oral daily  piperacillin/tazobactam IVPB.. 4.5 Gram(s) IV Intermittent every 6 hours  senna 2 Tablet(s) Oral at bedtime  sodium chloride 0.9%. 1000 milliLiter(s) (100 mL/Hr) IV Continuous <Continuous>  valACYclovir 1000 milliGRAM(s) Oral every 8 hours  vancomycin  IVPB      vancomycin  IVPB 1000 milliGRAM(s) IV Intermittent every 12 hours  zinc sulfate 220 milliGRAM(s) Oral daily    MEDICATIONS  (PRN):  acetaminophen    Suspension .. 650 milliGRAM(s) Oral every 6 hours PRN Temp greater or equal to 38C (100.4F), Mild Pain (1 - 3)  dextrose 40% Gel 15 Gram(s) Oral once PRN Blood Glucose LESS THAN 70 milliGRAM(s)/deciliter  glucagon  Injectable 1 milliGRAM(s) IntraMuscular once PRN Glucose LESS THAN 70 milligrams/deciliter  hydrALAZINE Injectable 10 milliGRAM(s) IV Push every 1 hour PRN SBP > 140MMHG HR< 65  labetalol Injectable 10 milliGRAM(s) IV Push every 1 hour PRN Systolic blood pressure > 140MMHG hR<65      ALLERGIES:  Allergies    No Known Allergies    Intolerances        LABS:                        9.8    10.71 )-----------( 166      ( 06 Dec 2019 05:08 )             30.2     12-06    136  |  101  |  21  ----------------------------<  183<H>  3.6   |  25  |  0.47<L>    Ca    8.5      06 Dec 2019 05:08  Phos  3.2     12-06  Mg     2.0     12-06      PT/INR - ( 06 Dec 2019 05:08 )   PT: 14.1 sec;   INR: 1.24          PTT - ( 06 Dec 2019 05:08 )  PTT:31.0 sec    CAPILLARY BLOOD GLUCOSE      POCT Blood Glucose.: 171 mg/dL (06 Dec 2019 18:12)      RADIOLOGY & ADDITIONAL TESTS: Reviewed. Transfer acceptance note from neurosurgery to [address] [place] [address] course:  Pt is an 89M with PMH DMII admitted s/p fall on 11/28. Pt found to have a SAH/SDH hematoma and was admitted to neurosurgery ICU. Pt was initially aphasic but moving all extremities spontaneously. MRI showed hemorrhagic contusion in left inferior temporal lobe and small left subdural hemorrhage with no acute infarction. Failed speech and swallow evaluation with FEES. Was started on NG tube feeds on 11/29. Now admitted to [facility] for aspiration pneumonia, spiked fever to 100.9 on 12/5, started on vanc/zosyn. Given concern for aspiration and dysphagia, PEG was placed on 12/6.     SUBJECTIVE / INTERVAL HPI: Patient seen and examined at bedside. Coughing up thick, yellow sputum. States that his breathing feels improved. Denies any complaints of chest pain, SOB, abdominal pain, N/V/D.    VITAL SIGNS:  Vital Signs Last 24 Hrs  T(C): 36.4 (06 Dec 2019 16:18), Max: 38.1 (05 Dec 2019 19:27)  T(F): 97.6 (06 Dec 2019 16:18), Max: 100.5 (05 Dec 2019 19:27)  HR: 118 (06 Dec 2019 16:32) (96 - 123)  BP: 118/66 (06 Dec 2019 16:32) (82/48 - 118/66)  BP(mean): 85 (06 Dec 2019 16:32) (56 - 85)  RR: 14 (06 Dec 2019 16:03) (14 - 101)  SpO2: 100% (06 Dec 2019 16:32) (92% - 100%)    PHYSICAL EXAM:  General: Frail, cachectic man lying comfortably in bed, NAD, NG tube in place  HEENT: NC/AT, PERRL, anicteric sclera; MMM  Cardiovascular: +S1/S2, RRR  Respiratory: CTA B/L, decreased breath sounds at bases  Gastrointestinal: soft, NT/ND, +BS, PEG tube in place, clean/dry/intact  Extremities: WWP, no edema or cyanosis  Vascular: 2+ radial, DP/PT pulses B/L  Neurological: AAOx3, no focal deficits  Skin: Diffuse ecchymoses, decubitus ulcers over heels, elbows, sacrum    MEDICATIONS:  MEDICATIONS  (STANDING):  ascorbic acid 500 milliGRAM(s) Oral daily  bisacodyl 5 milliGRAM(s) Oral at bedtime  chlorhexidine 2% Cloths 1 Application(s) Topical <User Schedule>  dextrose 5%. 1000 milliLiter(s) (50 mL/Hr) IV Continuous <Continuous>  dextrose 50% Injectable 12.5 Gram(s) IV Push once  insulin glargine Injectable (LANTUS) 16 Unit(s) SubCutaneous at bedtime  insulin lispro (HumaLOG) corrective regimen sliding scale   SubCutaneous every 6 hours  multivitamin 1 Tablet(s) Oral daily  piperacillin/tazobactam IVPB.. 4.5 Gram(s) IV Intermittent every 6 hours  senna 2 Tablet(s) Oral at bedtime  sodium chloride 0.9%. 1000 milliLiter(s) (100 mL/Hr) IV Continuous <Continuous>  valACYclovir 1000 milliGRAM(s) Oral every 8 hours  vancomycin  IVPB      vancomycin  IVPB 1000 milliGRAM(s) IV Intermittent every 12 hours  zinc sulfate 220 milliGRAM(s) Oral daily    MEDICATIONS  (PRN):  acetaminophen    Suspension .. 650 milliGRAM(s) Oral every 6 hours PRN Temp greater or equal to 38C (100.4F), Mild Pain (1 - 3)  dextrose 40% Gel 15 Gram(s) Oral once PRN Blood Glucose LESS THAN 70 milliGRAM(s)/deciliter  glucagon  Injectable 1 milliGRAM(s) IntraMuscular once PRN Glucose LESS THAN 70 milligrams/deciliter  hydrALAZINE Injectable 10 milliGRAM(s) IV Push every 1 hour PRN SBP > 140MMHG HR< 65  labetalol Injectable 10 milliGRAM(s) IV Push every 1 hour PRN Systolic blood pressure > 140MMHG hR<65      ALLERGIES:  Allergies    No Known Allergies    Intolerances        LABS:                        9.8    10.71 )-----------( 166      ( 06 Dec 2019 05:08 )             30.2     12-06    136  |  101  |  21  ----------------------------<  183<H>  3.6   |  25  |  0.47<L>    Ca    8.5      06 Dec 2019 05:08  Phos  3.2     12-06  Mg     2.0     12-06      PT/INR - ( 06 Dec 2019 05:08 )   PT: 14.1 sec;   INR: 1.24          PTT - ( 06 Dec 2019 05:08 )  PTT:31.0 sec    CAPILLARY BLOOD GLUCOSE      POCT Blood Glucose.: 171 mg/dL (06 Dec 2019 18:12)      RADIOLOGY & ADDITIONAL TESTS: Reviewed.

## 2019-12-06 NOTE — PROGRESS NOTE ADULT - PROBLEM SELECTOR PLAN 5
Incidental finding, TSH WNL  - Outpatient follow up Pt has multiple decubitus ulcers over elbows, heels, sacrum.  - Valtrex for herpetic sacral rash

## 2019-12-06 NOTE — PROGRESS NOTE ADULT - PROBLEM SELECTOR PLAN 4
S/p subarachnoid bleed  - Stable per neurosurgery  - Monitor for changes in neurologic status, currently A&Ox3

## 2019-12-06 NOTE — PROGRESS NOTE ADULT - PROBLEM SELECTOR PLAN 6
F: None  E: Replete PRN for K<4, Mg<2  N: Glucerna 1.5  DVT PPx: F: None  E: Replete PRN for K<4, Mg<2  N: Glucerna 1.5  DVT PPx: Hold in setting of recent intracranial bleed  Code status: Full code  Dispo: [facility] F: None  E: Replete PRN for K<4, Mg<2  N: Glucerna 1.5  DVT PPx: Hold in setting of recent intracranial bleed  GI PPx: None  Bowel: Senna, dulcolax  Pain: Tylenol  Code status: Full code  Dispo: [facility] Incidental finding, TSH WNL  - Outpatient follow up

## 2019-12-06 NOTE — PROGRESS NOTE ADULT - ASSESSMENT
Pt is an 89M with PMH DMII admitted s/p fall with traumatic SAH/SDH on 11/28, now admitted to [facility] with aspiration pneumonia and s/p PEG.

## 2019-12-06 NOTE — PROGRESS NOTE ADULT - PROBLEM SELECTOR PLAN 7
1) PCP Contacted on Admission: (Y/N) --> Name & Phone #:  2) Date of Contact with PCP:  3) PCP Contacted at Discharge: (Y/N)  4) Summary of Handoff Given to PCP:   5) Post-Discharge Appointment Date and Location: .  F: None  E: Replete PRN for K<4, Mg<2  N: Glucerna 1.5 @ 55cc/hr  DVT PPx: Hold in setting of recent intracranial bleed  GI PPx: None  Bowel: Senna, dulcolax  Pain: Tylenol  Code status: Full code  Dispo: [facility]

## 2019-12-07 LAB
ANION GAP SERPL CALC-SCNC: 10 MMOL/L — SIGNIFICANT CHANGE UP (ref 5–17)
BUN SERPL-MCNC: 24 MG/DL — HIGH (ref 7–23)
CALCIUM SERPL-MCNC: 8.8 MG/DL — SIGNIFICANT CHANGE UP (ref 8.4–10.5)
CHLORIDE SERPL-SCNC: 105 MMOL/L — SIGNIFICANT CHANGE UP (ref 96–108)
CO2 SERPL-SCNC: 25 MMOL/L — SIGNIFICANT CHANGE UP (ref 22–31)
CREAT SERPL-MCNC: 0.5 MG/DL — SIGNIFICANT CHANGE UP (ref 0.5–1.3)
GLUCOSE BLDC GLUCOMTR-MCNC: 161 MG/DL — HIGH (ref 70–99)
GLUCOSE BLDC GLUCOMTR-MCNC: 174 MG/DL — HIGH (ref 70–99)
GLUCOSE BLDC GLUCOMTR-MCNC: 93 MG/DL — SIGNIFICANT CHANGE UP (ref 70–99)
GLUCOSE BLDC GLUCOMTR-MCNC: 95 MG/DL — SIGNIFICANT CHANGE UP (ref 70–99)
GLUCOSE SERPL-MCNC: 121 MG/DL — HIGH (ref 70–99)
HCT VFR BLD CALC: 31.8 % — LOW (ref 39–50)
HGB BLD-MCNC: 9.7 G/DL — LOW (ref 13–17)
MAGNESIUM SERPL-MCNC: 1.9 MG/DL — SIGNIFICANT CHANGE UP (ref 1.6–2.6)
MCHC RBC-ENTMCNC: 30.5 GM/DL — LOW (ref 32–36)
MCHC RBC-ENTMCNC: 30.8 PG — SIGNIFICANT CHANGE UP (ref 27–34)
MCV RBC AUTO: 101 FL — HIGH (ref 80–100)
MRSA PCR RESULT.: NEGATIVE — SIGNIFICANT CHANGE UP
NRBC # BLD: 0 /100 WBCS — SIGNIFICANT CHANGE UP (ref 0–0)
PHOSPHATE SERPL-MCNC: 2.6 MG/DL — SIGNIFICANT CHANGE UP (ref 2.5–4.5)
PLATELET # BLD AUTO: 190 K/UL — SIGNIFICANT CHANGE UP (ref 150–400)
POTASSIUM SERPL-MCNC: 3.8 MMOL/L — SIGNIFICANT CHANGE UP (ref 3.5–5.3)
POTASSIUM SERPL-SCNC: 3.8 MMOL/L — SIGNIFICANT CHANGE UP (ref 3.5–5.3)
RBC # BLD: 3.15 M/UL — LOW (ref 4.2–5.8)
RBC # FLD: 15 % — HIGH (ref 10.3–14.5)
S AUREUS DNA NOSE QL NAA+PROBE: POSITIVE
SODIUM SERPL-SCNC: 140 MMOL/L — SIGNIFICANT CHANGE UP (ref 135–145)
VANCOMYCIN TROUGH SERPL-MCNC: 10.7 UG/ML — SIGNIFICANT CHANGE UP (ref 10–20)
WBC # BLD: 9.4 K/UL — SIGNIFICANT CHANGE UP (ref 3.8–10.5)
WBC # FLD AUTO: 9.4 K/UL — SIGNIFICANT CHANGE UP (ref 3.8–10.5)

## 2019-12-07 PROCEDURE — 99233 SBSQ HOSP IP/OBS HIGH 50: CPT | Mod: GC

## 2019-12-07 PROCEDURE — 71045 X-RAY EXAM CHEST 1 VIEW: CPT | Mod: 26

## 2019-12-07 RX ORDER — VANCOMYCIN HCL 1 G
1250 VIAL (EA) INTRAVENOUS EVERY 12 HOURS
Refills: 0 | Status: DISCONTINUED | OUTPATIENT
Start: 2019-12-07 | End: 2019-12-07

## 2019-12-07 RX ORDER — POTASSIUM CHLORIDE 20 MEQ
10 PACKET (EA) ORAL ONCE
Refills: 0 | Status: COMPLETED | OUTPATIENT
Start: 2019-12-07 | End: 2019-12-07

## 2019-12-07 RX ADMIN — PIPERACILLIN AND TAZOBACTAM 200 GRAM(S): 4; .5 INJECTION, POWDER, LYOPHILIZED, FOR SOLUTION INTRAVENOUS at 04:36

## 2019-12-07 RX ADMIN — PIPERACILLIN AND TAZOBACTAM 200 GRAM(S): 4; .5 INJECTION, POWDER, LYOPHILIZED, FOR SOLUTION INTRAVENOUS at 18:13

## 2019-12-07 RX ADMIN — ZINC SULFATE TAB 220 MG (50 MG ZINC EQUIVALENT) 220 MILLIGRAM(S): 220 (50 ZN) TAB at 12:27

## 2019-12-07 RX ADMIN — Medication 1 TABLET(S): at 15:25

## 2019-12-07 RX ADMIN — VALACYCLOVIR 1000 MILLIGRAM(S): 500 TABLET, FILM COATED ORAL at 22:28

## 2019-12-07 RX ADMIN — Medication 166.67 MILLIGRAM(S): at 10:50

## 2019-12-07 RX ADMIN — VALACYCLOVIR 1000 MILLIGRAM(S): 500 TABLET, FILM COATED ORAL at 06:44

## 2019-12-07 RX ADMIN — INSULIN GLARGINE 16 UNIT(S): 100 INJECTION, SOLUTION SUBCUTANEOUS at 22:27

## 2019-12-07 RX ADMIN — VALACYCLOVIR 1000 MILLIGRAM(S): 500 TABLET, FILM COATED ORAL at 15:25

## 2019-12-07 RX ADMIN — Medication 100 MILLIEQUIVALENT(S): at 12:27

## 2019-12-07 RX ADMIN — Medication 100 MILLIEQUIVALENT(S): at 08:32

## 2019-12-07 RX ADMIN — Medication 2: at 18:12

## 2019-12-07 RX ADMIN — Medication 500 MILLIGRAM(S): at 12:27

## 2019-12-07 RX ADMIN — PIPERACILLIN AND TAZOBACTAM 200 GRAM(S): 4; .5 INJECTION, POWDER, LYOPHILIZED, FOR SOLUTION INTRAVENOUS at 12:26

## 2019-12-07 NOTE — PROGRESS NOTE ADULT - SUBJECTIVE AND OBJECTIVE BOX
OVERNIGHT EVENTS:    SUBJECTIVE / INTERVAL HPI: Patient seen and examined at bedside.     VITAL SIGNS:  Vital Signs Last 24 Hrs  T(C): 36.5 (07 Dec 2019 05:50), Max: 37.2 (06 Dec 2019 12:31)  T(F): 97.7 (07 Dec 2019 05:50), Max: 98.9 (06 Dec 2019 12:31)  HR: 90 (07 Dec 2019 06:03) (90 - 118)  BP: 104/57 (07 Dec 2019 04:10) (92/50 - 121/58)  BP(mean): 78 (07 Dec 2019 04:10) (59 - 85)  RR: 14 (07 Dec 2019 06:03) (14 - 31)  SpO2: 97% (07 Dec 2019 06:03) (96% - 100%)    PHYSICAL EXAM:        MEDICATIONS:  MEDICATIONS  (STANDING):  ascorbic acid 500 milliGRAM(s) Oral daily  dextrose 5%. 1000 milliLiter(s) (50 mL/Hr) IV Continuous <Continuous>  dextrose 50% Injectable 12.5 Gram(s) IV Push once  insulin glargine Injectable (LANTUS) 16 Unit(s) SubCutaneous at bedtime  insulin lispro (HumaLOG) corrective regimen sliding scale   SubCutaneous every 6 hours  multivitamin 1 Tablet(s) Oral daily  piperacillin/tazobactam IVPB.. 4.5 Gram(s) IV Intermittent every 6 hours  potassium chloride  10 mEq/100 mL IVPB 10 milliEquivalent(s) IV Intermittent once  potassium chloride  10 mEq/100 mL IVPB 10 milliEquivalent(s) IV Intermittent once  senna 2 Tablet(s) Oral at bedtime  sodium chloride 0.9%. 1000 milliLiter(s) (100 mL/Hr) IV Continuous <Continuous>  valACYclovir 1000 milliGRAM(s) Oral every 8 hours  vancomycin  IVPB 1250 milliGRAM(s) IV Intermittent every 12 hours  zinc sulfate 220 milliGRAM(s) Oral daily    MEDICATIONS  (PRN):  acetaminophen    Suspension .. 650 milliGRAM(s) Oral every 6 hours PRN Temp greater or equal to 38C (100.4F), Mild Pain (1 - 3)  dextrose 40% Gel 15 Gram(s) Oral once PRN Blood Glucose LESS THAN 70 milliGRAM(s)/deciliter  glucagon  Injectable 1 milliGRAM(s) IntraMuscular once PRN Glucose LESS THAN 70 milligrams/deciliter      ALLERGIES:  Allergies    No Known Allergies    Intolerances        LABS:                        9.7    9.40  )-----------( 190      ( 07 Dec 2019 06:39 )             31.8     12-07    140  |  105  |  24<H>  ----------------------------<  121<H>  3.8   |  25  |  0.50    Ca    8.8      07 Dec 2019 06:39  Phos  2.6     12-07  Mg     1.9     12-07      PT/INR - ( 06 Dec 2019 05:08 )   PT: 14.1 sec;   INR: 1.24          PTT - ( 06 Dec 2019 05:08 )  PTT:31.0 sec    CAPILLARY BLOOD GLUCOSE      POCT Blood Glucose.: 95 mg/dL (07 Dec 2019 06:26)      RADIOLOGY & ADDITIONAL TESTS: Reviewed. 7L to [facility] transfer note    Hospital course:  Pt is an 89M with PMH DMII admitted s/p fall on 11/28. Pt found to have a SAH/SDH hematoma and was admitted to neurosurgery ICU. Pt was initially aphasic but moving all extremities spontaneously. MRI showed hemorrhagic contusion in left inferior temporal lobe and small left subdural hemorrhage with no acute infarction. Failed speech and swallow evaluation with FEES. Was started on NG tube feeds on 11/29. Now admitted to [facility] for aspiration pneumonia, spiked fever to 100.9 on 12/5, started on vanc/zosyn. Given concern for aspiration and dysphagia, PEG was placed on 12/6 and feeds were started after 24hours. He was able to walk with PT/OT. MRSA swab neg for MSRA, Vanc discontinued today. Patient now ready for stepdown to [facility]    OVERNIGHT EVENTS: NAEO    SUBJECTIVE / INTERVAL HPI: Patient seen and examined at bedside.     VITAL SIGNS:  Vital Signs Last 24 Hrs  T(C): 36.5 (07 Dec 2019 05:50), Max: 37.2 (06 Dec 2019 12:31)  T(F): 97.7 (07 Dec 2019 05:50), Max: 98.9 (06 Dec 2019 12:31)  HR: 90 (07 Dec 2019 06:03) (90 - 118)  BP: 104/57 (07 Dec 2019 04:10) (92/50 - 121/58)  BP(mean): 78 (07 Dec 2019 04:10) (59 - 85)  RR: 14 (07 Dec 2019 06:03) (14 - 31)  SpO2: 97% (07 Dec 2019 06:03) (96% - 100%)    PHYSICAL EXAM:        MEDICATIONS:  MEDICATIONS  (STANDING):  ascorbic acid 500 milliGRAM(s) Oral daily  dextrose 5%. 1000 milliLiter(s) (50 mL/Hr) IV Continuous <Continuous>  dextrose 50% Injectable 12.5 Gram(s) IV Push once  insulin glargine Injectable (LANTUS) 16 Unit(s) SubCutaneous at bedtime  insulin lispro (HumaLOG) corrective regimen sliding scale   SubCutaneous every 6 hours  multivitamin 1 Tablet(s) Oral daily  piperacillin/tazobactam IVPB.. 4.5 Gram(s) IV Intermittent every 6 hours  potassium chloride  10 mEq/100 mL IVPB 10 milliEquivalent(s) IV Intermittent once  potassium chloride  10 mEq/100 mL IVPB 10 milliEquivalent(s) IV Intermittent once  senna 2 Tablet(s) Oral at bedtime  sodium chloride 0.9%. 1000 milliLiter(s) (100 mL/Hr) IV Continuous <Continuous>  valACYclovir 1000 milliGRAM(s) Oral every 8 hours  vancomycin  IVPB 1250 milliGRAM(s) IV Intermittent every 12 hours  zinc sulfate 220 milliGRAM(s) Oral daily    MEDICATIONS  (PRN):  acetaminophen    Suspension .. 650 milliGRAM(s) Oral every 6 hours PRN Temp greater or equal to 38C (100.4F), Mild Pain (1 - 3)  dextrose 40% Gel 15 Gram(s) Oral once PRN Blood Glucose LESS THAN 70 milliGRAM(s)/deciliter  glucagon  Injectable 1 milliGRAM(s) IntraMuscular once PRN Glucose LESS THAN 70 milligrams/deciliter      ALLERGIES:  Allergies    No Known Allergies    Intolerances        LABS:                        9.7    9.40  )-----------( 190      ( 07 Dec 2019 06:39 )             31.8     12-07    140  |  105  |  24<H>  ----------------------------<  121<H>  3.8   |  25  |  0.50    Ca    8.8      07 Dec 2019 06:39  Phos  2.6     12-07  Mg     1.9     12-07      PT/INR - ( 06 Dec 2019 05:08 )   PT: 14.1 sec;   INR: 1.24          PTT - ( 06 Dec 2019 05:08 )  PTT:31.0 sec    CAPILLARY BLOOD GLUCOSE      POCT Blood Glucose.: 95 mg/dL (07 Dec 2019 06:26)      RADIOLOGY & ADDITIONAL TESTS: Reviewed. 7L to [facility] transfer note    Hospital course:  Pt is an 89M with PMH DMII admitted s/p fall on 11/28. Pt found to have a SAH/SDH hematoma and was admitted to neurosurgery ICU. Pt was initially aphasic but moving all extremities spontaneously. MRI showed hemorrhagic contusion in left inferior temporal lobe and small left subdural hemorrhage with no acute infarction. Failed speech and swallow evaluation with FEES. Was started on NG tube feeds on 11/29. Now admitted to [facility] for aspiration pneumonia, spiked fever to 100.9 on 12/5, started on vanc/zosyn. Given concern for aspiration and dysphagia, PEG was placed on 12/6 and feeds were started after 24hours. He was able to walk with PT/OT. MRSA swab neg for MSRA, Vanc discontinued today. Patient now ready for stepdown to [facility]    OVERNIGHT EVENTS: NAEO    SUBJECTIVE / INTERVAL HPI: Patient seen and examined at bedside.     VITAL SIGNS:  Vital Signs Last 24 Hrs  T(C): 36.5 (07 Dec 2019 05:50), Max: 37.2 (06 Dec 2019 12:31)  T(F): 97.7 (07 Dec 2019 05:50), Max: 98.9 (06 Dec 2019 12:31)  HR: 90 (07 Dec 2019 06:03) (90 - 118)  BP: 104/57 (07 Dec 2019 04:10) (92/50 - 121/58)  BP(mean): 78 (07 Dec 2019 04:10) (59 - 85)  RR: 14 (07 Dec 2019 06:03) (14 - 31)  SpO2: 97% (07 Dec 2019 06:03) (96% - 100%)      PHYSICAL EXAM:  General: Frail, cachectic   HEENT: NC/AT, PERRL, anicteric sclera; MMM  Cardiovascular: +S1/S2, RRR  Respiratory: CTA B/L, decreased breath sounds at bases b/l  Gastrointestinal: soft, NT/ND, +BS, PEG tube in place, clean/dry/intact w/o significant erythema or drainage around site  Extremities: WWP, no edema or cyanosis  Vascular: 2+ radial pulses B/L  Neurological: AAOx3, no focal deficits  Skin: Diffuse ecchymoses, decubitus ulcers over heels, elbows, sacrum      MEDICATIONS:  MEDICATIONS  (STANDING):  ascorbic acid 500 milliGRAM(s) Oral daily  dextrose 5%. 1000 milliLiter(s) (50 mL/Hr) IV Continuous <Continuous>  dextrose 50% Injectable 12.5 Gram(s) IV Push once  insulin glargine Injectable (LANTUS) 16 Unit(s) SubCutaneous at bedtime  insulin lispro (HumaLOG) corrective regimen sliding scale   SubCutaneous every 6 hours  multivitamin 1 Tablet(s) Oral daily  piperacillin/tazobactam IVPB.. 4.5 Gram(s) IV Intermittent every 6 hours  potassium chloride  10 mEq/100 mL IVPB 10 milliEquivalent(s) IV Intermittent once  potassium chloride  10 mEq/100 mL IVPB 10 milliEquivalent(s) IV Intermittent once  senna 2 Tablet(s) Oral at bedtime  sodium chloride 0.9%. 1000 milliLiter(s) (100 mL/Hr) IV Continuous <Continuous>  valACYclovir 1000 milliGRAM(s) Oral every 8 hours  vancomycin  IVPB 1250 milliGRAM(s) IV Intermittent every 12 hours  zinc sulfate 220 milliGRAM(s) Oral daily    MEDICATIONS  (PRN):  acetaminophen    Suspension .. 650 milliGRAM(s) Oral every 6 hours PRN Temp greater or equal to 38C (100.4F), Mild Pain (1 - 3)  dextrose 40% Gel 15 Gram(s) Oral once PRN Blood Glucose LESS THAN 70 milliGRAM(s)/deciliter  glucagon  Injectable 1 milliGRAM(s) IntraMuscular once PRN Glucose LESS THAN 70 milligrams/deciliter      ALLERGIES:  Allergies    No Known Allergies    Intolerances        LABS:                        9.7    9.40  )-----------( 190      ( 07 Dec 2019 06:39 )             31.8     12-07    140  |  105  |  24<H>  ----------------------------<  121<H>  3.8   |  25  |  0.50    Ca    8.8      07 Dec 2019 06:39  Phos  2.6     12-07  Mg     1.9     12-07      PT/INR - ( 06 Dec 2019 05:08 )   PT: 14.1 sec;   INR: 1.24          PTT - ( 06 Dec 2019 05:08 )  PTT:31.0 sec    CAPILLARY BLOOD GLUCOSE      POCT Blood Glucose.: 95 mg/dL (07 Dec 2019 06:26)      RADIOLOGY & ADDITIONAL TESTS: Reviewed.

## 2019-12-07 NOTE — PROGRESS NOTE ADULT - ASSESSMENT
Pt is an 89M with PMH DMII admitted s/p fall with traumatic SAH/SDH on 11/28, now admitted to [facility] with aspiration pneumonia and s/p PEG. Pt is an 89M with PMH DMII admitted s/p fall with traumatic SAH/SDH on 11/28, now admitted to [facility] with aspiration pneumonia and s/p PEG now stable for stepdown to [facility].

## 2019-12-07 NOTE — PROGRESS NOTE ADULT - PROBLEM SELECTOR PLAN 7
.  F: None  E: Replete PRN for K<4, Mg<2  N: Glucerna 1.5 @ 55cc/hr  DVT PPx: Hold in setting of recent intracranial bleed  GI PPx: None  Bowel: Senna, dulcolax  Pain: Tylenol  Code status: Full code  Dispo: [facility] F: None  E: Replete PRN for K<4, Mg<2  N: Glucerna 1.5 @ 55cc/hr  DVT PPx: Hold in setting of recent intracranial bleed  GI PPx: None  Bowel: Senna, dulcolax  Pain: Tylenol  Code status: Full code  Dispo: [facility]

## 2019-12-07 NOTE — PROGRESS NOTE ADULT - PROBLEM SELECTOR PLAN 1
Pt with cough productive of yellow sputum, spiked temperature 100.9 on 12/4, AM WBC 10.71. CXR with RLL pneumonia consistent with aspiration.  - f/u AM CXR  - Continue vanc/zosyn  - f/u AM vanc trough  - NPO with tube feeds Pt with cough productive of yellow sputum, spiked temperature 100.9 on 12/4, AM WBC 10.71. CXR with RLL pneumonia consistent with aspiration. MRSA swab neg. Vanc discontinued 12/7.  - Continue zosyn  - NPO with tube feeds through PEG

## 2019-12-07 NOTE — PROGRESS NOTE ADULT - PROBLEM SELECTOR PLAN 3
S/p NG tube, PEG  - Continue meds/tube feeds through NG tube  - Can use PEG after 24 hrs S/p NG tube, PEG  - crushable meds, feeds through PEG

## 2019-12-08 LAB
ANION GAP SERPL CALC-SCNC: 7 MMOL/L — SIGNIFICANT CHANGE UP (ref 5–17)
BASOPHILS # BLD AUTO: 0.02 K/UL — SIGNIFICANT CHANGE UP (ref 0–0.2)
BASOPHILS NFR BLD AUTO: 0.2 % — SIGNIFICANT CHANGE UP (ref 0–2)
BUN SERPL-MCNC: 21 MG/DL — SIGNIFICANT CHANGE UP (ref 7–23)
CALCIUM SERPL-MCNC: 8.4 MG/DL — SIGNIFICANT CHANGE UP (ref 8.4–10.5)
CHLORIDE SERPL-SCNC: 104 MMOL/L — SIGNIFICANT CHANGE UP (ref 96–108)
CO2 SERPL-SCNC: 25 MMOL/L — SIGNIFICANT CHANGE UP (ref 22–31)
CREAT SERPL-MCNC: 0.49 MG/DL — LOW (ref 0.5–1.3)
EOSINOPHIL # BLD AUTO: 0.04 K/UL — SIGNIFICANT CHANGE UP (ref 0–0.5)
EOSINOPHIL NFR BLD AUTO: 0.5 % — SIGNIFICANT CHANGE UP (ref 0–6)
GLUCOSE BLDC GLUCOMTR-MCNC: 180 MG/DL — HIGH (ref 70–99)
GLUCOSE BLDC GLUCOMTR-MCNC: 182 MG/DL — HIGH (ref 70–99)
GLUCOSE BLDC GLUCOMTR-MCNC: 249 MG/DL — HIGH (ref 70–99)
GLUCOSE BLDC GLUCOMTR-MCNC: 288 MG/DL — HIGH (ref 70–99)
GLUCOSE BLDC GLUCOMTR-MCNC: 329 MG/DL — HIGH (ref 70–99)
GLUCOSE SERPL-MCNC: 221 MG/DL — HIGH (ref 70–99)
HCT VFR BLD CALC: 33.7 % — LOW (ref 39–50)
HGB BLD-MCNC: 10.1 G/DL — LOW (ref 13–17)
IMM GRANULOCYTES NFR BLD AUTO: 0.7 % — SIGNIFICANT CHANGE UP (ref 0–1.5)
LYMPHOCYTES # BLD AUTO: 0.36 K/UL — LOW (ref 1–3.3)
LYMPHOCYTES # BLD AUTO: 4.4 % — LOW (ref 13–44)
MAGNESIUM SERPL-MCNC: 1.8 MG/DL — SIGNIFICANT CHANGE UP (ref 1.6–2.6)
MCHC RBC-ENTMCNC: 30 GM/DL — LOW (ref 32–36)
MCHC RBC-ENTMCNC: 30.1 PG — SIGNIFICANT CHANGE UP (ref 27–34)
MCV RBC AUTO: 100.6 FL — HIGH (ref 80–100)
MONOCYTES # BLD AUTO: 0.38 K/UL — SIGNIFICANT CHANGE UP (ref 0–0.9)
MONOCYTES NFR BLD AUTO: 4.6 % — SIGNIFICANT CHANGE UP (ref 2–14)
NEUTROPHILS # BLD AUTO: 7.35 K/UL — SIGNIFICANT CHANGE UP (ref 1.8–7.4)
NEUTROPHILS NFR BLD AUTO: 89.6 % — HIGH (ref 43–77)
NRBC # BLD: 0 /100 WBCS — SIGNIFICANT CHANGE UP (ref 0–0)
PHOSPHATE SERPL-MCNC: 2.7 MG/DL — SIGNIFICANT CHANGE UP (ref 2.5–4.5)
PLATELET # BLD AUTO: 211 K/UL — SIGNIFICANT CHANGE UP (ref 150–400)
POTASSIUM SERPL-MCNC: 4 MMOL/L — SIGNIFICANT CHANGE UP (ref 3.5–5.3)
POTASSIUM SERPL-SCNC: 4 MMOL/L — SIGNIFICANT CHANGE UP (ref 3.5–5.3)
PROCALCITONIN SERPL-MCNC: 0.78 NG/ML — HIGH (ref 0.02–0.1)
RBC # BLD: 3.35 M/UL — LOW (ref 4.2–5.8)
RBC # FLD: 14.9 % — HIGH (ref 10.3–14.5)
SODIUM SERPL-SCNC: 136 MMOL/L — SIGNIFICANT CHANGE UP (ref 135–145)
WBC # BLD: 8.21 K/UL — SIGNIFICANT CHANGE UP (ref 3.8–10.5)
WBC # FLD AUTO: 8.21 K/UL — SIGNIFICANT CHANGE UP (ref 3.8–10.5)

## 2019-12-08 PROCEDURE — 71045 X-RAY EXAM CHEST 1 VIEW: CPT | Mod: 26

## 2019-12-08 PROCEDURE — 99233 SBSQ HOSP IP/OBS HIGH 50: CPT | Mod: GC

## 2019-12-08 RX ORDER — MAGNESIUM SULFATE 500 MG/ML
2 VIAL (ML) INJECTION ONCE
Refills: 0 | Status: COMPLETED | OUTPATIENT
Start: 2019-12-08 | End: 2019-12-08

## 2019-12-08 RX ORDER — FUROSEMIDE 40 MG
20 TABLET ORAL ONCE
Refills: 0 | Status: COMPLETED | OUTPATIENT
Start: 2019-12-08 | End: 2019-12-08

## 2019-12-08 RX ADMIN — Medication 500 MILLIGRAM(S): at 12:14

## 2019-12-08 RX ADMIN — Medication 50 GRAM(S): at 13:00

## 2019-12-08 RX ADMIN — Medication 2: at 06:20

## 2019-12-08 RX ADMIN — PIPERACILLIN AND TAZOBACTAM 200 GRAM(S): 4; .5 INJECTION, POWDER, LYOPHILIZED, FOR SOLUTION INTRAVENOUS at 00:05

## 2019-12-08 RX ADMIN — PIPERACILLIN AND TAZOBACTAM 200 GRAM(S): 4; .5 INJECTION, POWDER, LYOPHILIZED, FOR SOLUTION INTRAVENOUS at 12:13

## 2019-12-08 RX ADMIN — VALACYCLOVIR 1000 MILLIGRAM(S): 500 TABLET, FILM COATED ORAL at 14:55

## 2019-12-08 RX ADMIN — PIPERACILLIN AND TAZOBACTAM 200 GRAM(S): 4; .5 INJECTION, POWDER, LYOPHILIZED, FOR SOLUTION INTRAVENOUS at 18:49

## 2019-12-08 RX ADMIN — INSULIN GLARGINE 16 UNIT(S): 100 INJECTION, SOLUTION SUBCUTANEOUS at 22:27

## 2019-12-08 RX ADMIN — Medication 8: at 18:49

## 2019-12-08 RX ADMIN — Medication 20 MILLIGRAM(S): at 12:14

## 2019-12-08 RX ADMIN — VALACYCLOVIR 1000 MILLIGRAM(S): 500 TABLET, FILM COATED ORAL at 06:20

## 2019-12-08 RX ADMIN — ZINC SULFATE TAB 220 MG (50 MG ZINC EQUIVALENT) 220 MILLIGRAM(S): 220 (50 ZN) TAB at 12:14

## 2019-12-08 RX ADMIN — Medication 1 TABLET(S): at 12:14

## 2019-12-08 RX ADMIN — Medication 2: at 12:14

## 2019-12-08 RX ADMIN — Medication 6: at 01:09

## 2019-12-08 RX ADMIN — PIPERACILLIN AND TAZOBACTAM 200 GRAM(S): 4; .5 INJECTION, POWDER, LYOPHILIZED, FOR SOLUTION INTRAVENOUS at 06:20

## 2019-12-08 RX ADMIN — VALACYCLOVIR 1000 MILLIGRAM(S): 500 TABLET, FILM COATED ORAL at 22:27

## 2019-12-08 NOTE — SWALLOW BEDSIDE ASSESSMENT ADULT - MUCOSAL QUALITY
Pt w/ clear vocal quality at rest.
Mildly wet/gurgly vocal quality at rest, cleared given cues to cough. Dry oral mucosa w/ thick dried secretions on hard palate and thick white secretions on velum and posterior faucial arches. Extensive oral care completed and secretions removed.
Pt w/ wet/gurgly vocal quality at rest upon entry. Pt able to clear only momentarily given multiple cues to cough/throat clear. Per RN, pt receiving chest PT and regular oral suction; pt intermittently independently expectorating secretions.

## 2019-12-08 NOTE — SWALLOW BEDSIDE ASSESSMENT ADULT - PHARYNGEAL PHASE
Pt w/ immediate throat clear x3/5 w/ 1/2 tsp thins and x2/2 w/ 1/2 tsp nectar, suggestive of aspiration./Throat clear post oral intake

## 2019-12-08 NOTE — PROGRESS NOTE ADULT - SUBJECTIVE AND OBJECTIVE BOX
OVERNIGHT EVENTS:  No acute events overnight.    SUBJECTIVE / INTERVAL HPI: Patient seen and examined at bedside. Denies any complaints of chest pain, SOB, abdominal pain, N/V/D.    VITAL SIGNS:  Vital Signs Last 24 Hrs  T(C): 36.6 (08 Dec 2019 06:25), Max: 36.7 (08 Dec 2019 02:07)  T(F): 97.9 (08 Dec 2019 06:25), Max: 98.1 (08 Dec 2019 02:07)  HR: 123 (08 Dec 2019 08:54) (94 - 123)  BP: 127/62 (08 Dec 2019 08:25) (108/56 - 127/62)  BP(mean): 89 (08 Dec 2019 08:25) (76 - 89)  RR: 19 (08 Dec 2019 08:54) (17 - 22)  SpO2: 93% (08 Dec 2019 08:54) (91% - 97%)    PHYSICAL EXAM:    General: Lying comfortably in bed, NAD  HEENT: NC/AT, PERRL, anicteric sclera; MMM  Neck: supple  Cardiovascular: +S1/S2, RRR  Respiratory: CTA B/L, no W/R/R  Gastrointestinal: soft, NT/ND, +BS  Extremities: WWP, no edema or cyanosis  Vascular: 2+ radial, DP/PT pulses B/L  Neurological: AAOx3, no focal deficits    MEDICATIONS:  MEDICATIONS  (STANDING):  ascorbic acid 500 milliGRAM(s) Oral daily  dextrose 5%. 1000 milliLiter(s) (50 mL/Hr) IV Continuous <Continuous>  dextrose 50% Injectable 12.5 Gram(s) IV Push once  furosemide   Injectable 20 milliGRAM(s) IV Push once  insulin glargine Injectable (LANTUS) 16 Unit(s) SubCutaneous at bedtime  insulin lispro (HumaLOG) corrective regimen sliding scale   SubCutaneous every 6 hours  multivitamin 1 Tablet(s) Oral daily  piperacillin/tazobactam IVPB.. 4.5 Gram(s) IV Intermittent every 6 hours  sodium chloride 0.9%. 1000 milliLiter(s) (100 mL/Hr) IV Continuous <Continuous>  valACYclovir 1000 milliGRAM(s) Oral every 8 hours  zinc sulfate 220 milliGRAM(s) Oral daily    MEDICATIONS  (PRN):  acetaminophen    Suspension .. 650 milliGRAM(s) Oral every 6 hours PRN Temp greater or equal to 38C (100.4F), Mild Pain (1 - 3)  dextrose 40% Gel 15 Gram(s) Oral once PRN Blood Glucose LESS THAN 70 milliGRAM(s)/deciliter  glucagon  Injectable 1 milliGRAM(s) IntraMuscular once PRN Glucose LESS THAN 70 milligrams/deciliter      ALLERGIES:  Allergies    No Known Allergies    Intolerances        LABS:                        10.1   8.21  )-----------( 211      ( 08 Dec 2019 05:55 )             33.7     12-08    136  |  104  |  21  ----------------------------<  221<H>  4.0   |  25  |  0.49<L>    Ca    8.4      08 Dec 2019 05:55  Phos  2.7     12-08  Mg     1.8     12-08          CAPILLARY BLOOD GLUCOSE      POCT Blood Glucose.: 180 mg/dL (08 Dec 2019 06:08)      RADIOLOGY & ADDITIONAL TESTS: Reviewed. Hospital course:  Pt is an 89M with PMH DMII admitted s/p fall on 11/28. Pt found to have a SAH/SDH hematoma and was admitted to neurosurgery ICU. Pt was initially aphasic but moving all extremities spontaneously. MRI showed hemorrhagic contusion in left inferior temporal lobe and small left subdural hemorrhage with no acute infarction. Failed speech and swallow evaluation with FEES. Was started on NG tube feeds on 11/29. Now admitted to [facility] for aspiration pneumonia, spiked fever to 100.9 on 12/5, started on vanc/zosyn. Given concern for aspiration and dysphagia, PEG was placed on 12/6 and feeds were started after 24hours. He was able to walk with PT/OT. MRSA swab neg for MSRA, vanc discontinued. Patient now stable for stepdown to [facility].     OVERNIGHT EVENTS:  No acute events overnight.    SUBJECTIVE / INTERVAL HPI: Patient seen and examined at bedside. Continuing to cough. Denies any complaints of chest pain, SOB, abdominal pain, N/V/D.    VITAL SIGNS:  Vital Signs Last 24 Hrs  T(C): 36.6 (08 Dec 2019 06:25), Max: 36.7 (08 Dec 2019 02:07)  T(F): 97.9 (08 Dec 2019 06:25), Max: 98.1 (08 Dec 2019 02:07)  HR: 123 (08 Dec 2019 08:54) (94 - 123)  BP: 127/62 (08 Dec 2019 08:25) (108/56 - 127/62)  BP(mean): 89 (08 Dec 2019 08:25) (76 - 89)  RR: 19 (08 Dec 2019 08:54) (17 - 22)  SpO2: 93% (08 Dec 2019 08:54) (91% - 97%)    PHYSICAL EXAM:  General: Frail, cachectic   HEENT: NC/AT, PERRL, anicteric sclera; MMM  Cardiovascular: +S1/S2, RRR  Respiratory: CTA B/L, decreased breath sounds at bases b/l  Gastrointestinal: soft, NT/ND, +BS, PEG tube in place, clean/dry/intact  Extremities: WWP, no edema or cyanosis  Vascular: 2+ radial pulses B/L  Neurological: AAOx3, no focal deficits  Skin: Diffuse ecchymoses, decubitus ulcers over heels, elbows, sacrum    MEDICATIONS:  MEDICATIONS  (STANDING):  ascorbic acid 500 milliGRAM(s) Oral daily  dextrose 5%. 1000 milliLiter(s) (50 mL/Hr) IV Continuous <Continuous>  dextrose 50% Injectable 12.5 Gram(s) IV Push once  furosemide   Injectable 20 milliGRAM(s) IV Push once  insulin glargine Injectable (LANTUS) 16 Unit(s) SubCutaneous at bedtime  insulin lispro (HumaLOG) corrective regimen sliding scale   SubCutaneous every 6 hours  multivitamin 1 Tablet(s) Oral daily  piperacillin/tazobactam IVPB.. 4.5 Gram(s) IV Intermittent every 6 hours  sodium chloride 0.9%. 1000 milliLiter(s) (100 mL/Hr) IV Continuous <Continuous>  valACYclovir 1000 milliGRAM(s) Oral every 8 hours  zinc sulfate 220 milliGRAM(s) Oral daily    MEDICATIONS  (PRN):  acetaminophen    Suspension .. 650 milliGRAM(s) Oral every 6 hours PRN Temp greater or equal to 38C (100.4F), Mild Pain (1 - 3)  dextrose 40% Gel 15 Gram(s) Oral once PRN Blood Glucose LESS THAN 70 milliGRAM(s)/deciliter  glucagon  Injectable 1 milliGRAM(s) IntraMuscular once PRN Glucose LESS THAN 70 milligrams/deciliter      ALLERGIES:  Allergies    No Known Allergies    Intolerances        LABS:                        10.1   8.21  )-----------( 211      ( 08 Dec 2019 05:55 )             33.7     12-08    136  |  104  |  21  ----------------------------<  221<H>  4.0   |  25  |  0.49<L>    Ca    8.4      08 Dec 2019 05:55  Phos  2.7     12-08  Mg     1.8     12-08          CAPILLARY BLOOD GLUCOSE      POCT Blood Glucose.: 180 mg/dL (08 Dec 2019 06:08)      RADIOLOGY & ADDITIONAL TESTS: Reviewed.

## 2019-12-08 NOTE — SWALLOW BEDSIDE ASSESSMENT ADULT - COMMENTS
Pt is known to this SVC. FEES conducted 12/2 revealed consistent aspiration across consistencies w/ copious pooled secretions at rest. Recs were for NPO w/ NGT, allow 2-3 ice chips/hour as tolerated, f/u for reassessment in 2-3 days.
Pt is known to this SVC. FEES conducted 12/2 revealed consistent aspiration across consistencies w/ copious pooled secretions at rest. Recs were for NPO w/ NGT, allow 2-3 ice chips/hour as tolerated, f/u for reassessment in 2-3 days. Pt w/ unchanged clinical presentation. Pt is s/p PEG 12/6.  Per RN, pt w/ improving secretion management w/ intermittent independent expectoration of mucus.

## 2019-12-08 NOTE — SWALLOW BEDSIDE ASSESSMENT ADULT - SLP GENERAL OBSERVATIONS
Pt received asleep in bed. Pt w/ improving expressive and receptive language skills. Some word finding difficulty and reduced auditory comprehension noted. Speech was dysarthric. Pt followed 1-step directives.
Pt received awake and alert in bed. Pt did not reliably follow 1-step directives given models. Speech-language eval to follow.
Pt received awake in bed. Pt speaking fluently to himself in room in foreign language. When asked who he was speaking to, pt pointed to no one and said, "that man over there". RN and MD notified and aware; this has been on-going. Pt also w/ expressive and receptive aphasia.

## 2019-12-08 NOTE — PROGRESS NOTE ADULT - PROBLEM SELECTOR PLAN 1
Pt with cough productive of yellow sputum, spiked temperature 100.9 on 12/4, AM WBC 10.71. CXR with RLL pneumonia consistent with aspiration. MRSA swab neg.  - Continue zosyn (today is day 3)  - NPO with tube feeds through PEG  - Repeat speech and swallow eval

## 2019-12-08 NOTE — PROGRESS NOTE ADULT - PROBLEM SELECTOR PLAN 7
F: None  E: Replete PRN for K<4, Mg<2  N: Glucerna 1.5 @ 55cc/hr  DVT PPx: Hold in setting of recent intracranial bleed  GI PPx: None  Bowel: None  Pain: Tylenol  Code status: Full code  Dispo: [facility]

## 2019-12-08 NOTE — SWALLOW BEDSIDE ASSESSMENT ADULT - SWALLOW EVAL: DIAGNOSIS
Suspect pharyngeal dysphagia given overt s/sx of aspiration w/ thin and nectar thick liquids; however, suspect improvement to swallow function. Continue NPO w/ PEG. This SVC will continue to f/u for assessment of swallow function.

## 2019-12-08 NOTE — PROGRESS NOTE ADULT - ASSESSMENT
Pt is an 89M with PMH DMII admitted s/p fall with traumatic SAH/SDH on 11/28, now admitted to [facility] with aspiration pneumonia and s/p PEG now stable for stepdown to F.

## 2019-12-08 NOTE — SWALLOW BEDSIDE ASSESSMENT ADULT - CONSISTENCIES ADMINISTERED
thin liquid/nectar thick/thin via 1/2 tsp x5; nectar thick via 1/2 tsp x2
thin via tsp x3; nectar via tsp x2/thin liquid/nectar thick
ice chip x2; 1/2 tsp thin liquid x2/thin liquid

## 2019-12-09 LAB
ANION GAP SERPL CALC-SCNC: 8 MMOL/L — SIGNIFICANT CHANGE UP (ref 5–17)
BASOPHILS # BLD AUTO: 0.02 K/UL — SIGNIFICANT CHANGE UP (ref 0–0.2)
BASOPHILS NFR BLD AUTO: 0.3 % — SIGNIFICANT CHANGE UP (ref 0–2)
BUN SERPL-MCNC: 19 MG/DL — SIGNIFICANT CHANGE UP (ref 7–23)
CALCIUM SERPL-MCNC: 8.1 MG/DL — LOW (ref 8.4–10.5)
CHLORIDE SERPL-SCNC: 101 MMOL/L — SIGNIFICANT CHANGE UP (ref 96–108)
CO2 SERPL-SCNC: 29 MMOL/L — SIGNIFICANT CHANGE UP (ref 22–31)
CREAT SERPL-MCNC: 0.46 MG/DL — LOW (ref 0.5–1.3)
EOSINOPHIL # BLD AUTO: 0.07 K/UL — SIGNIFICANT CHANGE UP (ref 0–0.5)
EOSINOPHIL NFR BLD AUTO: 1 % — SIGNIFICANT CHANGE UP (ref 0–6)
GLUCOSE BLDC GLUCOMTR-MCNC: 131 MG/DL — HIGH (ref 70–99)
GLUCOSE BLDC GLUCOMTR-MCNC: 160 MG/DL — HIGH (ref 70–99)
GLUCOSE BLDC GLUCOMTR-MCNC: 174 MG/DL — HIGH (ref 70–99)
GLUCOSE BLDC GLUCOMTR-MCNC: 232 MG/DL — HIGH (ref 70–99)
GLUCOSE BLDC GLUCOMTR-MCNC: 239 MG/DL — HIGH (ref 70–99)
GLUCOSE SERPL-MCNC: 176 MG/DL — HIGH (ref 70–99)
HCT VFR BLD CALC: 33.9 % — LOW (ref 39–50)
HGB BLD-MCNC: 10.4 G/DL — LOW (ref 13–17)
IMM GRANULOCYTES NFR BLD AUTO: 0.4 % — SIGNIFICANT CHANGE UP (ref 0–1.5)
LYMPHOCYTES # BLD AUTO: 0.29 K/UL — LOW (ref 1–3.3)
LYMPHOCYTES # BLD AUTO: 4.2 % — LOW (ref 13–44)
MAGNESIUM SERPL-MCNC: 2 MG/DL — SIGNIFICANT CHANGE UP (ref 1.6–2.6)
MCHC RBC-ENTMCNC: 30.3 PG — SIGNIFICANT CHANGE UP (ref 27–34)
MCHC RBC-ENTMCNC: 30.7 GM/DL — LOW (ref 32–36)
MCV RBC AUTO: 98.8 FL — SIGNIFICANT CHANGE UP (ref 80–100)
MONOCYTES # BLD AUTO: 0.59 K/UL — SIGNIFICANT CHANGE UP (ref 0–0.9)
MONOCYTES NFR BLD AUTO: 8.5 % — SIGNIFICANT CHANGE UP (ref 2–14)
NEUTROPHILS # BLD AUTO: 5.98 K/UL — SIGNIFICANT CHANGE UP (ref 1.8–7.4)
NEUTROPHILS NFR BLD AUTO: 85.6 % — HIGH (ref 43–77)
NRBC # BLD: 0 /100 WBCS — SIGNIFICANT CHANGE UP (ref 0–0)
PLATELET # BLD AUTO: 232 K/UL — SIGNIFICANT CHANGE UP (ref 150–400)
POTASSIUM SERPL-MCNC: 3.4 MMOL/L — LOW (ref 3.5–5.3)
POTASSIUM SERPL-SCNC: 3.4 MMOL/L — LOW (ref 3.5–5.3)
RBC # BLD: 3.43 M/UL — LOW (ref 4.2–5.8)
RBC # FLD: 14.6 % — HIGH (ref 10.3–14.5)
SODIUM SERPL-SCNC: 138 MMOL/L — SIGNIFICANT CHANGE UP (ref 135–145)
WBC # BLD: 6.98 K/UL — SIGNIFICANT CHANGE UP (ref 3.8–10.5)
WBC # FLD AUTO: 6.98 K/UL — SIGNIFICANT CHANGE UP (ref 3.8–10.5)

## 2019-12-09 PROCEDURE — 99233 SBSQ HOSP IP/OBS HIGH 50: CPT | Mod: GC

## 2019-12-09 PROCEDURE — 71045 X-RAY EXAM CHEST 1 VIEW: CPT | Mod: 26

## 2019-12-09 PROCEDURE — 93306 TTE W/DOPPLER COMPLETE: CPT | Mod: 26

## 2019-12-09 PROCEDURE — 99231 SBSQ HOSP IP/OBS SF/LOW 25: CPT

## 2019-12-09 RX ORDER — PIPERACILLIN AND TAZOBACTAM 4; .5 G/20ML; G/20ML
4.5 INJECTION, POWDER, LYOPHILIZED, FOR SOLUTION INTRAVENOUS EVERY 6 HOURS
Refills: 0 | Status: DISCONTINUED | OUTPATIENT
Start: 2019-12-09 | End: 2019-12-11

## 2019-12-09 RX ORDER — POTASSIUM CHLORIDE 20 MEQ
40 PACKET (EA) ORAL
Refills: 0 | Status: COMPLETED | OUTPATIENT
Start: 2019-12-09 | End: 2019-12-09

## 2019-12-09 RX ORDER — ENOXAPARIN SODIUM 100 MG/ML
40 INJECTION SUBCUTANEOUS EVERY 24 HOURS
Refills: 0 | Status: DISCONTINUED | OUTPATIENT
Start: 2019-12-09 | End: 2019-12-16

## 2019-12-09 RX ADMIN — Medication 40 MILLIEQUIVALENT(S): at 12:52

## 2019-12-09 RX ADMIN — VALACYCLOVIR 1000 MILLIGRAM(S): 500 TABLET, FILM COATED ORAL at 21:36

## 2019-12-09 RX ADMIN — Medication 4: at 01:54

## 2019-12-09 RX ADMIN — Medication 2: at 12:52

## 2019-12-09 RX ADMIN — VALACYCLOVIR 1000 MILLIGRAM(S): 500 TABLET, FILM COATED ORAL at 14:14

## 2019-12-09 RX ADMIN — ENOXAPARIN SODIUM 40 MILLIGRAM(S): 100 INJECTION SUBCUTANEOUS at 19:23

## 2019-12-09 RX ADMIN — ZINC SULFATE TAB 220 MG (50 MG ZINC EQUIVALENT) 220 MILLIGRAM(S): 220 (50 ZN) TAB at 12:52

## 2019-12-09 RX ADMIN — PIPERACILLIN AND TAZOBACTAM 200 GRAM(S): 4; .5 INJECTION, POWDER, LYOPHILIZED, FOR SOLUTION INTRAVENOUS at 19:22

## 2019-12-09 RX ADMIN — INSULIN GLARGINE 16 UNIT(S): 100 INJECTION, SOLUTION SUBCUTANEOUS at 21:36

## 2019-12-09 RX ADMIN — PIPERACILLIN AND TAZOBACTAM 200 GRAM(S): 4; .5 INJECTION, POWDER, LYOPHILIZED, FOR SOLUTION INTRAVENOUS at 11:00

## 2019-12-09 RX ADMIN — Medication 1 TABLET(S): at 12:52

## 2019-12-09 RX ADMIN — VALACYCLOVIR 1000 MILLIGRAM(S): 500 TABLET, FILM COATED ORAL at 06:58

## 2019-12-09 RX ADMIN — Medication 2: at 18:51

## 2019-12-09 RX ADMIN — Medication 500 MILLIGRAM(S): at 12:52

## 2019-12-09 RX ADMIN — Medication 40 MILLIEQUIVALENT(S): at 10:54

## 2019-12-09 NOTE — PROGRESS NOTE ADULT - ASSESSMENT
Pt is an 89M with PMH DMII admitted s/p fall with traumatic SAH/SDH on 11/28, now admitted to [facility] with aspiration pneumonia and s/p PEG now stable for stepdown to [facility].

## 2019-12-09 NOTE — PROGRESS NOTE ADULT - SUBJECTIVE AND OBJECTIVE BOX
Hospital course:  Pt is an 89M with PMH DMII admitted s/p fall on 11/28. Pt found to have a SAH/SDH hematoma and was admitted to neurosurgery ICU. Pt was initially aphasic but moving all extremities spontaneously. MRI showed hemorrhagic contusion in left inferior temporal lobe and small left subdural hemorrhage with no acute infarction. Failed speech and swallow evaluation with FEES. Was started on NG tube feeds on 11/29. Now admitted to [facility] for aspiration pneumonia, spiked fever to 100.9 on 12/5, started on vanc/zosyn. Given concern for aspiration and dysphagia, PEG was placed on 12/6 and feeds were started after 24hours. He was able to walk with PT/OT. MRSA swab neg for MSRA, vanc discontinued, on zosyn until 12/10. Patient now stable for stepdown to [facility].     OVERNIGHT EVENTS:  No acute events overnight.    SUBJECTIVE / INTERVAL HPI: Patient seen and examined at bedside. States that his breathing feels improved, wants to get out of bed. Denies any complaints of chest pain, SOB, abdominal pain, N/V/D.    VITAL SIGNS:  Vital Signs Last 24 Hrs  T(C): 36.7 (09 Dec 2019 09:15), Max: 37.3 (09 Dec 2019 05:57)  T(F): 98.1 (09 Dec 2019 09:15), Max: 99.1 (09 Dec 2019 05:57)  HR: 108 (09 Dec 2019 08:25) (98 - 108)  BP: 154/66 (09 Dec 2019 08:25) (127/60 - 169/82)  BP(mean): 95 (09 Dec 2019 08:25) (86 - 100)  RR: 17 (09 Dec 2019 08:25) (17 - 18)  SpO2: 95% (09 Dec 2019 08:25) (93% - 95%)    PHYSICAL EXAM:  General: Frail, cachectic   HEENT: NC/AT, PERRL, anicteric sclera; MMM  Cardiovascular: +S1/S2, RRR  Respiratory: CTA B/L, decreased breath sounds at bases b/l  Gastrointestinal: soft, NT/ND, +BS, PEG tube in place, clean/dry/intact  Extremities: WWP, no edema or cyanosis  Vascular: 2+ radial pulses B/L  Neurological: AAOx3, no focal deficits  Skin: Diffuse ecchymoses, decubitus ulcers over heels, elbows, sacrum    MEDICATIONS:  MEDICATIONS  (STANDING):  ascorbic acid 500 milliGRAM(s) Oral daily  dextrose 5%. 1000 milliLiter(s) (50 mL/Hr) IV Continuous <Continuous>  dextrose 50% Injectable 12.5 Gram(s) IV Push once  insulin glargine Injectable (LANTUS) 16 Unit(s) SubCutaneous at bedtime  insulin lispro (HumaLOG) corrective regimen sliding scale   SubCutaneous every 6 hours  multivitamin 1 Tablet(s) Oral daily  piperacillin/tazobactam IVPB.. 4.5 Gram(s) IV Intermittent every 6 hours  potassium chloride   Powder 40 milliEquivalent(s) Enteral Tube every 2 hours  sodium chloride 0.9%. 1000 milliLiter(s) (100 mL/Hr) IV Continuous <Continuous>  valACYclovir 1000 milliGRAM(s) Oral every 8 hours  zinc sulfate 220 milliGRAM(s) Oral daily    MEDICATIONS  (PRN):  acetaminophen    Suspension .. 650 milliGRAM(s) Oral every 6 hours PRN Temp greater or equal to 38C (100.4F), Mild Pain (1 - 3)  dextrose 40% Gel 15 Gram(s) Oral once PRN Blood Glucose LESS THAN 70 milliGRAM(s)/deciliter  glucagon  Injectable 1 milliGRAM(s) IntraMuscular once PRN Glucose LESS THAN 70 milligrams/deciliter      ALLERGIES:  Allergies    No Known Allergies    Intolerances        LABS:                        10.4   6.98  )-----------( 232      ( 09 Dec 2019 05:41 )             33.9     12-09    138  |  101  |  19  ----------------------------<  176<H>  3.4<L>   |  29  |  0.46<L>    Ca    8.1<L>      09 Dec 2019 05:41  Phos  2.7     12-08  Mg     2.0     12-09          CAPILLARY BLOOD GLUCOSE      POCT Blood Glucose.: 131 mg/dL (09 Dec 2019 07:06)      RADIOLOGY & ADDITIONAL TESTS: Reviewed.

## 2019-12-09 NOTE — PROGRESS NOTE BEHAVIORAL HEALTH - NSBHADMITCOUNSEL_PSY_A_CORE
instructions for management, treatment and follow up/risk factor reduction/client/family/caregiver education/risks and benefits of treatment options/other.../diagnostic results/impressions and/or recommended studies/importance of adherence to chosen treatment/prognosis
instructions for management, treatment and follow up

## 2019-12-09 NOTE — PROGRESS NOTE BEHAVIORAL HEALTH - NSBHFUPINTERVALHXFT_PSY_A_CORE
Patient seen and examined at bedside, chart reviewed. Patient reports the weekend was uneventful. He describes his mood as “perfect because of her” gesturing toward his daughter, and becoming emotional in talking about her and her family. Patient expressed concern about being a burden to his family, though they repeatedly assured him this is not the case. Patient is discharge focused, per daughter the plan is for discharge to acute rehab, followed by sub-acute rehab.  Patient denies visual hallucinations like what he experienced last week, but reports he will occasionally see an image appear for a split second, and it will then disappear in the blink of an eye. Denies auditory hallucinations. Patient able to give long history of every place he has lived when asked if oriented to place. Patient had some difficulty with other orientation questions, reporting month as November, and current president as Cj.

## 2019-12-09 NOTE — PROGRESS NOTE ADULT - SUBJECTIVE AND OBJECTIVE BOX
NP Note Neurosurgery   Sign off note    No Neurosurgical intervention required  Continue current medical management   If any change in Neuro status please reconsult    Discussed with attending

## 2019-12-09 NOTE — PROGRESS NOTE ADULT - PROBLEM SELECTOR PLAN 1
Pt with cough productive of yellow sputum, spiked temperature 100.9 on 12/4, AM WBC 6.98. CXR with RLL pneumonia consistent with aspiration. MRSA swab neg. - Speech and swallow following  - Continue zosyn (today is day 4)  - NPO with tube feeds through PEG

## 2019-12-09 NOTE — PROGRESS NOTE BEHAVIORAL HEALTH - NSBHFUPINTERVALCCFT_PSY_A_CORE
88yo  M w/ no PPH, PMH Diabetes, s/p unwitnessed fall with TBI, Traumatic SAH stable, SDH. Psychiatry consulted for delirium and visual hallucination.

## 2019-12-09 NOTE — PROGRESS NOTE BEHAVIORAL HEALTH - SUMMARY
88yo  M w/ no PPH, PMH Diabetes, s/p unwitnessed fall with TBI, Traumatic SAH stable, SDH. Psychiatry consulted for delirium and visual hallucination. Patient's current presentation is likely delirium secondary to head trauma, although other insidious causes of delirium should be ruled out. Patient has high baseline functioning, biologically there is no psychiatric/substance history which could contribute to his current symptomatology. Psychologically, he is independent, confident, and cooperative and pleasant, was not combative during this interview. He denies depression/manic symptoms and denies paranoid ideation. He has visual hallucination of seeing his sister but denies it during current assessment. Socially, patient has supportive family members and stable housing but may require more assistance as he lives by himself and only recently had a HHA who comes once every week.     Patient not currently on psychiatric medication - seroquel not ordered per primary team.

## 2019-12-09 NOTE — PROGRESS NOTE BEHAVIORAL HEALTH - NSBHCHARTREVIEWVS_PSY_A_CORE FT
Vital Signs Last 24 Hrs  T(C): 36.7 (09 Dec 2019 09:15), Max: 37.3 (09 Dec 2019 05:57)  T(F): 98.1 (09 Dec 2019 09:15), Max: 99.1 (09 Dec 2019 05:57)  HR: 108 (09 Dec 2019 08:25) (98 - 108)  BP: 154/66 (09 Dec 2019 08:25) (127/60 - 154/66)  BP(mean): 95 (09 Dec 2019 08:25) (86 - 100)  RR: 17 (09 Dec 2019 08:25) (17 - 18)  SpO2: 95% (09 Dec 2019 08:25) (93% - 95%)

## 2019-12-09 NOTE — PROGRESS NOTE ADULT - PROBLEM SELECTOR PLAN 7
F: None  E: Replete PRN for K<4, Mg<2  N: Glucerna 1.5 @ 55cc/hr  DVT PPx: Hold in setting of recent intracranial bleed  GI PPx: None  Bowel: None  Pain: Tylenol  Code status: Full code  Dispo: [facility] F: None  E: Replete PRN for K<4, Mg<2  N: Glucerna 1.5 @ 55cc/hr  DVT PPx: Hold in setting of recent intracranial bleed  GI PPx: None  Bowel: None  Pain: Tylenol  Code status: Full code  Dispo: [facility], acute vs subacute rehab pending PT evaluation

## 2019-12-10 DIAGNOSIS — I48.91 UNSPECIFIED ATRIAL FIBRILLATION: ICD-10-CM

## 2019-12-10 DIAGNOSIS — I48.0 PAROXYSMAL ATRIAL FIBRILLATION: ICD-10-CM

## 2019-12-10 LAB
ANION GAP SERPL CALC-SCNC: 9 MMOL/L — SIGNIFICANT CHANGE UP (ref 5–17)
BUN SERPL-MCNC: 17 MG/DL — SIGNIFICANT CHANGE UP (ref 7–23)
CALCIUM SERPL-MCNC: 8.6 MG/DL — SIGNIFICANT CHANGE UP (ref 8.4–10.5)
CHLORIDE SERPL-SCNC: 105 MMOL/L — SIGNIFICANT CHANGE UP (ref 96–108)
CO2 SERPL-SCNC: 24 MMOL/L — SIGNIFICANT CHANGE UP (ref 22–31)
CREAT SERPL-MCNC: 0.45 MG/DL — LOW (ref 0.5–1.3)
GLUCOSE BLDC GLUCOMTR-MCNC: 144 MG/DL — HIGH (ref 70–99)
GLUCOSE BLDC GLUCOMTR-MCNC: 210 MG/DL — HIGH (ref 70–99)
GLUCOSE BLDC GLUCOMTR-MCNC: 253 MG/DL — HIGH (ref 70–99)
GLUCOSE BLDC GLUCOMTR-MCNC: 258 MG/DL — HIGH (ref 70–99)
GLUCOSE SERPL-MCNC: 164 MG/DL — HIGH (ref 70–99)
HCT VFR BLD CALC: 33.1 % — LOW (ref 39–50)
HGB BLD-MCNC: 10.5 G/DL — LOW (ref 13–17)
MAGNESIUM SERPL-MCNC: 2 MG/DL — SIGNIFICANT CHANGE UP (ref 1.6–2.6)
MCHC RBC-ENTMCNC: 31 PG — SIGNIFICANT CHANGE UP (ref 27–34)
MCHC RBC-ENTMCNC: 31.7 GM/DL — LOW (ref 32–36)
MCV RBC AUTO: 97.6 FL — SIGNIFICANT CHANGE UP (ref 80–100)
NRBC # BLD: 0 /100 WBCS — SIGNIFICANT CHANGE UP (ref 0–0)
PLATELET # BLD AUTO: 231 K/UL — SIGNIFICANT CHANGE UP (ref 150–400)
POTASSIUM SERPL-MCNC: 4.6 MMOL/L — SIGNIFICANT CHANGE UP (ref 3.5–5.3)
POTASSIUM SERPL-SCNC: 4.6 MMOL/L — SIGNIFICANT CHANGE UP (ref 3.5–5.3)
RBC # BLD: 3.39 M/UL — LOW (ref 4.2–5.8)
RBC # FLD: 14.7 % — HIGH (ref 10.3–14.5)
SODIUM SERPL-SCNC: 138 MMOL/L — SIGNIFICANT CHANGE UP (ref 135–145)
WBC # BLD: 6.98 K/UL — SIGNIFICANT CHANGE UP (ref 3.8–10.5)
WBC # FLD AUTO: 6.98 K/UL — SIGNIFICANT CHANGE UP (ref 3.8–10.5)

## 2019-12-10 PROCEDURE — 71045 X-RAY EXAM CHEST 1 VIEW: CPT | Mod: 26

## 2019-12-10 PROCEDURE — 99233 SBSQ HOSP IP/OBS HIGH 50: CPT | Mod: GC

## 2019-12-10 RX ORDER — INSULIN HUMAN 100 [IU]/ML
4 INJECTION, SOLUTION SUBCUTANEOUS EVERY 6 HOURS
Refills: 0 | Status: DISCONTINUED | OUTPATIENT
Start: 2019-12-10 | End: 2019-12-10

## 2019-12-10 RX ORDER — METOPROLOL TARTRATE 50 MG
12.5 TABLET ORAL EVERY 12 HOURS
Refills: 0 | Status: DISCONTINUED | OUTPATIENT
Start: 2019-12-10 | End: 2019-12-16

## 2019-12-10 RX ADMIN — Medication 6: at 00:42

## 2019-12-10 RX ADMIN — Medication 500 MILLIGRAM(S): at 11:23

## 2019-12-10 RX ADMIN — Medication 4: at 13:05

## 2019-12-10 RX ADMIN — Medication 12.5 MILLIGRAM(S): at 17:51

## 2019-12-10 RX ADMIN — INSULIN GLARGINE 16 UNIT(S): 100 INJECTION, SOLUTION SUBCUTANEOUS at 22:37

## 2019-12-10 RX ADMIN — ZINC SULFATE TAB 220 MG (50 MG ZINC EQUIVALENT) 220 MILLIGRAM(S): 220 (50 ZN) TAB at 11:23

## 2019-12-10 RX ADMIN — Medication 1 TABLET(S): at 11:23

## 2019-12-10 RX ADMIN — VALACYCLOVIR 1000 MILLIGRAM(S): 500 TABLET, FILM COATED ORAL at 06:20

## 2019-12-10 RX ADMIN — ENOXAPARIN SODIUM 40 MILLIGRAM(S): 100 INJECTION SUBCUTANEOUS at 17:51

## 2019-12-10 RX ADMIN — PIPERACILLIN AND TAZOBACTAM 200 GRAM(S): 4; .5 INJECTION, POWDER, LYOPHILIZED, FOR SOLUTION INTRAVENOUS at 06:20

## 2019-12-10 RX ADMIN — PIPERACILLIN AND TAZOBACTAM 200 GRAM(S): 4; .5 INJECTION, POWDER, LYOPHILIZED, FOR SOLUTION INTRAVENOUS at 17:51

## 2019-12-10 RX ADMIN — Medication 6: at 22:23

## 2019-12-10 RX ADMIN — PIPERACILLIN AND TAZOBACTAM 200 GRAM(S): 4; .5 INJECTION, POWDER, LYOPHILIZED, FOR SOLUTION INTRAVENOUS at 00:42

## 2019-12-10 RX ADMIN — PIPERACILLIN AND TAZOBACTAM 200 GRAM(S): 4; .5 INJECTION, POWDER, LYOPHILIZED, FOR SOLUTION INTRAVENOUS at 11:23

## 2019-12-10 NOTE — PROGRESS NOTE ADULT - PROBLEM SELECTOR PLAN 6
Pt has multiple decubitus ulcers over elbows, heels, sacrum.  - Discontinued Valtrex, given for herpetic sacral rash  - Wound care recs appreciated: antifungal moisture barrier ointment to groin and bilat buttocks, do not cover wounds but keep pt turned with AirTap wedges.

## 2019-12-10 NOTE — PROGRESS NOTE ADULT - PROBLEM SELECTOR PLAN 5
Pt has multiple decubitus ulcers over elbows, heels, sacrum.  - Valtrex for herpetic sacral rash S/p subarachnoid bleed  - Stable per neurosurgery  - Monitor for changes in neurologic status, currently A&Ox3

## 2019-12-10 NOTE — PROGRESS NOTE ADULT - ASSESSMENT
Pt is an 89M with PMH DMII admitted s/p fall with traumatic SAH/SDH on 11/28, now admitted to [facility] with aspiration pneumonia and s/p PEG now stable for stepdown to [facility].

## 2019-12-10 NOTE — PROGRESS NOTE ADULT - PROBLEM SELECTOR PLAN 6
Incidental finding, TSH WNL  - Outpatient follow up Pt has multiple decubitus ulcers over elbows, heels, sacrum.  - Discontinued Valtrex, given for herpetic sacral rash

## 2019-12-10 NOTE — PROGRESS NOTE ADULT - PROBLEM SELECTOR PLAN 3
EKG 12/6 showing AFib with RVR. Unclear if pt has history of AFib.  - Lopressor 12.5 mg today  - Consider increasing dose tomorrow EKG 12/6 showing AFib with RVR. Unclear if pt has history of AFib.  - Lopressor 12.5 mg today  - Consider increasing dose tomorrow  - Obtain collateral from cardiologist EKG 12/6 showing AFib with RVR. Unclear if pt has history of AFib. CHADSVASC score 5   - Lopressor 12.5 mg today  - Consider increasing dose tomorrow  - Obtain collateral from cardiologist

## 2019-12-10 NOTE — SWALLOW FEES ASSESSMENT ADULT - NS SWALLOW FEES REC ASPIR MON
position upright (90Y)/fever/oral hygiene/cough/throat clearing/change of breathing pattern/gurgly voice/pneumonia/upper respiratory infection
position upright (90Y)/upper respiratory infection/oral hygiene/change of breathing pattern/cough/pneumonia/throat clearing/gurgly voice/fever

## 2019-12-10 NOTE — PROGRESS NOTE ADULT - PROBLEM SELECTOR PLAN 3
S/p NG tube, PEG  - crushable meds, feeds through PEG EKG 12/6 showing AFib with RVR. Unclear if pt has history of AFib.  - Lopressor 12.5 mg today  - Consider increasing dose tomorrow

## 2019-12-10 NOTE — PROGRESS NOTE ADULT - PROBLEM SELECTOR PLAN 2
History of DM. A1c 7.0. Sugars have been relatively controlled in 100-200s  - Continue Lantus 16u  - ISS

## 2019-12-10 NOTE — PROGRESS NOTE ADULT - ASSESSMENT
Pt is an 89M with PMH DMII admitted s/p fall with traumatic SAH/SDH on 11/28, now admitted to [facility] with aspiration pneumonia and s/p PEG now stable for stepdown to F.

## 2019-12-10 NOTE — SWALLOW FEES ASSESSMENT ADULT - SLP PERTINENT HISTORY OF CURRENT PROBLEM
Mild TBI w/ small SAH and SDH (L posterior parietooccipital lobe)
s/p fall with traumatic SAH/SDH on 11/28, now with aspiration pneumonia, s/p PEG (12/6)

## 2019-12-10 NOTE — PROGRESS NOTE ADULT - PROBLEM SELECTOR PLAN 1
Pt with cough productive of yellow sputum, spiked temperature 100.9 on 12/4, AM WBC 6.98. CXR with RLL pneumonia consistent with aspiration. MRSA swab neg. - Speech and swallow following  - Final day of zosyn (today is day 5)  - NPO with tube feeds through PEG

## 2019-12-10 NOTE — PROGRESS NOTE ADULT - PROBLEM SELECTOR PLAN 8
1) PCP Contacted on Admission: (Y/N) --> Name & Phone #:  2) Date of Contact with PCP:  3) PCP Contacted at Discharge: (Y/N)  4) Summary of Handoff Given to PCP:   5) Post-Discharge Appointment Date and Location: F: None  E: Replete PRN for K<4, Mg<2  N: Pureed, thin liquid diet  DVT PPx: Hold in setting of recent intracranial bleed  GI PPx: None  Bowel: None  Pain: Tylenol  Code status: Full code  Dispo: [facility], acute rehab pending placement/auth F: None  E: Replete PRN for K<4, Mg<2  N: Pureed, thin liquid diet  DVT PPx: Hold in setting of recent intracranial bleed  GI PPx: None  Bowel: None  Pain: Tylenol  Code status: Full code  Dispo: [facility], received acceptance to [facility] but family wants [facility]

## 2019-12-10 NOTE — PROGRESS NOTE ADULT - PROBLEM SELECTOR PLAN 4
S/p subarachnoid bleed  - Stable per neurosurgery  - Monitor for changes in neurologic status, currently A&Ox3 Passed FEES with speech and swallow  - Pureed, thin liquid diet

## 2019-12-10 NOTE — PROGRESS NOTE ADULT - PROBLEM SELECTOR PLAN 7
F: None  E: Replete PRN for K<4, Mg<2  N: Glucerna 1.5 @ 55cc/hr  DVT PPx: Hold in setting of recent intracranial bleed  GI PPx: None  Bowel: None  Pain: Tylenol  Code status: Full code  Dispo: [facility], acute rehab pending placement/auth Incidental finding, TSH WNL  - Outpatient follow up

## 2019-12-10 NOTE — SWALLOW FEES ASSESSMENT ADULT - DIAGNOSTIC IMPRESSIONS
Pt presents w/ mild-moderate oropharyngeal dysphagia, characterized by delayed pharyngeal swallow initiation resulting in intermittent penetration w/ thin liquids and reduced pharyngeal contraction resulting in pharyngeal residue w/ penetration of soft solids after the swallow. Residue was moderate w/ more viscous consistencies (mech soft solids) vs mild w/ liquid, and confounded by baseline pooled secretions. Penetrate was effectively cleared w/ subsequent swallow. Residue was significantly diminished w/ use of liquid wash. Recs to initiate a PO diet w/ aspiration precautions and the below swallow techniques/strategies.

## 2019-12-10 NOTE — PROGRESS NOTE ADULT - SUBJECTIVE AND OBJECTIVE BOX
Transfer Acceptance Note from [facility] to [facility]  Hospital course:  Pt is an 89M with PMH DMII admitted s/p fall on 11/28. Pt found to have a SAH/SDH hematoma and was admitted to neurosurgery ICU. Pt was initially aphasic but moving all extremities spontaneously. MRI showed hemorrhagic contusion in left inferior temporal lobe and small left subdural hemorrhage with no acute infarction. Failed speech and swallow evaluation with FEES. Was started on NG tube feeds on 11/29. Now admitted to [facility] for aspiration pneumonia, spiked fever to 100.9 on 12/5, started on vanc/zosyn. Given concern for aspiration and dysphagia, PEG was placed on 12/6 and feeds were started after 24hours. He was able to walk with PT/OT. MRSA swab neg for MSRA, vanc discontinued, on zosyn completed today (12/10). FEES recs puree diet, d/c tube feeds on 12/10. [place] for [place] to [facility].     INTERVAL HPI/OVERNIGHT EVENTS:  Patient was seen and examined at bedside. As per nurse and patient, no o/n events, patient resting comfortably. No complaints at this time. Patient denies: fever, chills, dizziness, weakness, HA, Changes in vision, CP, palpitations, SOB, cough, N/V/D/C, dysuria, changes in bowel movements, LE edema. ROS otherwise negative.    VITAL SIGNS:  T(F): 98.2 (12-10-19 @ 13:35)  HR: 112 (12-10-19 @ 16:10)  BP: 125/58 (12-10-19 @ 16:10)  RR: 18 (12-10-19 @ 16:10)  SpO2: 94% (12-10-19 @ 16:10)  Wt(kg): --      12-09-19 @ 07:01  -  12-10-19 @ 07:00  --------------------------------------------------------  IN: 1860 mL / OUT: 1200 mL / NET: 660 mL    PHYSICAL EXAM:  General: Frail, cachectic   HEENT: NC/AT, PERRL, anicteric sclera; MMM  Cardiovascular: +S1/S2, RRR  Respiratory: CTA B/L  Gastrointestinal: soft, NT/ND, +BS, PEG tube in place, clean/dry/intact  Extremities: WWP, no edema or cyanosis  Vascular: 2+ radial pulses B/L  Neurological: AAOx3, no focal deficits  Skin: Diffuse ecchymoses, decubitus ulcers over heels, elbows, sacrum    MEDICATIONS  (STANDING):  ascorbic acid 500 milliGRAM(s) Oral daily  dextrose 5%. 1000 milliLiter(s) (50 mL/Hr) IV Continuous <Continuous>  dextrose 50% Injectable 12.5 Gram(s) IV Push once  enoxaparin Injectable 40 milliGRAM(s) SubCutaneous every 24 hours  insulin glargine Injectable (LANTUS) 16 Unit(s) SubCutaneous at bedtime  insulin lispro (HumaLOG) corrective regimen sliding scale   SubCutaneous every 6 hours  metoprolol tartrate 12.5 milliGRAM(s) Oral every 12 hours  multivitamin 1 Tablet(s) Oral daily  piperacillin/tazobactam IVPB.. 4.5 Gram(s) IV Intermittent every 6 hours  zinc sulfate 220 milliGRAM(s) Oral daily    MEDICATIONS  (PRN):  acetaminophen    Suspension .. 650 milliGRAM(s) Oral every 6 hours PRN Temp greater or equal to 38C (100.4F), Mild Pain (1 - 3)  dextrose 40% Gel 15 Gram(s) Oral once PRN Blood Glucose LESS THAN 70 milliGRAM(s)/deciliter  glucagon  Injectable 1 milliGRAM(s) IntraMuscular once PRN Glucose LESS THAN 70 milligrams/deciliter      Allergies    No Known Allergies    Intolerances        LABS:                        10.5   6.98  )-----------( 231      ( 10 Dec 2019 06:22 )             33.1     12-10    138  |  105  |  17  ----------------------------<  164<H>  4.6   |  24  |  0.45<L>    Ca    8.6      10 Dec 2019 06:22  Mg     2.0     12-10            RADIOLOGY & ADDITIONAL TESTS:  Reviewed

## 2019-12-10 NOTE — SWALLOW FEES ASSESSMENT ADULT - PHARYNGEAL PHASE COMMENTS
Delayed pharyngeal swallow trigger w/ bolus head pooled in pyriforms, resulting in visible deep penetration of thin liquids to vocal folds w/ suspected aspiration (triggered cough/throat clear). Weak pharyngeal contraction resulted in diffuse pharyngeal stasis in valleculae, lateral channels, pyriforms, and interarytenoid space. Residue from small/controlled boli mixed with pooled secretions. Spillage of residue into the airway occurred from the interarytenoid space, resulting in deep penetration and aspiration after the swallow. Pt was inconsistently sensate to penetrate/aspirate. Pt did not reliably follow directives/cues to clear throat/cough or dry swallow.
Delayed pharyngeal swallow trigger w/ thin and nectar thick liquids w/ bolus in hypopharynx resulted in intermittent trace penetration of thin liquids. Penetrate was effectively cleared w/ subsequent swallow. Reduced pharyngeal contraction resulted in pharyngeal residue in the valleculae, lateral channels, pyriforms, and interarytenoid space, moderate with mech soft solids, mild-mod w/ puree, and mild w/ thin liquids. Amount of residue was further confounded by baseline pooled secretions. Following mech soft trial x1, build-up of residue spilled from the interarytenoid space to the airway, above the level of the vocal folds. Pharyngeal stasis was significantly diminished given liquid wash and w/ subsequent swallows. Please note: no signs of aspiration were noted during the eval; however, pt presented w/ frequent throat clearing. Potentially due to sensitivity to pharyngeal residue or to baseline excess mucus. Pt w/ throat clearing/cough and expectoration of mucus at rest.

## 2019-12-10 NOTE — SWALLOW FEES ASSESSMENT ADULT - RECOMMENDED FEEDING/EATING TECHNIQUES
oral hygiene/position upright (90 degrees)/small sips/bites/allow for swallow between intakes/crush medication (when feasible)/no straws/alternate food with liquid/maintain upright posture during/after eating for 30 mins/***take a sip of liquid after EACH bite; one bite/sip at a time

## 2019-12-10 NOTE — PROGRESS NOTE ADULT - PROBLEM SELECTOR PLAN 1
Pt with cough productive of yellow sputum, spiked temperature 100.9 on 12/4, AM WBC 6.98. CXR with RLL pneumonia consistent with aspiration. MRSA swab neg. - Speech and swallow following  - Final day of zosyn (today is day 5)  - D/c tube feeds, FEES recommendation puree diet

## 2019-12-10 NOTE — PROGRESS NOTE ADULT - SUBJECTIVE AND OBJECTIVE BOX
OVERNIGHT EVENTS:  No acute events overnight.    SUBJECTIVE / INTERVAL HPI: Patient seen and examined at bedside. Pt states that he is coughing less and wants to get his strength back. Denies any complaints of chest pain, SOB, abdominal pain, N/V/D.    VITAL SIGNS:  Vital Signs Last 24 Hrs  T(C): 36.4 (10 Dec 2019 06:00), Max: 37.6 (09 Dec 2019 13:41)  T(F): 97.6 (10 Dec 2019 06:00), Max: 99.6 (09 Dec 2019 13:41)  HR: 96 (10 Dec 2019 04:12) (96 - 112)  BP: 118/56 (10 Dec 2019 04:12) (118/56 - 154/66)  BP(mean): 80 (10 Dec 2019 04:12) (80 - 95)  RR: 18 (10 Dec 2019 04:12) (17 - 18)  SpO2: 93% (10 Dec 2019 04:12) (90% - 95%)    PHYSICAL EXAM:  General: Frail, cachectic   HEENT: NC/AT, PERRL, anicteric sclera; MMM  Cardiovascular: +S1/S2, RRR  Respiratory: CTA B/L  Gastrointestinal: soft, NT/ND, +BS, PEG tube in place, clean/dry/intact  Extremities: WWP, no edema or cyanosis  Vascular: 2+ radial pulses B/L  Neurological: AAOx3, no focal deficits  Skin: Diffuse ecchymoses, decubitus ulcers over heels, elbows, sacrum    MEDICATIONS:  MEDICATIONS  (STANDING):  ascorbic acid 500 milliGRAM(s) Oral daily  dextrose 5%. 1000 milliLiter(s) (50 mL/Hr) IV Continuous <Continuous>  dextrose 50% Injectable 12.5 Gram(s) IV Push once  enoxaparin Injectable 40 milliGRAM(s) SubCutaneous every 24 hours  insulin glargine Injectable (LANTUS) 16 Unit(s) SubCutaneous at bedtime  insulin lispro (HumaLOG) corrective regimen sliding scale   SubCutaneous every 6 hours  multivitamin 1 Tablet(s) Oral daily  piperacillin/tazobactam IVPB.. 4.5 Gram(s) IV Intermittent every 6 hours  sodium chloride 0.9%. 1000 milliLiter(s) (100 mL/Hr) IV Continuous <Continuous>  valACYclovir 1000 milliGRAM(s) Oral every 8 hours  zinc sulfate 220 milliGRAM(s) Oral daily    MEDICATIONS  (PRN):  acetaminophen    Suspension .. 650 milliGRAM(s) Oral every 6 hours PRN Temp greater or equal to 38C (100.4F), Mild Pain (1 - 3)  dextrose 40% Gel 15 Gram(s) Oral once PRN Blood Glucose LESS THAN 70 milliGRAM(s)/deciliter  glucagon  Injectable 1 milliGRAM(s) IntraMuscular once PRN Glucose LESS THAN 70 milligrams/deciliter      ALLERGIES:  Allergies    No Known Allergies    Intolerances        LABS:                        10.5   6.98  )-----------( 231      ( 10 Dec 2019 06:22 )             33.1     12-10    138  |  105  |  17  ----------------------------<  164<H>  4.6   |  24  |  0.45<L>    Ca    8.6      10 Dec 2019 06:22  Mg     2.0     12-10          CAPILLARY BLOOD GLUCOSE      POCT Blood Glucose.: 144 mg/dL (10 Dec 2019 06:15)      RADIOLOGY & ADDITIONAL TESTS: Reviewed. Hospital course:  Pt is an 89M with PMH DMII admitted s/p fall on 11/28. Pt found to have a SAH/SDH hematoma and was admitted to neurosurgery ICU. Pt was initially aphasic but moving all extremities spontaneously. MRI showed hemorrhagic contusion in left inferior temporal lobe and small left subdural hemorrhage with no acute infarction. Failed speech and swallow evaluation with FEES. Was started on NG tube feeds on 11/29. Now admitted to [facility] for aspiration pneumonia, spiked fever to 100.9 on 12/5, started on vanc/zosyn. Given concern for aspiration and dysphagia, PEG was placed on 12/6 and feeds were started after 24hours. He was able to walk with PT/OT. MRSA swab neg for MSRA, vanc discontinued, on zosyn completed today (12/10). Patient now stable for stepdown to [facility].     OVERNIGHT EVENTS:  No acute events overnight.    SUBJECTIVE / INTERVAL HPI: Patient seen and examined at bedside. Pt states that he is coughing less and wants to get his strength back. Denies any complaints of chest pain, SOB, abdominal pain, N/V/D.    VITAL SIGNS:  Vital Signs Last 24 Hrs  T(C): 36.4 (10 Dec 2019 06:00), Max: 37.6 (09 Dec 2019 13:41)  T(F): 97.6 (10 Dec 2019 06:00), Max: 99.6 (09 Dec 2019 13:41)  HR: 96 (10 Dec 2019 04:12) (96 - 112)  BP: 118/56 (10 Dec 2019 04:12) (118/56 - 154/66)  BP(mean): 80 (10 Dec 2019 04:12) (80 - 95)  RR: 18 (10 Dec 2019 04:12) (17 - 18)  SpO2: 93% (10 Dec 2019 04:12) (90% - 95%)    PHYSICAL EXAM:  General: Frail, cachectic   HEENT: NC/AT, PERRL, anicteric sclera; MMM  Cardiovascular: +S1/S2, RRR  Respiratory: CTA B/L  Gastrointestinal: soft, NT/ND, +BS, PEG tube in place, clean/dry/intact  Extremities: WWP, no edema or cyanosis  Vascular: 2+ radial pulses B/L  Neurological: AAOx3, no focal deficits  Skin: Diffuse ecchymoses, decubitus ulcers over heels, elbows, sacrum    MEDICATIONS:  MEDICATIONS  (STANDING):  ascorbic acid 500 milliGRAM(s) Oral daily  dextrose 5%. 1000 milliLiter(s) (50 mL/Hr) IV Continuous <Continuous>  dextrose 50% Injectable 12.5 Gram(s) IV Push once  enoxaparin Injectable 40 milliGRAM(s) SubCutaneous every 24 hours  insulin glargine Injectable (LANTUS) 16 Unit(s) SubCutaneous at bedtime  insulin lispro (HumaLOG) corrective regimen sliding scale   SubCutaneous every 6 hours  multivitamin 1 Tablet(s) Oral daily  piperacillin/tazobactam IVPB.. 4.5 Gram(s) IV Intermittent every 6 hours  sodium chloride 0.9%. 1000 milliLiter(s) (100 mL/Hr) IV Continuous <Continuous>  valACYclovir 1000 milliGRAM(s) Oral every 8 hours  zinc sulfate 220 milliGRAM(s) Oral daily    MEDICATIONS  (PRN):  acetaminophen    Suspension .. 650 milliGRAM(s) Oral every 6 hours PRN Temp greater or equal to 38C (100.4F), Mild Pain (1 - 3)  dextrose 40% Gel 15 Gram(s) Oral once PRN Blood Glucose LESS THAN 70 milliGRAM(s)/deciliter  glucagon  Injectable 1 milliGRAM(s) IntraMuscular once PRN Glucose LESS THAN 70 milligrams/deciliter      ALLERGIES:  Allergies    No Known Allergies    Intolerances        LABS:                        10.5   6.98  )-----------( 231      ( 10 Dec 2019 06:22 )             33.1     12-10    138  |  105  |  17  ----------------------------<  164<H>  4.6   |  24  |  0.45<L>    Ca    8.6      10 Dec 2019 06:22  Mg     2.0     12-10          CAPILLARY BLOOD GLUCOSE      POCT Blood Glucose.: 144 mg/dL (10 Dec 2019 06:15)      RADIOLOGY & ADDITIONAL TESTS: Reviewed.

## 2019-12-10 NOTE — PROGRESS NOTE ADULT - PROBLEM SELECTOR PLAN 8
F: None  E: Replete PRN for K<4, Mg<2  N: Pureed, thin liquid diet  DVT PPx: Hold in setting of recent intracranial bleed  GI PPx: None  Bowel: None  Pain: Tylenol  Code status: Full code  Dispo: [facility], received acceptance to [facility] but family wants [facility]

## 2019-12-10 NOTE — SWALLOW FEES ASSESSMENT ADULT - SPECIFY REASON(S)
To assess swallow anatomy and physiology
To reassess swallow anatomy and physiology in setting of improved clinical swallow presentation at bedside

## 2019-12-10 NOTE — SWALLOW FEES ASSESSMENT ADULT - COMMENTS
Copious pooled secretions in the hypopharynx at baseline, though improved from prior FEES. No observed penetration or aspiration of secretions at baseline. Initial FEES conducted 12/2: "Pt presents w/ moderate oropharyngeal dysphagia, characterized by delayed pharyngeal swallow trigger and mistimed airway protection resulting in penetration and aspiration of thin liquids during the swallow, and reduced pharyngeal contraction resulting in residue w/ penetration and aspiration across consistencies after the swallow. Pt w/ baseline penetration and aspiration of secretions. Given pt is at high risk for aspiration, a PO diet is premature at this time." Pt was kept NPO w/ NGT; PEG placed 12/6.    Education re purpose, benefit, and risk of FEES was provided to pt and pt's daughter at bedside. Pt agreed to the eval. Pt tolerated the passing of the scope.

## 2019-12-11 ENCOUNTER — TRANSCRIPTION ENCOUNTER (OUTPATIENT)
Age: 84
End: 2019-12-11

## 2019-12-11 LAB
ANION GAP SERPL CALC-SCNC: 10 MMOL/L — SIGNIFICANT CHANGE UP (ref 5–17)
BUN SERPL-MCNC: 11 MG/DL — SIGNIFICANT CHANGE UP (ref 7–23)
CALCIUM SERPL-MCNC: 8.4 MG/DL — SIGNIFICANT CHANGE UP (ref 8.4–10.5)
CHLORIDE SERPL-SCNC: 98 MMOL/L — SIGNIFICANT CHANGE UP (ref 96–108)
CO2 SERPL-SCNC: 25 MMOL/L — SIGNIFICANT CHANGE UP (ref 22–31)
CREAT SERPL-MCNC: 0.5 MG/DL — SIGNIFICANT CHANGE UP (ref 0.5–1.3)
GLUCOSE BLDC GLUCOMTR-MCNC: 150 MG/DL — HIGH (ref 70–99)
GLUCOSE BLDC GLUCOMTR-MCNC: 226 MG/DL — HIGH (ref 70–99)
GLUCOSE BLDC GLUCOMTR-MCNC: 234 MG/DL — HIGH (ref 70–99)
GLUCOSE BLDC GLUCOMTR-MCNC: 62 MG/DL — LOW (ref 70–99)
GLUCOSE SERPL-MCNC: 64 MG/DL — LOW (ref 70–99)
HCT VFR BLD CALC: 37.1 % — LOW (ref 39–50)
HGB BLD-MCNC: 11.4 G/DL — LOW (ref 13–17)
MAGNESIUM SERPL-MCNC: 2 MG/DL — SIGNIFICANT CHANGE UP (ref 1.6–2.6)
MCHC RBC-ENTMCNC: 30.3 PG — SIGNIFICANT CHANGE UP (ref 27–34)
MCHC RBC-ENTMCNC: 30.7 GM/DL — LOW (ref 32–36)
MCV RBC AUTO: 98.7 FL — SIGNIFICANT CHANGE UP (ref 80–100)
NRBC # BLD: 0 /100 WBCS — SIGNIFICANT CHANGE UP (ref 0–0)
PLATELET # BLD AUTO: 339 K/UL — SIGNIFICANT CHANGE UP (ref 150–400)
POTASSIUM SERPL-MCNC: 4.1 MMOL/L — SIGNIFICANT CHANGE UP (ref 3.5–5.3)
POTASSIUM SERPL-SCNC: 4.1 MMOL/L — SIGNIFICANT CHANGE UP (ref 3.5–5.3)
RBC # BLD: 3.76 M/UL — LOW (ref 4.2–5.8)
RBC # FLD: 14.6 % — HIGH (ref 10.3–14.5)
SODIUM SERPL-SCNC: 133 MMOL/L — LOW (ref 135–145)
WBC # BLD: 5.94 K/UL — SIGNIFICANT CHANGE UP (ref 3.8–10.5)
WBC # FLD AUTO: 5.94 K/UL — SIGNIFICANT CHANGE UP (ref 3.8–10.5)

## 2019-12-11 PROCEDURE — 99232 SBSQ HOSP IP/OBS MODERATE 35: CPT | Mod: GC

## 2019-12-11 RX ORDER — INSULIN GLARGINE 100 [IU]/ML
16 INJECTION, SOLUTION SUBCUTANEOUS AT BEDTIME
Refills: 0 | Status: DISCONTINUED | OUTPATIENT
Start: 2019-12-11 | End: 2019-12-13

## 2019-12-11 RX ADMIN — Medication 12.5 MILLIGRAM(S): at 17:38

## 2019-12-11 RX ADMIN — Medication 4: at 17:37

## 2019-12-11 RX ADMIN — Medication 500 MILLIGRAM(S): at 11:48

## 2019-12-11 RX ADMIN — ZINC SULFATE TAB 220 MG (50 MG ZINC EQUIVALENT) 220 MILLIGRAM(S): 220 (50 ZN) TAB at 11:48

## 2019-12-11 RX ADMIN — Medication 1 TABLET(S): at 11:48

## 2019-12-11 RX ADMIN — Medication 12.5 MILLIGRAM(S): at 06:50

## 2019-12-11 RX ADMIN — PIPERACILLIN AND TAZOBACTAM 200 GRAM(S): 4; .5 INJECTION, POWDER, LYOPHILIZED, FOR SOLUTION INTRAVENOUS at 11:48

## 2019-12-11 RX ADMIN — PIPERACILLIN AND TAZOBACTAM 200 GRAM(S): 4; .5 INJECTION, POWDER, LYOPHILIZED, FOR SOLUTION INTRAVENOUS at 06:50

## 2019-12-11 RX ADMIN — PIPERACILLIN AND TAZOBACTAM 200 GRAM(S): 4; .5 INJECTION, POWDER, LYOPHILIZED, FOR SOLUTION INTRAVENOUS at 00:05

## 2019-12-11 RX ADMIN — ENOXAPARIN SODIUM 40 MILLIGRAM(S): 100 INJECTION SUBCUTANEOUS at 17:56

## 2019-12-11 RX ADMIN — INSULIN GLARGINE 16 UNIT(S): 100 INJECTION, SOLUTION SUBCUTANEOUS at 22:00

## 2019-12-11 NOTE — PROGRESS NOTE ADULT - PROBLEM SELECTOR PLAN 4
Passed FEES with speech and swallow  - Pureed, thin liquid diet Passed FEES with speech and swallow  - Pureed, thin liquid diet, tolerating as above

## 2019-12-11 NOTE — PROGRESS NOTE ADULT - PROBLEM SELECTOR PLAN 1
Pt with cough productive of yellow sputum, spiked temperature 100.9 on 12/4, AM WBC 6.98. CXR with RLL pneumonia consistent with aspiration. MRSA swab neg. - Speech and swallow following  - Final day of zosyn (today is day 5)  - D/c tube feeds, FEES recommendation puree diet Pt with cough productive of yellow sputum, spiked temperature 100.9 on 12/4, AM WBC 6.98. CXR with RLL pneumonia consistent with aspiration. MRSA swab neg. - Speech and swallow following  - S/p 7 days of Zosyn  - D/c tube feeds, FEES recommendation puree diet. Tolerating po diet today

## 2019-12-11 NOTE — ADVANCED PRACTICE NURSE CONSULT - ASSESSMENT
Pt is an 89M with PMH DMII admitted s/p fall with traumatic SAH/SDH on 11/28, now admitted to [facility] with aspiration pneumonia and s/p PEG. Pt admitted with the following pressure injuries: Stage 3 to sacrum measuring 1x1 cm with scattered subcutaneous tissue visible, 2 Stage 2 pressure injuries to right buttock, both measuring approx 0.5x0.5 cm, and an additional stage 2 to coccyx measuring approx 1x1 cm. Pt on AirTap with wedges for turning and repositioning, Z-panfilo boots obtained and placed on patient. Fungal rash scattered over bilat buttocks and posterior upper legs.

## 2019-12-11 NOTE — DISCHARGE NOTE PROVIDER - NSDCCPCAREPLAN_GEN_ALL_CORE_FT
PRINCIPAL DISCHARGE DIAGNOSIS  Diagnosis: Subarachnoid bleed  Assessment and Plan of Treatment: You presented after a mechanical fall at home and were found to have a bleed in your brain. You were monitored by the neurosurgical team and you are now stable for discharge to rehab. Please follow up with???      SECONDARY DISCHARGE DIAGNOSES  Diagnosis: Thyroid nodule  Assessment and Plan of Treatment: You were found to have an incidental thyroid nodule. Your thyroid hormone level is normal. Please follow up outpatient with your primary care provider for this.    Diagnosis: Decubitus ulcers  Assessment and Plan of Treatment: You were found to have multiple wounds called decubitus ulcers and a fungal infection inyour groin and buttocks. The wound care team was consulted and medication was applied to help with the healing.    Diagnosis: Dysphagia  Assessment and Plan of Treatment: You initially had a lot of difficulty swallowing. A PEG tube was placed to help feed you. You everntually passed the swallowing study and now you are on a pureed diet. Please continue this diet. You may follow up with GI for the PEG tube so that it can be removed in the near future.    Diagnosis: Atrial fibrillation  Assessment and Plan of Treatment: You were found to have an abnormal heart rhythm called atrial fibrillation. We started you on a medication called Lopressor to help keep your heart rate down which will help control the arrythmia.    Diagnosis: Diabetes  Assessment and Plan of Treatment: Your blood glucose levels have been monitored and you were given Insulin.    Diagnosis: Pneumonia  Assessment and Plan of Treatment: You were found to have pneumonia after you became febrile and developed a cough. This likely happened because when you had diffiulty swallowing you aspirated (swallowed food/liquids into the lungs). You were treated with antibitoics and completed a 7 day course. PRINCIPAL DISCHARGE DIAGNOSIS  Diagnosis: Subarachnoid bleed  Assessment and Plan of Treatment: You presented after a mechanical fall at home and were found to have a bleed in your brain. You were monitored by the neurosurgical team and you are now stable for discharge to rehab. Please follow up with Dr. Morin, neurosurgery.      SECONDARY DISCHARGE DIAGNOSES  Diagnosis: Thyroid nodule  Assessment and Plan of Treatment: You were found to have an incidental thyroid nodule. Your thyroid hormone level is normal. Please follow up outpatient with your primary care provider for this.    Diagnosis: Decubitus ulcers  Assessment and Plan of Treatment: You were found to have multiple wounds called decubitus ulcers and a fungal infection inyour groin and buttocks. The wound care team was consulted and medication was applied to help with the healing.    Diagnosis: Dysphagia  Assessment and Plan of Treatment: You initially had a lot of difficulty swallowing. A PEG tube was placed to help feed you. You everntually passed the swallowing study and now you are on a pureed diet. Please continue this diet. Interventional radiology placed the peg tube. You may follow up with GI for the PEG tube so that it can be removed in the near future.    Diagnosis: Atrial fibrillation  Assessment and Plan of Treatment: You were found to have an abnormal heart rhythm called atrial fibrillation. We started you on a medication called Lopressor 12.5 mg q12h to help keep your heart rate down which will help control the arrythmia.    Diagnosis: Diabetes  Assessment and Plan of Treatment: Your blood glucose levels have been monitored and you were given Insulin.    Diagnosis: Pneumonia  Assessment and Plan of Treatment: You were found to have pneumonia after you became febrile and developed a cough. This likely happened because when you had diffiulty swallowing you aspirated (swallowed food/liquids into the lungs). You were treated with antibitoics and completed a 7 day course. PRINCIPAL DISCHARGE DIAGNOSIS  Diagnosis: Subarachnoid bleed  Assessment and Plan of Treatment: You presented after a mechanical fall at home and were found to have a bleed in your brain. You were monitored by the neurosurgical team and you are now stable for discharge to rehab. Please follow up with Dr. Morin, neurosurgery.      SECONDARY DISCHARGE DIAGNOSES  Diagnosis: Thyroid nodule  Assessment and Plan of Treatment: You were found to have an incidental thyroid nodule. Your thyroid hormone level is normal. Please follow up outpatient with your primary care provider, Dr. Elliott for this.    Diagnosis: Decubitus ulcers  Assessment and Plan of Treatment: You were found to have multiple wounds called decubitus ulcers and a fungal infection in your groin and buttocks. The wound care team was consulted and medication was applied to help with the healing.    Diagnosis: Dysphagia  Assessment and Plan of Treatment: You initially had a lot of difficulty swallowing. A PEG tube was placed to help feed you. You eventually passed the swallowing study and now you are on a pureed diet. Please continue this diet. Interventional radiology placed the peg tube. Please follow up with Dr. Elliott for the PEG tube so that it can be removed in the near future.    Diagnosis: Atrial fibrillation  Assessment and Plan of Treatment: You were found to have an irregular heart rhythm called atrial fibrillation. We started you on a medication called Lopressor 12.5 mg q12h to help keep your heart rate down which will help control the arrythmia. Because you had a bleed in your brain, anticoagulation/blood thinner was held at the time. Please follow up with neurosurgery on your scheduled appointment on [facility] 13, 2020 with Dr. Morin about resuming Eliquis (anticoagulation). It should be started a month of your initial admission on 11/28/19    Diagnosis: Diabetes  Assessment and Plan of Treatment: Your blood glucose levels have been monitored and you were given a basal dose of insulin and inuslin on a sliding scale based on your blood sugar finger sticks.    Diagnosis: Pneumonia  Assessment and Plan of Treatment: You were found to have pneumonia after you became febrile and developed a cough. This likely happened because when you had diffiulty swallowing you aspirated (swallowed food/liquids into the lungs). You were treated with antibitoics and completed a 7 day course. PRINCIPAL DISCHARGE DIAGNOSIS  Diagnosis: Subarachnoid bleed  Assessment and Plan of Treatment: You presented after a mechanical fall at home and were found to have a bleed in your brain. You were monitored by the neurosurgical team and you are now stable for discharge to rehab. Please follow up with Dr. Morin, neurosurgery.      SECONDARY DISCHARGE DIAGNOSES  Diagnosis: Thyroid nodule  Assessment and Plan of Treatment: You were found to have an incidental thyroid nodule. Your thyroid hormone level is normal. Please follow up outpatient with your primary care provider, Dr. Elliott for this.    Diagnosis: Decubitus ulcers  Assessment and Plan of Treatment: You were found to have multiple wounds called decubitus ulcers and a fungal infection in your groin and buttocks. The wound care team was consulted and medication was applied to help with the healing.    Diagnosis: Dysphagia  Assessment and Plan of Treatment: You initially had a lot of difficulty swallowing. A PEG tube was placed to help feed you. You eventually passed the swallowing study and now you are on a pureed diet. Please continue this diet. Interventional radiology placed the peg tube. Please follow up with Dr. Elliott for the PEG tube so that it can be removed in the near future.    Diagnosis: Atrial fibrillation  Assessment and Plan of Treatment: You were found to have an irregular heart rhythm called atrial fibrillation. We started you on a medication called Lopressor 12.5 mg q12h to help keep your heart rate down which will help control the arrythmia. Because you had a bleed in your brain, anticoagulation/blood thinner was held at the time. Please follow up with neurosurgery on your scheduled appointment on [facility], 2020 with Dr. Morin about starting Eliquis (anticoagulation). It should be started a month from your initial admission on 11/28/19.    Diagnosis: Diabetes  Assessment and Plan of Treatment: Your blood glucose levels have been monitored and you were given a basal dose of insulin and inuslin on a sliding scale based on your blood sugar finger sticks.    Diagnosis: Pneumonia  Assessment and Plan of Treatment: You were found to have pneumonia after you became febrile and developed a cough. This likely happened because when you had diffiulty swallowing you aspirated (swallowed food/liquids into the lungs). You were treated with antibitoics and completed a 7 day course. PRINCIPAL DISCHARGE DIAGNOSIS  Diagnosis: Subarachnoid bleed  Assessment and Plan of Treatment: You presented after a mechanical fall at home and were found to have a bleed in your brain. You were monitored by the neurosurgical team and you are now stable for discharge to rehab. Please follow up with Dr. Morin, neurosurgery.      SECONDARY DISCHARGE DIAGNOSES  Diagnosis: Thyroid nodule  Assessment and Plan of Treatment: You were found to have an incidental thyroid nodule. Your thyroid hormone level is normal. Please follow up outpatient with your primary care provider, Dr. Elliott for this.    Diagnosis: Decubitus ulcers  Assessment and Plan of Treatment: You were found to have multiple wounds called decubitus ulcers and a fungal infection in your groin and buttocks. The wound care team was consulted and medication was applied to help with the healing. Nursing instructions are as follows: antifungal moisture barrier ointment to groin and bilateral buttocks, do not cover wounds but keep pt turned with AirTap wedges    Diagnosis: Dysphagia  Assessment and Plan of Treatment: You initially had a lot of difficulty swallowing. A PEG tube was placed to help feed you. You eventually passed the swallowing study and now you are on a pureed diet. Please continue this diet. Interventional radiology placed the peg tube. Please follow up with Dr. Elliott for the PEG tube so that it can be removed in the near future.    Diagnosis: Atrial fibrillation  Assessment and Plan of Treatment: You were found to have an irregular heart rhythm called atrial fibrillation. We started you on a medication called Lopressor 12.5 mg q12h to help keep your heart rate down which will help control the arrythmia. Because you had a bleed in your brain, anticoagulation/blood thinner was held at the time. Please follow up with neurosurgery [facility]. Morin about starting Eliquis (anticoagulation). It should be started a month from your initial admission on 11/28/19.    Diagnosis: Diabetes  Assessment and Plan of Treatment: Your blood glucose levels have been monitored and you were given a basal dose of insulin and inuslin on a sliding scale based on your blood sugar finger sticks.    Diagnosis: Pneumonia  Assessment and Plan of Treatment: You were found to have pneumonia after you became febrile and developed a cough. This likely happened because when you had diffiulty swallowing you aspirated (swallowed food/liquids into the lungs). You were treated with antibitoics and completed a 7 day course.

## 2019-12-11 NOTE — DISCHARGE NOTE PROVIDER - PROVIDER TOKENS
PROVIDER:[TOKEN:[[id number]],FOLLOWUP:[1 month]],PROVIDER:[TOKEN:[[id number]]] PROVIDER:[TOKEN:[[id number]],FOLLOWUP:[1 month]],PROVIDER:[TOKEN:[[id number]],FOLLOWUP:[1 week]]

## 2019-12-11 NOTE — PROGRESS NOTE ADULT - PROBLEM SELECTOR PLAN 2
History of DM. A1c 7.0. Sugars have been relatively controlled in 100-200s  - Continue Lantus 16u  - ISS History of DM. A1c 7.0. Sugars have been relatively controlled in 100-200s  - Continue Lantus 16u  - Consider adding pre-meal Humalog tomorrow   - ISS

## 2019-12-11 NOTE — PROGRESS NOTE ADULT - PROBLEM SELECTOR PLAN 8
F: None  E: Replete PRN for K<4, Mg<2  N: Pureed, thin liquid diet  DVT PPx: Hold in setting of recent intracranial bleed  GI PPx: None  Bowel: None  Pain: Tylenol  Code status: Full code  Dispo: [facility], received acceptance to [facility] but family wants [facility] F: None  E: Replete PRN for K<4, Mg<2  N: Pureed, thin liquid diet  DVT PPx: Hold in setting of recent intracranial bleed  GI PPx: None  Bowel: None  Pain: Tylenol  Code status: Full code  Dispo: [facility]

## 2019-12-11 NOTE — PROGRESS NOTE ADULT - ASSESSMENT
Pt is an 89M with PMH DMII admitted s/p fall with traumatic SAH/SDH on 11/28, now admitted to [facility] with aspiration pneumonia and s/p PEG now stable for stepdown to F.

## 2019-12-11 NOTE — ADVANCED PRACTICE NURSE CONSULT - RECOMMEDATIONS
Recommend antifungal moisture barrier ointment to bilat buttocks and over pressure injuries, do not cover. Spoke with ZIGGY Taylor and house staff.

## 2019-12-11 NOTE — DISCHARGE NOTE PROVIDER - NSDCCAREPROVSEEN_GEN_ALL_CORE_FT
Deborah Elliott Deborah Elliott A  Patient seen and examined with house-staff during bedside rounds.  Resident note read, including vitals, physical findings, laboratory data, and radiological reports.   Revisions included below.  Direct personal management at bed side and extensive interpretation of the data.  Plan was outlined and discussed in details with the housestaff.  Decision making of high complexity  Action taken for acute disease activity to reflect the level of care provided:  - medication reconciliation  - review laboratory data  I discussed the case with the daughter.  CXR improved and need to follow in 8 weks.  PEG will be removed in rehab

## 2019-12-11 NOTE — DISCHARGE NOTE PROVIDER - CARE PROVIDER_API CALL
Eduardo Morin)  Neurosurgery  [address], [address] [place] [address]  Phone: [phone]  Fax: [phone]  Follow Up Time: 1 month    Deborah Elliott)  Critical Care Medicine; Pulmonary Disease  [address], [address] [place] [address]  Phone: [phone]  Fax: [phone]  Follow Up Time: Eduardo Morin)  Neurosurgery  [address], [address] [place] [address]  Phone: [phone]  Fax: [phone]  Follow Up Time: 1 month    Deborah Elliott)  Critical Care Medicine; Pulmonary Disease  [address], [address] [place] [address]  Phone: [phone]  Fax: [phone]  Follow Up Time: 1 week

## 2019-12-11 NOTE — PROGRESS NOTE ADULT - PROBLEM SELECTOR PLAN 3
EKG 12/6 showing AFib with RVR. Unclear if pt has history of AFib. CHADSVASC score 5   - Lopressor 12.5 mg today  - Consider increasing dose tomorrow  - Obtain collateral from cardiologist EKG 12/6 showing AFib with RVR. Unclear if pt has history of AFib. CHADSVASC score 5   - C/w Lopressor 12.5 mg

## 2019-12-11 NOTE — DISCHARGE NOTE PROVIDER - NSDCFUSCHEDAPPT_GEN_ALL_CORE_FT
LOS MCFADDEN ; 01/13/2020 ; [facility] [address]  LOS MCFADDEN ; 01/13/2020 ; [facility] [address] LOS MCFADDEN ; 01/13/2020 ; [facility] [address]  LOS MCFADDEN ; 01/13/2020 ; [facility] [address] LOS MCFADDEN ; 01/13/2020 ; [facility] [address]  LOS MCFADDEN ; 01/13/2020 ; [facility] [address] [place]  LOS MCFADDEN ; 01/13/2020 ; [facility] [address] LOS MCFADDEN ; 01/13/2020 ; [facility] [address]  LOS MCFADDEN ; 01/13/2020 ; [facility] [address]  LOS MCFADDEN ; 01/13/2020 ; [facility] [address] LOS MCFADDEN ; 01/13/2020 ; [facility] [address]  LOS MCFADDEN ; 01/13/2020 ; [facility] [address] [place]  LOS MCFADDEN ; 01/13/2020 ; [facility] [address] [place] LOS MCFADDEN ; 01/13/2020 ; [facility] Neuro[facility] [address]  LOS MCFADDEN ; 01/13/2020 ; [facility] [address] [place]  LOS MCFADDEN ; 01/13/2020 ; [facility] [address] [place] LOS MCFADDEN ; 01/13/2020 ; [facility] Neuro[facility] [address]  LOS MCFADDEN ; 01/13/2020 ; [facility] [address] [place]  LOS MCFADDEN ; 01/13/2020 ; [facility] [address] [place] LOS MCFADDEN ; 01/13/2020 ; [facility] Neuro[facility] [address]  LOS MCFADDEN ; 01/13/2020 ; [facility] [address] [place]  LOS MCFADDEN ; 01/13/2020 ; [facility] [address] [place]

## 2019-12-11 NOTE — CHART NOTE - NSCHARTNOTEFT_GEN_A_CORE
Admitting Diagnosis:   Patient is a 89y old  Male who presents with a chief complaint of traumatic SAH found down (10 Dec 2019 18:04)      PAST MEDICAL & SURGICAL HISTORY:  DM (diabetes mellitus)  H/O hernia repair      Current Nutrition Order: dysphagia 1 puree w/ thin liquids     PO Intake: Good (%) [ x  ]  Fair (50-75%) [   ] Poor (<25%) [   ]    GI Issues: Denies N/V/C/D with last BM 12/10    Pain: Not reporting pain at this time     Skin Integrity: 1+ edema b/l feet 12/10, +stage II PI R buttocks, +stage II PI L buttocks, +unstageable PI sacrum    Labs:   12-11    133<L>  |  98  |  11  ----------------------------<  64<L>  4.1   |  25  |  0.50    Ca    8.4      11 Dec 2019 06:56  Mg     2.0     12-11      CAPILLARY BLOOD GLUCOSE      POCT Blood Glucose.: 150 mg/dL (11 Dec 2019 12:10)  POCT Blood Glucose.: 62 mg/dL (11 Dec 2019 08:37)  POCT Blood Glucose.: 253 mg/dL (10 Dec 2019 21:52)      Medications:  MEDICATIONS  (STANDING):  ascorbic acid 500 milliGRAM(s) Oral daily  dextrose 5%. 1000 milliLiter(s) (50 mL/Hr) IV Continuous <Continuous>  dextrose 50% Injectable 12.5 Gram(s) IV Push once  enoxaparin Injectable 40 milliGRAM(s) SubCutaneous every 24 hours  insulin glargine Injectable (LANTUS) 16 Unit(s) SubCutaneous at bedtime  insulin lispro (HumaLOG) corrective regimen sliding scale   SubCutaneous every 6 hours  metoprolol tartrate 12.5 milliGRAM(s) Oral every 12 hours  multivitamin 1 Tablet(s) Oral daily  piperacillin/tazobactam IVPB.. 4.5 Gram(s) IV Intermittent every 6 hours  zinc sulfate 220 milliGRAM(s) Oral daily    MEDICATIONS  (PRN):  acetaminophen    Suspension .. 650 milliGRAM(s) Oral every 6 hours PRN Temp greater or equal to 38C (100.4F), Mild Pain (1 - 3)  dextrose 40% Gel 15 Gram(s) Oral once PRN Blood Glucose LESS THAN 70 milliGRAM(s)/deciliter  glucagon  Injectable 1 milliGRAM(s) IntraMuscular once PRN Glucose LESS THAN 70 milligrams/deciliter      Weight:  Admit wt- 64kg  11/28- 65.9kg  11/30- 64kg  12/5- 65.7kg  12/6-65.5kg    Weight Change: 1.5kg wt gain since admission    Estimated energy needs:   5'10", IBW 166lbs+/-10% %MKM=507% BMI 20.66  ABW (66kg) used for calculations as pt between % of IBW. Nutrient needs based on [facility] standards of care for maintenance in older adults. Needs adjusted for suspected severe protein calorie malnutrition, pressure injury  1650-1980kcal/day (25-30kcal/kg)  106-119g pro/day (1.6-1.8g pro/kg)  Fluids per team.    Subjective:   Pt seen for nutrition follow up. 89M with PMH DMII admitted s/p fall on 11/28. Pt found to have a SAH/SDH hematoma and was admitted to neurosurgery ICU. Pt was initially aphasic but moving all extremities spontaneously. MRI showed hemorrhagic contusion in left inferior temporal lobe and small left subdural hemorrhage with no acute infarction. Failed speech and swallow evaluation with FEES. Was started on NG tube feeds on 11/29. F/w aspiration pneumonia, spiked fever to 100.9 on 12/5, started on vanc/zosyn. Given concern for aspiration and dysphagia, PEG was placed on 12/6 and feeds were started after 24hours. He was able to walk with PT/OT. MRSA swab neg for MSRA, vanc discontinued, zosyn completed (12/10). Seen by SLP 12/10 w/ recommendation for puree w/ thin liquids, d/c tube feeds on 12/10. Being followed by behavioral health due to delirium. Pt seen resting in bed, awake, alert. Pt is on Dysphagia 1 puree diet w/ thin liquids w/ good PO intake, consumed 100% of meal tray for dinner last night and ~75% of breakfast tray this AM, reports feeling “very hungry”. Tolerating puree well w/ no difficulty chewing/swallowing. Pt expressing some concern regarding BG, POCT: 12/10: 144-258, 12/9: 131-239, 12/8: 180-329 and HgbA1c 7.0% (11/29), pt amenable to cstCHO diet order. Denies GI distress. Diet education provided. Nutrition recs below. RD to follow up per protocol.     Previous Nutrition Diagnosis:  1. Severe protein calorie malnutrition RT unable to meet increased needs AEB hypermetabolic state/wounds  Active [  x ]  Resolved [   ]  2. Increased nutrient needs RT increased demand for kcal/pro AEB multiple pressure ulcers and visible muscle wasting   Active [  x ]  Resolved [   ]  3.  Inadequate energy intake RT intake<EER AEB TF on hold for PEG  Active [   ]  Resolved [ x  ]    Goal: Pt to consistently meet % of estimated needs via most appropriate route and avoid any further s/s malnutrition    Recommendations:  1. Recommend add cstCHO +snack to puree diet w/ thin liquids for improved BG control (pt amenable)  2. Monitor for s/s intolerance. Maintain aspiration precautions at all times. Monitor GI distress.   3. Recommend c/w MVI, Vit C 500mg per day and Zinc Sulfate 220mg x10 days use to promote wound healing  4. Monitor Labs, wts, Skin, GOC.  5. RD to remain available for additional nutrition interventions as needed.     Education: Discussed puree diet, discussed cstCHO diet, pt amenable     Risk Level: High [   ] Moderate [  x ] Low [   ].

## 2019-12-11 NOTE — DISCHARGE NOTE PROVIDER - CARE PROVIDERS DIRECT ADDRESSES
[email or web address]

## 2019-12-11 NOTE — PROGRESS NOTE ADULT - SUBJECTIVE AND OBJECTIVE BOX
OVERNIGHT EVENTS:    SUBJECTIVE / INTERVAL HPI: Patient seen and examined at bedside.     VITAL SIGNS:  Vital Signs Last 24 Hrs  T(C): 36.6 (11 Dec 2019 17:10), Max: 36.9 (10 Dec 2019 18:28)  T(F): 97.8 (11 Dec 2019 17:10), Max: 98.4 (10 Dec 2019 18:28)  HR: 95 (11 Dec 2019 17:10) (91 - 98)  BP: 105/61 (11 Dec 2019 17:10) (105/61 - 125/65)  BP(mean): --  RR: 18 (11 Dec 2019 17:10) (16 - 20)  SpO2: 94% (11 Dec 2019 17:10) (94% - 97%)    PHYSICAL EXAM:    General: WDWN  HEENT: NC/AT; PERRL, anicteric sclera; MMM  Neck: supple  Cardiovascular: +S1/S2; RRR  Respiratory: CTA B/L; no W/R/R  Gastrointestinal: soft, NT/ND; +BSx4  Extremities: WWP; no edema, clubbing or cyanosis  Vascular: 2+ radial, DP/PT pulses B/L  Neurological: AAOx3; no focal deficits    MEDICATIONS:  MEDICATIONS  (STANDING):  ascorbic acid 500 milliGRAM(s) Oral daily  dextrose 5%. 1000 milliLiter(s) (50 mL/Hr) IV Continuous <Continuous>  dextrose 50% Injectable 12.5 Gram(s) IV Push once  enoxaparin Injectable 40 milliGRAM(s) SubCutaneous every 24 hours  insulin glargine Injectable (LANTUS) 16 Unit(s) SubCutaneous at bedtime  insulin lispro (HumaLOG) corrective regimen sliding scale   SubCutaneous every 6 hours  metoprolol tartrate 12.5 milliGRAM(s) Oral every 12 hours  multivitamin 1 Tablet(s) Oral daily  piperacillin/tazobactam IVPB.. 4.5 Gram(s) IV Intermittent every 6 hours  zinc sulfate 220 milliGRAM(s) Oral daily    MEDICATIONS  (PRN):  acetaminophen    Suspension .. 650 milliGRAM(s) Oral every 6 hours PRN Temp greater or equal to 38C (100.4F), Mild Pain (1 - 3)  dextrose 40% Gel 15 Gram(s) Oral once PRN Blood Glucose LESS THAN 70 milliGRAM(s)/deciliter  glucagon  Injectable 1 milliGRAM(s) IntraMuscular once PRN Glucose LESS THAN 70 milligrams/deciliter      ALLERGIES:  Allergies    No Known Allergies    Intolerances        LABS:                        11.4   5.94  )-----------( 339      ( 11 Dec 2019 06:56 )             37.1     12-11    133<L>  |  98  |  11  ----------------------------<  64<L>  4.1   |  25  |  0.50    Ca    8.4      11 Dec 2019 06:56  Mg     2.0     12-11          CAPILLARY BLOOD GLUCOSE      POCT Blood Glucose.: 226 mg/dL (11 Dec 2019 17:14)      RADIOLOGY & ADDITIONAL TESTS: Reviewed. OVERNIGHT EVENTS: None     SUBJECTIVE / INTERVAL HPI: Patient seen and examined at bedside. Patient resting in bed reporting he feels wet and cold. Reports slight difficulty breathing. Denies headaches, chest pain, abdominal pain, nausea, vomiting, dysuria.      VITAL SIGNS:  Vital Signs Last 24 Hrs  T(C): 36.6 (11 Dec 2019 17:10), Max: 36.9 (10 Dec 2019 18:28)  T(F): 97.8 (11 Dec 2019 17:10), Max: 98.4 (10 Dec 2019 18:28)  HR: 95 (11 Dec 2019 17:10) (91 - 98)  BP: 105/61 (11 Dec 2019 17:10) (105/61 - 125/65)  BP(mean): --  RR: 18 (11 Dec 2019 17:10) (16 - 20)  SpO2: 94% (11 Dec 2019 17:10) (94% - 97%)    PHYSICAL EXAM:    General: WDWN, resting comfortably in bed, NAD   HEENT: NC/AT; PERRL, anicteric sclera; MMM  Neck: supple  Cardiovascular: +S1/S2; RRR  Respiratory: CTA B/L; no W/R/R  Gastrointestinal: PEG tube in place, soft, NT/ND; +BSx4  Extremities: WWP; no edema, clubbing or cyanosis  Skin: Diffuse ecchymoses, decubitus ulcers over heels, elbows, sacrum. Rash on buttocks   Vascular: 2+ radial, DP/PT pulses B/L  Neurological: AAOx3; no focal deficits    MEDICATIONS:  MEDICATIONS  (STANDING):  ascorbic acid 500 milliGRAM(s) Oral daily  dextrose 5%. 1000 milliLiter(s) (50 mL/Hr) IV Continuous <Continuous>  dextrose 50% Injectable 12.5 Gram(s) IV Push once  enoxaparin Injectable 40 milliGRAM(s) SubCutaneous every 24 hours  insulin glargine Injectable (LANTUS) 16 Unit(s) SubCutaneous at bedtime  insulin lispro (HumaLOG) corrective regimen sliding scale   SubCutaneous every 6 hours  metoprolol tartrate 12.5 milliGRAM(s) Oral every 12 hours  multivitamin 1 Tablet(s) Oral daily  piperacillin/tazobactam IVPB.. 4.5 Gram(s) IV Intermittent every 6 hours  zinc sulfate 220 milliGRAM(s) Oral daily    MEDICATIONS  (PRN):  acetaminophen    Suspension .. 650 milliGRAM(s) Oral every 6 hours PRN Temp greater or equal to 38C (100.4F), Mild Pain (1 - 3)  dextrose 40% Gel 15 Gram(s) Oral once PRN Blood Glucose LESS THAN 70 milliGRAM(s)/deciliter  glucagon  Injectable 1 milliGRAM(s) IntraMuscular once PRN Glucose LESS THAN 70 milligrams/deciliter      ALLERGIES:  Allergies    No Known Allergies    Intolerances        LABS:                        11.4   5.94  )-----------( 339      ( 11 Dec 2019 06:56 )             37.1     12-11    133<L>  |  98  |  11  ----------------------------<  64<L>  4.1   |  25  |  0.50    Ca    8.4      11 Dec 2019 06:56  Mg     2.0     12-11          CAPILLARY BLOOD GLUCOSE      POCT Blood Glucose.: 226 mg/dL (11 Dec 2019 17:14)      RADIOLOGY & ADDITIONAL TESTS: Reviewed.

## 2019-12-11 NOTE — DISCHARGE NOTE PROVIDER - HOSPITAL COURSE
Pt is an 89M with PMH DMII admitted s/p fall on 11/28. Pt found to have a SAH/SDH hematoma and was admitted to neurosurgery ICU. Pt was initially aphasic but moving all extremities spontaneously. MRI showed hemorrhagic contusion in left inferior temporal lobe and small left subdural hemorrhage with no acute infarction. Failed speech and swallow evaluation with FEES. Was started on NG tube feeds on 11/29. Course complicated by aspiration pneumonia, spiked fever to 100.9 on 12/5, started on vanc/zosyn. Given concern for aspiration and dysphagia, PEG was placed on 12/6 and feeds were started after 24hours. He was able to walk with PT/OT. MRSA swab neg for MSRA, vanc discontinued. Completed 7 day course of Zosyn. FEES recs puree diet, d/c tube feeds on 12/10. Tolerating po feeds, hemodynamically stable. Will be discharged to rehab.             Problem List    #PNA     -Management as above         #DM Type II     -Lantus, ISS         #Paroxysmal Afib     -Lopressor 12.5mg         #Dysphagia     -Now passed FEES with speech and swallow. Pureed thin liquid, tolerating po         #Subarachnoid Hemorrhage     -Stable per neurosurgery. Now AAOx3        #Decubitus ulcers     -Multiple decubitus ulcers over elbows, heels, sacrum     -Wound care consulted for ulcers and fungal rash on groin and buttocks. Antifungal barrier moisture placed         #Thyroid nodule     -Incidental finding. TSH wnl. F/u outpatient             Exam to be followed outpatient: Follow up with ??? FIND OUT WHO HE NEEDS TO FOLLOW UP WITH ([facility], NEUROSURGERY, PCP) Pt is an 89M with PMH DMII admitted s/p fall on 11/28. Pt found to have a SAH/SDH hematoma and was admitted to neurosurgery ICU. Pt was initially aphasic but moving all extremities spontaneously. MRI showed hemorrhagic contusion in left inferior temporal lobe and small left subdural hemorrhage with no acute infarction. Failed speech and swallow evaluation with FEES. Was started on NG tube feeds on 11/29. Course complicated by aspiration pneumonia, spiked fever to 100.9 on 12/5, started on vanc/zosyn. Given concern for aspiration and dysphagia, PEG was placed on 12/6 and feeds were started after 24hours. He was able to walk with PT/OT. MRSA swab neg for MSRA, vanc discontinued. Completed 7 day course of Zosyn. FEES recs puree diet, d/c tube feeds on 12/10. Tolerating po feeds, hemodynamically stable. Will be discharged to rehab.             Problem List    #PNA     -Management as above         #DM Type II     -Lantus, ISS         #Paroxysmal Afib     -Lopressor 12.5mg q12h        #Dysphagia     -Now passed FEES with speech and swallow. Pureed thin liquid, tolerating po         #Subarachnoid Hemorrhage     -Stable per neurosurgery. Now AAOx3        #Decubitus ulcers     -Multiple decubitus ulcers over elbows, heels, sacrum     -Wound care consulted for ulcers and fungal rash on groin and buttocks. Antifungal barrier moisture placed         #Thyroid nodule     -Incidental finding. TSH wnl. F/u outpatient             Exam to be followed outpatient: Follow up with Dr. Morin, Dr. Elliott. Pt is an 89M with PMH DMII admitted s/p fall on 11/28. Pt found to have a SAH/SDH hematoma and was admitted to neurosurgery ICU. Pt was initially aphasic but moving all extremities spontaneously. MRI showed hemorrhagic contusion in left inferior temporal lobe and small left subdural hemorrhage with no acute infarction. Failed speech and swallow evaluation with FEES. Was started on NG tube feeds on 11/29. Course complicated by aspiration pneumonia, spiked fever to 100.9 on 12/5, started on vanc/zosyn. Given concern for aspiration and dysphagia, PEG was placed on 12/6 and feeds were started after 24hours. He was able to walk with PT/OT. MRSA swab neg for MSRA, vanc discontinued. Completed 7 day course of Zosyn. FEES recs puree diet, d/c tube feeds on 12/10. Tolerating po feeds, hemodynamically stable. Will be discharged to rehab.             Problem List    #PNA     -Management as above         #DM Type II     -Lantus, ISS         #Paroxysmal Afib     -Lopressor 12.5mg q12h    - started on ASA 81 mg    - can start on eliquis 1 month from initial admission (11/28/19)        #Dysphagia     -Now passed FEES with speech and swallow. Pureed thin liquid, tolerating po         #Subarachnoid Hemorrhage     -Stable per neurosurgery. Now AAOx3        #Decubitus ulcers     -Multiple decubitus ulcers over elbows, heels, sacrum     -Wound care consulted for ulcers and fungal rash on groin and buttocks. Antifungal barrier moisture placed         #Thyroid nodule     -Incidental finding. TSH wnl. F/u outpatient             Exam to be followed outpatient: Follow up with Dr. Morin, Dr. Elliott.

## 2019-12-11 NOTE — DISCHARGE NOTE PROVIDER - NSDCMRMEDTOKEN_GEN_ALL_CORE_FT
NovoLOG FlexPen 100 units/mL injectable solution: 4 unit(s) injectable 3 times a day  Tresiba 100 units/mL subcutaneous solution: 6 unit(s) subcutaneous once a day ascorbic acid 500 mg oral tablet: 1 tab(s) orally once a day  insulin glargine: 16 unit(s) subcutaneous once a day (at bedtime)  metoprolol: 12.5 milligram(s) orally 2 times a day  Multiple Vitamins oral tablet: 1 tab(s) orally once a day  NovoLOG FlexPen 100 units/mL injectable solution: 4 unit(s) injectable 3 times a day  Tresiba 100 units/mL subcutaneous solution: 6 unit(s) subcutaneous once a day  zinc sulfate 220 mg oral capsule: 1 cap(s) orally once a day ascorbic acid 500 mg oral tablet: 1 tab(s) orally once a day  insulin glargine: 16 unit(s) subcutaneous once a day (at bedtime)  metoprolol: 12.5 milligram(s) orally 2 times a day  Multiple Vitamins oral tablet: 1 tab(s) orally once a day  zinc sulfate 220 mg oral capsule: 1 cap(s) orally once a day ascorbic acid 500 mg oral tablet: 1 tab(s) orally once a day  aspirin 81 mg oral delayed release tablet: 1 tab(s) orally once a day  insulin glargine: 16 unit(s) subcutaneous once a day (at bedtime)  metoprolol: 12.5 milligram(s) orally 2 times a day  Multiple Vitamins oral tablet: 1 tab(s) orally once a day  zinc sulfate 220 mg oral capsule: 1 cap(s) orally once a day ascorbic acid 500 mg oral tablet: 1 tab(s) orally once a day  aspirin 81 mg oral delayed release tablet: 1 tab(s) orally once a day  HumaLOG 100 units/mL subcutaneous solution: 4 unit(s) subcutaneous 3 times a day (before meals)  insulin glargine: 6 unit(s) subcutaneous once a day (at bedtime)  metoprolol: 12.5 milligram(s) orally 2 times a day  Multiple Vitamins oral tablet: 1 tab(s) orally once a day  psyllium 3.4 g/7 g oral powder for reconstitution:  orally   zinc sulfate 220 mg oral capsule: 1 cap(s) orally once a day aspirin 81 mg oral delayed release tablet: 1 tab(s) orally once a day  HumaLOG 100 units/mL subcutaneous solution: 4 unit(s) subcutaneous 3 times a day (before meals)  insulin glargine: 6 unit(s) subcutaneous once a day (at bedtime)  metoprolol: 12.5 milligram(s) orally 2 times a day  Multiple Vitamins oral tablet: 1 tab(s) orally once a day  psyllium 3.4 g/7 g oral powder for reconstitution:  orally   zinc sulfate 220 mg oral capsule: 1 cap(s) orally once a day

## 2019-12-11 NOTE — DISCHARGE NOTE PROVIDER - NSDCFUADDAPPT_GEN_ALL_CORE_FT
Follow up with Please follow up with Dr. Morin, Neurosurgery when discharged from rehab. An appointment has been made for you January 13, 2020 at 11AM. Please follow up with Dr. Morin, Neurosurgery when discharged from rehab. An appointment has been made for you January 13, 2020 at 10:30 AM.    Please follow up with Dr. Elliott. Please follow up with Dr. Morin, Neurosurgery when discharged from rehab. An appointment has been made for you January 13, 2020 at 10:30 AM.     Please follow up with Dr. Elliott - should start Eliquis 1 month from initial admission on 11/28/19 per neurosurgery.

## 2019-12-12 LAB
CULTURE RESULTS: SIGNIFICANT CHANGE UP
CULTURE RESULTS: SIGNIFICANT CHANGE UP
GLUCOSE BLDC GLUCOMTR-MCNC: 103 MG/DL — HIGH (ref 70–99)
GLUCOSE BLDC GLUCOMTR-MCNC: 118 MG/DL — HIGH (ref 70–99)
GLUCOSE BLDC GLUCOMTR-MCNC: 195 MG/DL — HIGH (ref 70–99)
GLUCOSE BLDC GLUCOMTR-MCNC: 221 MG/DL — HIGH (ref 70–99)
GLUCOSE BLDC GLUCOMTR-MCNC: 242 MG/DL — HIGH (ref 70–99)
GLUCOSE BLDC GLUCOMTR-MCNC: 51 MG/DL — LOW (ref 70–99)
GLUCOSE BLDC GLUCOMTR-MCNC: 65 MG/DL — LOW (ref 70–99)
SPECIMEN SOURCE: SIGNIFICANT CHANGE UP
SPECIMEN SOURCE: SIGNIFICANT CHANGE UP

## 2019-12-12 PROCEDURE — 99232 SBSQ HOSP IP/OBS MODERATE 35: CPT | Mod: GC

## 2019-12-12 RX ADMIN — INSULIN GLARGINE 16 UNIT(S): 100 INJECTION, SOLUTION SUBCUTANEOUS at 22:34

## 2019-12-12 RX ADMIN — ZINC SULFATE TAB 220 MG (50 MG ZINC EQUIVALENT) 220 MILLIGRAM(S): 220 (50 ZN) TAB at 13:09

## 2019-12-12 RX ADMIN — Medication 12.5 MILLIGRAM(S): at 06:26

## 2019-12-12 RX ADMIN — Medication 1 TABLET(S): at 13:09

## 2019-12-12 RX ADMIN — Medication 12.5 MILLIGRAM(S): at 18:27

## 2019-12-12 RX ADMIN — Medication 500 MILLIGRAM(S): at 13:09

## 2019-12-12 RX ADMIN — Medication 4: at 18:23

## 2019-12-12 RX ADMIN — ENOXAPARIN SODIUM 40 MILLIGRAM(S): 100 INJECTION SUBCUTANEOUS at 18:26

## 2019-12-12 RX ADMIN — Medication 2: at 22:35

## 2019-12-12 RX ADMIN — Medication 4: at 00:25

## 2019-12-12 NOTE — PROGRESS NOTE ADULT - SUBJECTIVE AND OBJECTIVE BOX
OVERNIGHT EVENTS: FS 51 this AM. Pt given orange juice.    SUBJECTIVE / INTERVAL HPI: Patient seen and examined at bedside. Pt denies any chest pain, SOB, abd pain, N/V/D, numbness, tingling, or weakness.     VITAL SIGNS:  Vital Signs Last 24 Hrs  T(C): 36.4 (12 Dec 2019 13:13), Max: 36.6 (11 Dec 2019 17:10)  T(F): 97.6 (12 Dec 2019 13:13), Max: 97.9 (11 Dec 2019 20:49)  HR: 100 (12 Dec 2019 13:13) (93 - 109)  BP: 112/65 (12 Dec 2019 13:13) (105/61 - 118/70)  BP(mean): --  RR: 18 (12 Dec 2019 13:13) (17 - 18)  SpO2: 94% (12 Dec 2019 13:13) (94% - 96%)    PHYSICAL EXAM:    General: Thin, elderly male, lying in bed, NAD  HEENT: NC/AT; PERRL, anicteric sclera; MMM  Neck: supple  Cardiovascular: +S1/S2; RRR  Respiratory: CTA B/L; no W/R/R  Gastrointestinal: soft, NT/ND; +BSx4. Peg tube site clean, dry and intact.  Extremities: WWP; no edema, clubbing or cyanosis  Vascular: 2+ radial, DP/PT pulses B/L  Neurological: AAOx3; no focal deficits. CN II-XII intact. Strength 5/5 b/l UE and LE. Sensation intact in b/l UE and LE.    MEDICATIONS:  MEDICATIONS  (STANDING):  ascorbic acid 500 milliGRAM(s) Oral daily  dextrose 5%. 1000 milliLiter(s) (50 mL/Hr) IV Continuous <Continuous>  dextrose 50% Injectable 12.5 Gram(s) IV Push once  enoxaparin Injectable 40 milliGRAM(s) SubCutaneous every 24 hours  insulin glargine Injectable (LANTUS) 16 Unit(s) SubCutaneous at bedtime  insulin lispro (HumaLOG) corrective regimen sliding scale   SubCutaneous every 6 hours  metoprolol tartrate 12.5 milliGRAM(s) Oral every 12 hours  multivitamin 1 Tablet(s) Oral daily  zinc sulfate 220 milliGRAM(s) Oral daily    MEDICATIONS  (PRN):  acetaminophen    Suspension .. 650 milliGRAM(s) Oral every 6 hours PRN Temp greater or equal to 38C (100.4F), Mild Pain (1 - 3)  dextrose 40% Gel 15 Gram(s) Oral once PRN Blood Glucose LESS THAN 70 milliGRAM(s)/deciliter  glucagon  Injectable 1 milliGRAM(s) IntraMuscular once PRN Glucose LESS THAN 70 milligrams/deciliter      ALLERGIES:  Allergies    No Known Allergies    Intolerances        LABS:                        11.4   5.94  )-----------( 339      ( 11 Dec 2019 06:56 )             37.1     12-11    133<L>  |  98  |  11  ----------------------------<  64<L>  4.1   |  25  |  0.50    Ca    8.4      11 Dec 2019 06:56  Mg     2.0     12-11          CAPILLARY BLOOD GLUCOSE      POCT Blood Glucose.: 118 mg/dL (12 Dec 2019 12:16)      RADIOLOGY & ADDITIONAL TESTS: Reviewed.

## 2019-12-12 NOTE — PROGRESS NOTE ADULT - ASSESSMENT
Pt is an 89M with PMH DMII admitted s/p fall with traumatic SAH/SDH on 11/28, was admitted to [facility] with aspiration pneumonia and s/p PEG now on RMF for further management and placement to [facility]

## 2019-12-12 NOTE — PROGRESS NOTE ADULT - PROBLEM SELECTOR PLAN 1
Pt with cough productive of yellow sputum, spiked temperature 100.9 on 12/4, AM WBC 6.98. CXR with RLL pneumonia consistent with aspiration. MRSA swab neg. - Speech and swallow following  - S/p 7 days of Zosyn  - Tube feeds d/avril, FEES recommendation puree diet. Tolerating po diet.

## 2019-12-12 NOTE — PROGRESS NOTE ADULT - PROBLEM SELECTOR PLAN 8
F: None  E: Replete PRN for K<4, Mg<2  N: Pureed, thin liquid diet  DVT PPx: Hold in setting of recent intracranial bleed  GI PPx: None  Bowel: None  Pain: Tylenol  Code status: Full code  Dispo: RMF

## 2019-12-12 NOTE — PROGRESS NOTE ADULT - PROBLEM SELECTOR PLAN 3
EKG 12/6 showing AFib with RVR. Unclear if pt has history of AFib. CHADSVASC score 5   - C/w Lopressor 12.5 mg

## 2019-12-12 NOTE — PROGRESS NOTE ADULT - PROBLEM SELECTOR PLAN 2
History of DM. A1c 7.0. Sugars have been relatively controlled in 100-200s  - Continue Lantus 16u  - Consider adding pre-meal Humalog   - ISS

## 2019-12-13 LAB
ANION GAP SERPL CALC-SCNC: 10 MMOL/L — SIGNIFICANT CHANGE UP (ref 5–17)
BUN SERPL-MCNC: 9 MG/DL — SIGNIFICANT CHANGE UP (ref 7–23)
CALCIUM SERPL-MCNC: 8.6 MG/DL — SIGNIFICANT CHANGE UP (ref 8.4–10.5)
CHLORIDE SERPL-SCNC: 100 MMOL/L — SIGNIFICANT CHANGE UP (ref 96–108)
CO2 SERPL-SCNC: 26 MMOL/L — SIGNIFICANT CHANGE UP (ref 22–31)
CREAT SERPL-MCNC: 0.53 MG/DL — SIGNIFICANT CHANGE UP (ref 0.5–1.3)
GLUCOSE BLDC GLUCOMTR-MCNC: 104 MG/DL — HIGH (ref 70–99)
GLUCOSE BLDC GLUCOMTR-MCNC: 112 MG/DL — HIGH (ref 70–99)
GLUCOSE BLDC GLUCOMTR-MCNC: 179 MG/DL — HIGH (ref 70–99)
GLUCOSE BLDC GLUCOMTR-MCNC: 244 MG/DL — HIGH (ref 70–99)
GLUCOSE BLDC GLUCOMTR-MCNC: 60 MG/DL — LOW (ref 70–99)
GLUCOSE BLDC GLUCOMTR-MCNC: 90 MG/DL — SIGNIFICANT CHANGE UP (ref 70–99)
GLUCOSE SERPL-MCNC: 100 MG/DL — HIGH (ref 70–99)
HCT VFR BLD CALC: 34.8 % — LOW (ref 39–50)
HGB BLD-MCNC: 10.7 G/DL — LOW (ref 13–17)
MAGNESIUM SERPL-MCNC: 2.1 MG/DL — SIGNIFICANT CHANGE UP (ref 1.6–2.6)
MCHC RBC-ENTMCNC: 30.7 GM/DL — LOW (ref 32–36)
MCHC RBC-ENTMCNC: 30.7 PG — SIGNIFICANT CHANGE UP (ref 27–34)
MCV RBC AUTO: 99.7 FL — SIGNIFICANT CHANGE UP (ref 80–100)
NRBC # BLD: 0 /100 WBCS — SIGNIFICANT CHANGE UP (ref 0–0)
PHOSPHATE SERPL-MCNC: 3.6 MG/DL — SIGNIFICANT CHANGE UP (ref 2.5–4.5)
PLATELET # BLD AUTO: 391 K/UL — SIGNIFICANT CHANGE UP (ref 150–400)
POTASSIUM SERPL-MCNC: 4.3 MMOL/L — SIGNIFICANT CHANGE UP (ref 3.5–5.3)
POTASSIUM SERPL-SCNC: 4.3 MMOL/L — SIGNIFICANT CHANGE UP (ref 3.5–5.3)
RBC # BLD: 3.49 M/UL — LOW (ref 4.2–5.8)
RBC # FLD: 14.5 % — SIGNIFICANT CHANGE UP (ref 10.3–14.5)
SODIUM SERPL-SCNC: 136 MMOL/L — SIGNIFICANT CHANGE UP (ref 135–145)
WBC # BLD: 7.41 K/UL — SIGNIFICANT CHANGE UP (ref 3.8–10.5)
WBC # FLD AUTO: 7.41 K/UL — SIGNIFICANT CHANGE UP (ref 3.8–10.5)

## 2019-12-13 PROCEDURE — 99233 SBSQ HOSP IP/OBS HIGH 50: CPT | Mod: GC

## 2019-12-13 PROCEDURE — 99231 SBSQ HOSP IP/OBS SF/LOW 25: CPT

## 2019-12-13 RX ORDER — INSULIN GLARGINE 100 [IU]/ML
6 INJECTION, SOLUTION SUBCUTANEOUS
Qty: 0 | Refills: 0 | DISCHARGE
Start: 2019-12-13

## 2019-12-13 RX ORDER — INSULIN LISPRO 100/ML
4 VIAL (ML) SUBCUTANEOUS
Refills: 0 | Status: DISCONTINUED | OUTPATIENT
Start: 2019-12-13 | End: 2019-12-16

## 2019-12-13 RX ORDER — ASCORBIC ACID 60 MG
1 TABLET,CHEWABLE ORAL
Qty: 0 | Refills: 0 | DISCHARGE
Start: 2019-12-13

## 2019-12-13 RX ORDER — ZINC SULFATE TAB 220 MG (50 MG ZINC EQUIVALENT) 220 (50 ZN) MG
1 TAB ORAL
Qty: 0 | Refills: 0 | DISCHARGE
Start: 2019-12-13

## 2019-12-13 RX ORDER — INSULIN ASPART 100 [IU]/ML
4 INJECTION, SOLUTION SUBCUTANEOUS
Qty: 0 | Refills: 0 | DISCHARGE

## 2019-12-13 RX ORDER — METOPROLOL TARTRATE 50 MG
12.5 TABLET ORAL
Qty: 0 | Refills: 0 | DISCHARGE
Start: 2019-12-13

## 2019-12-13 RX ORDER — INSULIN GLARGINE 100 [IU]/ML
16 INJECTION, SOLUTION SUBCUTANEOUS
Qty: 0 | Refills: 0 | DISCHARGE
Start: 2019-12-13

## 2019-12-13 RX ORDER — INSULIN DEGLUDEC 100 U/ML
6 INJECTION, SOLUTION SUBCUTANEOUS
Qty: 0 | Refills: 0 | DISCHARGE

## 2019-12-13 RX ORDER — INSULIN GLARGINE 100 [IU]/ML
13 INJECTION, SOLUTION SUBCUTANEOUS AT BEDTIME
Refills: 0 | Status: DISCONTINUED | OUTPATIENT
Start: 2019-12-13 | End: 2019-12-14

## 2019-12-13 RX ADMIN — INSULIN GLARGINE 13 UNIT(S): 100 INJECTION, SOLUTION SUBCUTANEOUS at 22:09

## 2019-12-13 RX ADMIN — Medication 4: at 12:49

## 2019-12-13 RX ADMIN — Medication 1 TABLET(S): at 11:09

## 2019-12-13 RX ADMIN — Medication 12.5 MILLIGRAM(S): at 06:31

## 2019-12-13 RX ADMIN — Medication 2: at 17:14

## 2019-12-13 RX ADMIN — Medication 4 UNIT(S): at 17:13

## 2019-12-13 RX ADMIN — ZINC SULFATE TAB 220 MG (50 MG ZINC EQUIVALENT) 220 MILLIGRAM(S): 220 (50 ZN) TAB at 12:47

## 2019-12-13 RX ADMIN — Medication 12.5 MILLIGRAM(S): at 17:12

## 2019-12-13 RX ADMIN — ENOXAPARIN SODIUM 40 MILLIGRAM(S): 100 INJECTION SUBCUTANEOUS at 17:12

## 2019-12-13 RX ADMIN — Medication 500 MILLIGRAM(S): at 11:08

## 2019-12-13 NOTE — PROGRESS NOTE ADULT - SUBJECTIVE AND OBJECTIVE BOX
OVERNIGHT EVENTS:    SUBJECTIVE / INTERVAL HPI: Patient seen and examined at bedside.     VITAL SIGNS:  Vital Signs Last 24 Hrs  T(C): 36.3 (13 Dec 2019 09:17), Max: 37.1 (13 Dec 2019 05:24)  T(F): 97.3 (13 Dec 2019 09:17), Max: 98.7 (13 Dec 2019 05:24)  HR: 97 (13 Dec 2019 10:23) (82 - 100)  BP: 113/74 (13 Dec 2019 10:23) (102/66 - 145/74)  BP(mean): 76 (13 Dec 2019 09:17) (76 - 76)  RR: 15 (13 Dec 2019 09:17) (15 - 18)  SpO2: 97% (13 Dec 2019 09:17) (94% - 99%)    PHYSICAL EXAM:    General: WDWN  HEENT: NC/AT; PERRL, anicteric sclera; MMM  Neck: supple  Cardiovascular: +S1/S2; RRR  Respiratory: CTA B/L; no W/R/R  Gastrointestinal: soft, NT/ND; +BSx4  Extremities: WWP; no edema, clubbing or cyanosis  Vascular: 2+ radial, DP/PT pulses B/L  Neurological: AAOx3; no focal deficits    MEDICATIONS:  MEDICATIONS  (STANDING):  ascorbic acid 500 milliGRAM(s) Oral daily  dextrose 5%. 1000 milliLiter(s) (50 mL/Hr) IV Continuous <Continuous>  dextrose 50% Injectable 12.5 Gram(s) IV Push once  enoxaparin Injectable 40 milliGRAM(s) SubCutaneous every 24 hours  insulin glargine Injectable (LANTUS) 16 Unit(s) SubCutaneous at bedtime  insulin lispro (HumaLOG) corrective regimen sliding scale   SubCutaneous every 6 hours  metoprolol tartrate 12.5 milliGRAM(s) Oral every 12 hours  multivitamin 1 Tablet(s) Oral daily  zinc sulfate 220 milliGRAM(s) Oral daily    MEDICATIONS  (PRN):  acetaminophen    Suspension .. 650 milliGRAM(s) Oral every 6 hours PRN Temp greater or equal to 38C (100.4F), Mild Pain (1 - 3)  dextrose 40% Gel 15 Gram(s) Oral once PRN Blood Glucose LESS THAN 70 milliGRAM(s)/deciliter  glucagon  Injectable 1 milliGRAM(s) IntraMuscular once PRN Glucose LESS THAN 70 milligrams/deciliter      ALLERGIES:  Allergies    No Known Allergies    Intolerances        LABS:                        10.7   7.41  )-----------( 391      ( 13 Dec 2019 07:59 )             34.8     12-13    136  |  100  |  9   ----------------------------<  100<H>  4.3   |  26  |  0.53    Ca    8.6      13 Dec 2019 07:59  Phos  3.6     12-13  Mg     2.1     12-13          CAPILLARY BLOOD GLUCOSE      POCT Blood Glucose.: 104 mg/dL (13 Dec 2019 08:40)      RADIOLOGY & ADDITIONAL TESTS: Reviewed. OVERNIGHT EVENTS: UVALDO    SUBJECTIVE / INTERVAL HPI: Patient seen and examined at bedside. Pt denies any complaints at this time. No headache, changes in vision, dizziness, weakness, numbness, or tingling. Denies chest pain, abd pain, N/V/D, fever, chills.    VITAL SIGNS:  Vital Signs Last 24 Hrs  T(C): 36.3 (13 Dec 2019 09:17), Max: 37.1 (13 Dec 2019 05:24)  T(F): 97.3 (13 Dec 2019 09:17), Max: 98.7 (13 Dec 2019 05:24)  HR: 97 (13 Dec 2019 10:23) (82 - 100)  BP: 113/74 (13 Dec 2019 10:23) (102/66 - 145/74)  BP(mean): 76 (13 Dec 2019 09:17) (76 - 76)  RR: 15 (13 Dec 2019 09:17) (15 - 18)  SpO2: 97% (13 Dec 2019 09:17) (94% - 99%)    PHYSICAL EXAM:    General: Thin, elderly male, lying in bed, NAD  HEENT: NC/AT; PERRL, anicteric sclera; MMM  Neck: supple  Cardiovascular: +S1/S2; RRR  Respiratory: CTA B/L; no W/R/R  Gastrointestinal: soft, NT/ND; +BSx4. Peg tube site clean, dry and intact.  Extremities: WWP; no edema, clubbing or cyanosis  Vascular: 2+ radial, DP/PT pulses B/L  Neurological: AAOx3; no focal deficits. CN II-XII intact. Strength 5/5 b/l UE and LE. Sensation intact in b/l UE and LE.    MEDICATIONS:  MEDICATIONS  (STANDING):  ascorbic acid 500 milliGRAM(s) Oral daily  dextrose 5%. 1000 milliLiter(s) (50 mL/Hr) IV Continuous <Continuous>  dextrose 50% Injectable 12.5 Gram(s) IV Push once  enoxaparin Injectable 40 milliGRAM(s) SubCutaneous every 24 hours  insulin glargine Injectable (LANTUS) 16 Unit(s) SubCutaneous at bedtime  insulin lispro (HumaLOG) corrective regimen sliding scale   SubCutaneous every 6 hours  metoprolol tartrate 12.5 milliGRAM(s) Oral every 12 hours  multivitamin 1 Tablet(s) Oral daily  zinc sulfate 220 milliGRAM(s) Oral daily    MEDICATIONS  (PRN):  acetaminophen    Suspension .. 650 milliGRAM(s) Oral every 6 hours PRN Temp greater or equal to 38C (100.4F), Mild Pain (1 - 3)  dextrose 40% Gel 15 Gram(s) Oral once PRN Blood Glucose LESS THAN 70 milliGRAM(s)/deciliter  glucagon  Injectable 1 milliGRAM(s) IntraMuscular once PRN Glucose LESS THAN 70 milligrams/deciliter      ALLERGIES:  Allergies    No Known Allergies    Intolerances        LABS:                        10.7   7.41  )-----------( 391      ( 13 Dec 2019 07:59 )             34.8     12-13    136  |  100  |  9   ----------------------------<  100<H>  4.3   |  26  |  0.53    Ca    8.6      13 Dec 2019 07:59  Phos  3.6     12-13  Mg     2.1     12-13          CAPILLARY BLOOD GLUCOSE      POCT Blood Glucose.: 104 mg/dL (13 Dec 2019 08:40)      RADIOLOGY & ADDITIONAL TESTS: Reviewed.

## 2019-12-13 NOTE — PROGRESS NOTE ADULT - PROBLEM SELECTOR PLAN 3
EKG 12/6 showing AFib with RVR. Unclear if pt has history of AFib. CHADSVASC score 5   - C/w Lopressor 12.5 mg q12h

## 2019-12-13 NOTE — PROGRESS NOTE ADULT - ASSESSMENT
Pt is an 89M with PMH DMII admitted s/p fall with traumatic SAH/SDH on 11/28, was admitted to [facility] with aspiration pneumonia and s/p PEG now on RMF for further management and placement to [facility]

## 2019-12-13 NOTE — PROGRESS NOTE ADULT - PROBLEM SELECTOR PLAN 2
History of DM. A1c 7.0. Sugars have been relatively controlled in 100-200s  - Adjusted lantus to 13 units and lispro 4 units premeal   - ISS

## 2019-12-13 NOTE — PROGRESS NOTE ADULT - SUBJECTIVE AND OBJECTIVE BOX
Patient is prepared for discharge today. Neurologically patient is stable. Patient denies headaches, nausea, vomiting, acute weakness/loss of sensation of extremities.    Vital Signs Last 24 Hrs  T(C): 36.3 (13 Dec 2019 16:24), Max: 37.1 (13 Dec 2019 05:24)  T(F): 97.4 (13 Dec 2019 16:24), Max: 98.7 (13 Dec 2019 05:24)  HR: 88 (13 Dec 2019 16:24) (82 - 100)  BP: 97/56 (13 Dec 2019 16:24) (97/56 - 145/74)  BP(mean): 70 (13 Dec 2019 16:24) (70 - 76)  RR: 15 (13 Dec 2019 16:24) (15 - 16)  SpO2: 98% (13 Dec 2019 16:24) (97% - 98%)    I&O's Summary    12 Dec 2019 07:01  -  13 Dec 2019 07:00  --------------------------------------------------------  IN: 0 mL / OUT: 1160 mL / NET: -1160 mL    13 Dec 2019 07:01  -  13 Dec 2019 16:48  --------------------------------------------------------  IN: 0 mL / OUT: 600 mL / NET: -600 mL      PHYSICAL EXAM:  Gen: OE spontaneously, NAD  HEENT: PERRL, EOMI   Neck: supple  Lungs: unlabored breathing  Heart: regular rate and rhythm  Abd: soft, non-tender, non-distended, normoactive +BS  Exts: no edema, 2+ pulses throughout  Neuro: AAOx2, follows simple commands b/l,   CN II-XII: PERRL, EOM intact face symmetric, tongue midline  MAEx4 5/5 UE and LE B/lL  SILT throughout    LABS:                        10.7   7.41  )-----------( 391      ( 13 Dec 2019 07:59 )             34.8     12-13    136  |  100  |  9   ----------------------------<  100<H>  4.3   |  26  |  0.53    Ca    8.6      13 Dec 2019 07:59  Phos  3.6     12-13  Mg     2.1     12-13              CAPILLARY BLOOD GLUCOSE      POCT Blood Glucose.: 179 mg/dL (13 Dec 2019 16:45)  POCT Blood Glucose.: 244 mg/dL (13 Dec 2019 12:43)  POCT Blood Glucose.: 104 mg/dL (13 Dec 2019 08:40)  POCT Blood Glucose.: 90 mg/dL (13 Dec 2019 06:26)  POCT Blood Glucose.: 60 mg/dL (13 Dec 2019 05:42)  POCT Blood Glucose.: 195 mg/dL (12 Dec 2019 21:10)  POCT Blood Glucose.: 242 mg/dL (12 Dec 2019 17:07)      Drug Levels: [] N/A  Vancomycin Level, Trough: 10.7 ug/mL (12-07 @ 06:39)    CSF Analysis: [] N/A      Allergies    No Known Allergies    Intolerances      MEDICATIONS:  Antibiotics:    Neuro:  acetaminophen    Suspension .. 650 milliGRAM(s) Oral every 6 hours PRN    Anticoagulation:  enoxaparin Injectable 40 milliGRAM(s) SubCutaneous every 24 hours    OTHER:  dextrose 40% Gel 15 Gram(s) Oral once PRN  dextrose 50% Injectable 12.5 Gram(s) IV Push once  glucagon  Injectable 1 milliGRAM(s) IntraMuscular once PRN  insulin glargine Injectable (LANTUS) 13 Unit(s) SubCutaneous at bedtime  insulin lispro (HumaLOG) corrective regimen sliding scale   SubCutaneous every 6 hours  insulin lispro Injectable (HumaLOG) 4 Unit(s) SubCutaneous three times a day before meals  metoprolol tartrate 12.5 milliGRAM(s) Oral every 12 hours    IVF:  ascorbic acid 500 milliGRAM(s) Oral daily  dextrose 5%. 1000 milliLiter(s) IV Continuous <Continuous>  multivitamin 1 Tablet(s) Oral daily  zinc sulfate 220 milliGRAM(s) Oral daily    CULTURES:  Culture Results:   No growth at 5 days. (12-07 @ 00:36)  Culture Results:   No growth at 5 days. (12-07 @ 00:36)    RADIOLOGY & ADDITIONAL TESTS:      ASSESSMENT:  89y Male with known DM II s/p fall with traumatic SAH/SDH, now resolved, s/p peg    SUBARACHNOID BLEED  ADVANCED ILLNESS  No pertinent family history in first degree relatives  Handoff  MEWS Score  DM (diabetes mellitus)  Subarachnoid bleed  Paroxysmal A-fib  Atrial fibrillation  Decubitus ulcer  Transition of care performed with sharing of clinical summary  Nutrition, metabolism, and development symptoms  Dysphagia  Thyroid nodule  Type 2 diabetes mellitus without complication, without long-term current use of insulin  Subarachnoid bleed  Sepsis, due to unspecified organism, unspecified whether acute organ dysfunction present  Pneumonia, bacterial  Delirium due to general medical condition  Delirium  Delirium  H/O hernia repair  AMS  21  Thyroid nodule  Decubitus ulcers  Dysphagia  Atrial fibrillation  Diabetes  Pneumonia  Sepsis, due to unspecified organism, unspecified whether acute organ dysfunction present      PLAN:  -anticoagulation medication can be resumed 1 month from 11/18/19  -continue care as per primary team  -neurosurgery will sign off- call our pager for questions [phone]  -D/w Dr. Morin Neurologically patient is stable. Patient denies headaches, nausea, vomiting, acute weakness/loss of sensation of extremities, acute changes in vision.    Vital Signs Last 24 Hrs  T(C): 36.3 (13 Dec 2019 16:24), Max: 37.1 (13 Dec 2019 05:24)  T(F): 97.4 (13 Dec 2019 16:24), Max: 98.7 (13 Dec 2019 05:24)  HR: 88 (13 Dec 2019 16:24) (82 - 100)  BP: 97/56 (13 Dec 2019 16:24) (97/56 - 145/74)  BP(mean): 70 (13 Dec 2019 16:24) (70 - 76)  RR: 15 (13 Dec 2019 16:24) (15 - 16)  SpO2: 98% (13 Dec 2019 16:24) (97% - 98%)    I&O's Summary    12 Dec 2019 07:01  -  13 Dec 2019 07:00  --------------------------------------------------------  IN: 0 mL / OUT: 1160 mL / NET: -1160 mL    13 Dec 2019 07:01  -  13 Dec 2019 16:48  --------------------------------------------------------  IN: 0 mL / OUT: 600 mL / NET: -600 mL      PHYSICAL EXAM:  Gen: OE spontaneously, NAD  HEENT: PERRL, EOMI   Neck: supple  Lungs: unlabored breathing  Heart: regular rate and rhythm  Abd: soft, non-tender, non-distended, normoactive +BS  Exts: no edema, 2+ pulses throughout  Neuro: AAOx3, follows simple commands b/l, speech coherent  CN II-XII: PERRL, EOM intact, face symmetric, tongue midline  MAEx4 5/5 UE and LE B/L  SILT throughout    LABS:                        10.7   7.41  )-----------( 391      ( 13 Dec 2019 07:59 )             34.8     12-13    136  |  100  |  9   ----------------------------<  100<H>  4.3   |  26  |  0.53    Ca    8.6      13 Dec 2019 07:59  Phos  3.6     12-13  Mg     2.1     12-13              CAPILLARY BLOOD GLUCOSE      POCT Blood Glucose.: 179 mg/dL (13 Dec 2019 16:45)  POCT Blood Glucose.: 244 mg/dL (13 Dec 2019 12:43)  POCT Blood Glucose.: 104 mg/dL (13 Dec 2019 08:40)  POCT Blood Glucose.: 90 mg/dL (13 Dec 2019 06:26)  POCT Blood Glucose.: 60 mg/dL (13 Dec 2019 05:42)  POCT Blood Glucose.: 195 mg/dL (12 Dec 2019 21:10)  POCT Blood Glucose.: 242 mg/dL (12 Dec 2019 17:07)      Drug Levels: [] N/A  Vancomycin Level, Trough: 10.7 ug/mL (12-07 @ 06:39)    CSF Analysis: [] N/A      Allergies    No Known Allergies    Intolerances      MEDICATIONS:  Antibiotics:    Neuro:  acetaminophen    Suspension .. 650 milliGRAM(s) Oral every 6 hours PRN    Anticoagulation:  enoxaparin Injectable 40 milliGRAM(s) SubCutaneous every 24 hours    OTHER:  dextrose 40% Gel 15 Gram(s) Oral once PRN  dextrose 50% Injectable 12.5 Gram(s) IV Push once  glucagon  Injectable 1 milliGRAM(s) IntraMuscular once PRN  insulin glargine Injectable (LANTUS) 13 Unit(s) SubCutaneous at bedtime  insulin lispro (HumaLOG) corrective regimen sliding scale   SubCutaneous every 6 hours  insulin lispro Injectable (HumaLOG) 4 Unit(s) SubCutaneous three times a day before meals  metoprolol tartrate 12.5 milliGRAM(s) Oral every 12 hours    IVF:  ascorbic acid 500 milliGRAM(s) Oral daily  dextrose 5%. 1000 milliLiter(s) IV Continuous <Continuous>  multivitamin 1 Tablet(s) Oral daily  zinc sulfate 220 milliGRAM(s) Oral daily    CULTURES:  Culture Results:   No growth at 5 days. (12-07 @ 00:36)  Culture Results:   No growth at 5 days. (12-07 @ 00:36)    RADIOLOGY & ADDITIONAL TESTS:      ASSESSMENT:  89y Male with known DM II s/p fall with traumatic SAH/SDH, now resolved, s/p peg    SUBARACHNOID BLEED  ADVANCED ILLNESS  No pertinent family history in first degree relatives  Handoff  MEWS Score  DM (diabetes mellitus)  Subarachnoid bleed  Paroxysmal A-fib  Atrial fibrillation  Decubitus ulcer  Transition of care performed with sharing of clinical summary  Nutrition, metabolism, and development symptoms  Dysphagia  Thyroid nodule  Type 2 diabetes mellitus without complication, without long-term current use of insulin  Subarachnoid bleed  Sepsis, due to unspecified organism, unspecified whether acute organ dysfunction present  Pneumonia, bacterial  Delirium due to general medical condition  Delirium  Delirium  H/O hernia repair  AMS  21  Thyroid nodule  Decubitus ulcers  Dysphagia  Atrial fibrillation  Diabetes  Pneumonia  Sepsis, due to unspecified organism, unspecified whether acute organ dysfunction present      PLAN:  -anticoagulation medication can be resumed 1 month from initial admission, 11/18/19  -continue care as per primary team  -neurosurgery will sign off- call our pager for questions [phone]  -D/w Dr. Morin

## 2019-12-14 LAB
ANION GAP SERPL CALC-SCNC: 7 MMOL/L — SIGNIFICANT CHANGE UP (ref 5–17)
BUN SERPL-MCNC: 10 MG/DL — SIGNIFICANT CHANGE UP (ref 7–23)
CALCIUM SERPL-MCNC: 8.7 MG/DL — SIGNIFICANT CHANGE UP (ref 8.4–10.5)
CHLORIDE SERPL-SCNC: 101 MMOL/L — SIGNIFICANT CHANGE UP (ref 96–108)
CO2 SERPL-SCNC: 30 MMOL/L — SIGNIFICANT CHANGE UP (ref 22–31)
CREAT SERPL-MCNC: 0.63 MG/DL — SIGNIFICANT CHANGE UP (ref 0.5–1.3)
GLUCOSE BLDC GLUCOMTR-MCNC: 143 MG/DL — HIGH (ref 70–99)
GLUCOSE BLDC GLUCOMTR-MCNC: 153 MG/DL — HIGH (ref 70–99)
GLUCOSE BLDC GLUCOMTR-MCNC: 164 MG/DL — HIGH (ref 70–99)
GLUCOSE BLDC GLUCOMTR-MCNC: 205 MG/DL — HIGH (ref 70–99)
GLUCOSE BLDC GLUCOMTR-MCNC: 51 MG/DL — LOW (ref 70–99)
GLUCOSE BLDC GLUCOMTR-MCNC: 83 MG/DL — SIGNIFICANT CHANGE UP (ref 70–99)
GLUCOSE SERPL-MCNC: 50 MG/DL — LOW (ref 70–99)
HCT VFR BLD CALC: 31.8 % — LOW (ref 39–50)
HGB BLD-MCNC: 10 G/DL — LOW (ref 13–17)
MAGNESIUM SERPL-MCNC: 2 MG/DL — SIGNIFICANT CHANGE UP (ref 1.6–2.6)
MCHC RBC-ENTMCNC: 30.9 PG — SIGNIFICANT CHANGE UP (ref 27–34)
MCHC RBC-ENTMCNC: 31.4 GM/DL — LOW (ref 32–36)
MCV RBC AUTO: 98.1 FL — SIGNIFICANT CHANGE UP (ref 80–100)
NRBC # BLD: 0 /100 WBCS — SIGNIFICANT CHANGE UP (ref 0–0)
PLATELET # BLD AUTO: 432 K/UL — HIGH (ref 150–400)
POTASSIUM SERPL-MCNC: 4.7 MMOL/L — SIGNIFICANT CHANGE UP (ref 3.5–5.3)
POTASSIUM SERPL-SCNC: 4.7 MMOL/L — SIGNIFICANT CHANGE UP (ref 3.5–5.3)
RBC # BLD: 3.24 M/UL — LOW (ref 4.2–5.8)
RBC # FLD: 14.6 % — HIGH (ref 10.3–14.5)
SODIUM SERPL-SCNC: 138 MMOL/L — SIGNIFICANT CHANGE UP (ref 135–145)
WBC # BLD: 7.07 K/UL — SIGNIFICANT CHANGE UP (ref 3.8–10.5)
WBC # FLD AUTO: 7.07 K/UL — SIGNIFICANT CHANGE UP (ref 3.8–10.5)

## 2019-12-14 PROCEDURE — 99232 SBSQ HOSP IP/OBS MODERATE 35: CPT | Mod: GC

## 2019-12-14 PROCEDURE — 71045 X-RAY EXAM CHEST 1 VIEW: CPT | Mod: 26

## 2019-12-14 RX ORDER — INSULIN GLARGINE 100 [IU]/ML
8 INJECTION, SOLUTION SUBCUTANEOUS AT BEDTIME
Refills: 0 | Status: DISCONTINUED | OUTPATIENT
Start: 2019-12-14 | End: 2019-12-15

## 2019-12-14 RX ADMIN — INSULIN GLARGINE 8 UNIT(S): 100 INJECTION, SOLUTION SUBCUTANEOUS at 22:42

## 2019-12-14 RX ADMIN — Medication 2: at 13:19

## 2019-12-14 RX ADMIN — Medication 500 MILLIGRAM(S): at 13:24

## 2019-12-14 RX ADMIN — ENOXAPARIN SODIUM 40 MILLIGRAM(S): 100 INJECTION SUBCUTANEOUS at 17:44

## 2019-12-14 RX ADMIN — Medication 12.5 MILLIGRAM(S): at 06:47

## 2019-12-14 RX ADMIN — ZINC SULFATE TAB 220 MG (50 MG ZINC EQUIVALENT) 220 MILLIGRAM(S): 220 (50 ZN) TAB at 13:24

## 2019-12-14 RX ADMIN — Medication 12.5 MILLIGRAM(S): at 17:44

## 2019-12-14 RX ADMIN — Medication 2: at 19:15

## 2019-12-14 RX ADMIN — Medication 4 UNIT(S): at 13:24

## 2019-12-14 RX ADMIN — Medication 4 UNIT(S): at 19:14

## 2019-12-14 RX ADMIN — Medication 1 TABLET(S): at 13:23

## 2019-12-14 RX ADMIN — Medication 4 UNIT(S): at 08:43

## 2019-12-14 NOTE — PROGRESS NOTE ADULT - ASSESSMENT
Pt is an 89M with PMH DMII admitted s/p fall with traumatic SAH/SDH on 11/28, was admitted to [facility] with aspiration pneumonia and s/p PEG now on RMF for further management and placement to [facility]

## 2019-12-14 NOTE — PROGRESS NOTE ADULT - SUBJECTIVE AND OBJECTIVE BOX
INTERVAL HPI/OVERNIGHT EVENTS:  No chief complaint; Eating well; No more tube feeds; Will repeat portable film       MEDICATIONS  (STANDING):  ascorbic acid 500 milliGRAM(s) Oral daily  dextrose 5%. 1000 milliLiter(s) (50 mL/Hr) IV Continuous <Continuous>  dextrose 50% Injectable 12.5 Gram(s) IV Push once  enoxaparin Injectable 40 milliGRAM(s) SubCutaneous every 24 hours  insulin glargine Injectable (LANTUS) 13 Unit(s) SubCutaneous at bedtime  insulin lispro (HumaLOG) corrective regimen sliding scale   SubCutaneous every 6 hours  insulin lispro Injectable (HumaLOG) 4 Unit(s) SubCutaneous three times a day before meals  metoprolol tartrate 12.5 milliGRAM(s) Oral every 12 hours  multivitamin 1 Tablet(s) Oral daily  zinc sulfate 220 milliGRAM(s) Oral daily    MEDICATIONS  (PRN):  acetaminophen    Suspension .. 650 milliGRAM(s) Oral every 6 hours PRN Temp greater or equal to 38C (100.4F), Mild Pain (1 - 3)  dextrose 40% Gel 15 Gram(s) Oral once PRN Blood Glucose LESS THAN 70 milliGRAM(s)/deciliter  glucagon  Injectable 1 milliGRAM(s) IntraMuscular once PRN Glucose LESS THAN 70 milligrams/deciliter      Allergies    No Known Allergies    Intolerances        Vital Signs Last 24 Hrs  T(C): 37.1 (14 Dec 2019 05:33), Max: 37.1 (14 Dec 2019 05:33)  T(F): 98.8 (14 Dec 2019 05:33), Max: 98.8 (14 Dec 2019 05:33)  HR: 89 (14 Dec 2019 05:33) (88 - 92)  BP: 121/62 (14 Dec 2019 05:33) (97/56 - 121/62)  BP(mean): 70 (13 Dec 2019 16:24) (70 - 70)  RR: 116 (14 Dec 2019 05:33) (15 - 116)  SpO2: 96% (14 Dec 2019 05:33) (96% - 98%)          Constitutional: Awake    Eyes: OVIDIO    ENMT: Negative    Neck: Supple    Back:  no tenderness     Respiratory:  clear    Cardiovascular: S1 S2    Gastrointestinal: soft    Genitourinary:    Extremities:  no edema    Vascular:    Neurological:    Skin:    Lymph Nodes:            12-13 @ 07:01  -  12-14 @ 07:00  --------------------------------------------------------  IN: 0 mL / OUT: 600 mL / NET: -600 mL      LABS:                        10.0   7.07  )-----------( 432      ( 14 Dec 2019 05:55 )             31.8     12-14    138  |  101  |  10  ----------------------------<  50<L>  4.7   |  30  |  0.63    Ca    8.7      14 Dec 2019 05:55  Phos  3.6     12-13  Mg     2.0     12-14            RADIOLOGY & ADDITIONAL TESTS:

## 2019-12-15 LAB
GLUCOSE BLDC GLUCOMTR-MCNC: 116 MG/DL — HIGH (ref 70–99)
GLUCOSE BLDC GLUCOMTR-MCNC: 130 MG/DL — HIGH (ref 70–99)
GLUCOSE BLDC GLUCOMTR-MCNC: 372 MG/DL — HIGH (ref 70–99)
GLUCOSE BLDC GLUCOMTR-MCNC: 55 MG/DL — LOW (ref 70–99)
GLUCOSE BLDC GLUCOMTR-MCNC: 69 MG/DL — LOW (ref 70–99)
GLUCOSE BLDC GLUCOMTR-MCNC: 79 MG/DL — SIGNIFICANT CHANGE UP (ref 70–99)
GLUCOSE BLDC GLUCOMTR-MCNC: 88 MG/DL — SIGNIFICANT CHANGE UP (ref 70–99)
HCT VFR BLD CALC: 32.7 % — LOW (ref 39–50)
HGB BLD-MCNC: 10.1 G/DL — LOW (ref 13–17)
MCHC RBC-ENTMCNC: 30.7 PG — SIGNIFICANT CHANGE UP (ref 27–34)
MCHC RBC-ENTMCNC: 30.9 GM/DL — LOW (ref 32–36)
MCV RBC AUTO: 99.4 FL — SIGNIFICANT CHANGE UP (ref 80–100)
NRBC # BLD: 0 /100 WBCS — SIGNIFICANT CHANGE UP (ref 0–0)
PLATELET # BLD AUTO: 436 K/UL — HIGH (ref 150–400)
RBC # BLD: 3.29 M/UL — LOW (ref 4.2–5.8)
RBC # FLD: 14.6 % — HIGH (ref 10.3–14.5)
WBC # BLD: 7.01 K/UL — SIGNIFICANT CHANGE UP (ref 3.8–10.5)
WBC # FLD AUTO: 7.01 K/UL — SIGNIFICANT CHANGE UP (ref 3.8–10.5)

## 2019-12-15 PROCEDURE — 99232 SBSQ HOSP IP/OBS MODERATE 35: CPT | Mod: GC

## 2019-12-15 RX ORDER — ASPIRIN/CALCIUM CARB/MAGNESIUM 324 MG
81 TABLET ORAL DAILY
Refills: 0 | Status: DISCONTINUED | OUTPATIENT
Start: 2019-12-15 | End: 2019-12-15

## 2019-12-15 RX ORDER — PSYLLIUM SEED (WITH DEXTROSE)
1 POWDER (GRAM) ORAL DAILY
Refills: 0 | Status: DISCONTINUED | OUTPATIENT
Start: 2019-12-15 | End: 2019-12-16

## 2019-12-15 RX ORDER — ASPIRIN/CALCIUM CARB/MAGNESIUM 324 MG
81 TABLET ORAL DAILY
Refills: 0 | Status: DISCONTINUED | OUTPATIENT
Start: 2019-12-15 | End: 2019-12-16

## 2019-12-15 RX ORDER — INSULIN GLARGINE 100 [IU]/ML
5 INJECTION, SOLUTION SUBCUTANEOUS AT BEDTIME
Refills: 0 | Status: DISCONTINUED | OUTPATIENT
Start: 2019-12-15 | End: 2019-12-16

## 2019-12-15 RX ADMIN — Medication 12.5 MILLIGRAM(S): at 06:57

## 2019-12-15 RX ADMIN — Medication 81 MILLIGRAM(S): at 12:11

## 2019-12-15 RX ADMIN — ENOXAPARIN SODIUM 40 MILLIGRAM(S): 100 INJECTION SUBCUTANEOUS at 19:20

## 2019-12-15 RX ADMIN — Medication 10: at 22:51

## 2019-12-15 RX ADMIN — Medication 4 UNIT(S): at 13:35

## 2019-12-15 RX ADMIN — ZINC SULFATE TAB 220 MG (50 MG ZINC EQUIVALENT) 220 MILLIGRAM(S): 220 (50 ZN) TAB at 11:59

## 2019-12-15 RX ADMIN — Medication 500 MILLIGRAM(S): at 12:00

## 2019-12-15 RX ADMIN — INSULIN GLARGINE 5 UNIT(S): 100 INJECTION, SOLUTION SUBCUTANEOUS at 22:52

## 2019-12-15 RX ADMIN — Medication 4 UNIT(S): at 17:50

## 2019-12-15 RX ADMIN — Medication 1 TABLET(S): at 12:00

## 2019-12-15 RX ADMIN — Medication 1 PACKET(S): at 19:20

## 2019-12-15 RX ADMIN — Medication 12.5 MILLIGRAM(S): at 19:21

## 2019-12-15 RX ADMIN — Medication 4 UNIT(S): at 09:12

## 2019-12-15 NOTE — PROGRESS NOTE ADULT - SUBJECTIVE AND OBJECTIVE BOX
OVERNIGHT EVENTS:    SUBJECTIVE / INTERVAL HPI: Patient seen and examined at bedside.     VITAL SIGNS:  Vital Signs Last 24 Hrs  T(C): 36.6 (15 Dec 2019 05:25), Max: 36.6 (14 Dec 2019 13:46)  T(F): 97.8 (15 Dec 2019 05:25), Max: 97.9 (14 Dec 2019 13:46)  HR: 91 (15 Dec 2019 05:25) (91 - 97)  BP: 109/65 (15 Dec 2019 05:25) (102/64 - 115/68)  BP(mean): --  RR: 15 (15 Dec 2019 05:25) (15 - 16)  SpO2: 98% (15 Dec 2019 05:25) (97% - 98%)    PHYSICAL EXAM:    General: WDWN  HEENT: NC/AT; PERRL, anicteric sclera; MMM  Neck: supple  Cardiovascular: +S1/S2; RRR  Respiratory: CTA B/L; no W/R/R  Gastrointestinal: soft, NT/ND; +BSx4  Extremities: WWP; no edema, clubbing or cyanosis  Vascular: 2+ radial, DP/PT pulses B/L  Neurological: AAOx3; no focal deficits    MEDICATIONS:  MEDICATIONS  (STANDING):  ascorbic acid 500 milliGRAM(s) Oral daily  aspirin enteric coated 81 milliGRAM(s) Oral daily  dextrose 5%. 1000 milliLiter(s) (50 mL/Hr) IV Continuous <Continuous>  dextrose 50% Injectable 12.5 Gram(s) IV Push once  enoxaparin Injectable 40 milliGRAM(s) SubCutaneous every 24 hours  insulin glargine Injectable (LANTUS) 5 Unit(s) SubCutaneous at bedtime  insulin lispro (HumaLOG) corrective regimen sliding scale   SubCutaneous every 6 hours  insulin lispro Injectable (HumaLOG) 4 Unit(s) SubCutaneous three times a day before meals  metoprolol tartrate 12.5 milliGRAM(s) Oral every 12 hours  multivitamin 1 Tablet(s) Oral daily  zinc sulfate 220 milliGRAM(s) Oral daily    MEDICATIONS  (PRN):  acetaminophen    Suspension .. 650 milliGRAM(s) Oral every 6 hours PRN Temp greater or equal to 38C (100.4F), Mild Pain (1 - 3)  dextrose 40% Gel 15 Gram(s) Oral once PRN Blood Glucose LESS THAN 70 milliGRAM(s)/deciliter  glucagon  Injectable 1 milliGRAM(s) IntraMuscular once PRN Glucose LESS THAN 70 milligrams/deciliter      ALLERGIES:  Allergies    No Known Allergies    Intolerances        LABS:                        10.1   7.01  )-----------( 436      ( 15 Dec 2019 06:03 )             32.7     12-14    138  |  101  |  10  ----------------------------<  50<L>  4.7   |  30  |  0.63    Ca    8.7      14 Dec 2019 05:55  Mg     2.0     12-14          CAPILLARY BLOOD GLUCOSE      POCT Blood Glucose.: 79 mg/dL (15 Dec 2019 07:49)      RADIOLOGY & ADDITIONAL TESTS: Reviewed. OVERNIGHT EVENTS:    SUBJECTIVE / INTERVAL HPI: Patient seen and examined at bedside. Pt denies HA, light headedness, dizziness, chest pain, SOB, abd pain, N/V/D. Reports he is looking forward to getting his strength back.     VITAL SIGNS:  Vital Signs Last 24 Hrs  T(C): 36.6 (15 Dec 2019 05:25), Max: 36.6 (14 Dec 2019 13:46)  T(F): 97.8 (15 Dec 2019 05:25), Max: 97.9 (14 Dec 2019 13:46)  HR: 91 (15 Dec 2019 05:25) (91 - 97)  BP: 109/65 (15 Dec 2019 05:25) (102/64 - 115/68)  BP(mean): --  RR: 15 (15 Dec 2019 05:25) (15 - 16)  SpO2: 98% (15 Dec 2019 05:25) (97% - 98%)    PHYSICAL EXAM:    General: Thin, elderly male, lying in bed, NAD  HEENT: NC/AT; PERRL, anicteric sclera; MMM  Neck: supple  Cardiovascular: +S1/S2; RRR  Respiratory: CTA B/L; no W/R/R  Gastrointestinal: soft, NT/ND; +BSx4. Peg tube site clean, dry and intact.  Extremities: WWP; no edema, clubbing or cyanosis  Vascular: 2+ radial, DP/PT pulses B/L  Neurological: AAOx3; no focal deficits. CN II-XII intact. Strength 5/5 b/l UE and LE. Sensation intact in b/l UE and LE.    MEDICATIONS:  MEDICATIONS  (STANDING):  ascorbic acid 500 milliGRAM(s) Oral daily  aspirin enteric coated 81 milliGRAM(s) Oral daily  dextrose 5%. 1000 milliLiter(s) (50 mL/Hr) IV Continuous <Continuous>  dextrose 50% Injectable 12.5 Gram(s) IV Push once  enoxaparin Injectable 40 milliGRAM(s) SubCutaneous every 24 hours  insulin glargine Injectable (LANTUS) 5 Unit(s) SubCutaneous at bedtime  insulin lispro (HumaLOG) corrective regimen sliding scale   SubCutaneous every 6 hours  insulin lispro Injectable (HumaLOG) 4 Unit(s) SubCutaneous three times a day before meals  metoprolol tartrate 12.5 milliGRAM(s) Oral every 12 hours  multivitamin 1 Tablet(s) Oral daily  zinc sulfate 220 milliGRAM(s) Oral daily    MEDICATIONS  (PRN):  acetaminophen    Suspension .. 650 milliGRAM(s) Oral every 6 hours PRN Temp greater or equal to 38C (100.4F), Mild Pain (1 - 3)  dextrose 40% Gel 15 Gram(s) Oral once PRN Blood Glucose LESS THAN 70 milliGRAM(s)/deciliter  glucagon  Injectable 1 milliGRAM(s) IntraMuscular once PRN Glucose LESS THAN 70 milligrams/deciliter      ALLERGIES:  Allergies    No Known Allergies    Intolerances        LABS:                        10.1   7.01  )-----------( 436      ( 15 Dec 2019 06:03 )             32.7     12-14    138  |  101  |  10  ----------------------------<  50<L>  4.7   |  30  |  0.63    Ca    8.7      14 Dec 2019 05:55  Mg     2.0     12-14          CAPILLARY BLOOD GLUCOSE      POCT Blood Glucose.: 79 mg/dL (15 Dec 2019 07:49)      RADIOLOGY & ADDITIONAL TESTS: Reviewed. OVERNIGHT EVENTS: Midnight FS was 69 - pt was given OJ. Repeat .    SUBJECTIVE / INTERVAL HPI: Patient seen and examined at bedside. Pt denies HA, light headedness, dizziness, chest pain, SOB, abd pain, N/V/D. Reports he is looking forward to getting his strength back.     VITAL SIGNS:  Vital Signs Last 24 Hrs  T(C): 36.6 (15 Dec 2019 05:25), Max: 36.6 (14 Dec 2019 13:46)  T(F): 97.8 (15 Dec 2019 05:25), Max: 97.9 (14 Dec 2019 13:46)  HR: 91 (15 Dec 2019 05:25) (91 - 97)  BP: 109/65 (15 Dec 2019 05:25) (102/64 - 115/68)  BP(mean): --  RR: 15 (15 Dec 2019 05:25) (15 - 16)  SpO2: 98% (15 Dec 2019 05:25) (97% - 98%)    PHYSICAL EXAM:    General: Thin, elderly male, lying in bed, NAD  HEENT: NC/AT; PERRL, anicteric sclera; MMM  Neck: supple  Cardiovascular: +S1/S2; RRR  Respiratory: CTA B/L; no W/R/R  Gastrointestinal: soft, NT/ND; +BSx4. Peg tube site clean, dry and intact.  Extremities: WWP; no edema, clubbing or cyanosis  Vascular: 2+ radial, DP/PT pulses B/L  Neurological: AAOx3; no focal deficits. CN II-XII intact. Strength 5/5 b/l UE and LE. Sensation intact in b/l UE and LE.    MEDICATIONS:  MEDICATIONS  (STANDING):  ascorbic acid 500 milliGRAM(s) Oral daily  aspirin enteric coated 81 milliGRAM(s) Oral daily  dextrose 5%. 1000 milliLiter(s) (50 mL/Hr) IV Continuous <Continuous>  dextrose 50% Injectable 12.5 Gram(s) IV Push once  enoxaparin Injectable 40 milliGRAM(s) SubCutaneous every 24 hours  insulin glargine Injectable (LANTUS) 5 Unit(s) SubCutaneous at bedtime  insulin lispro (HumaLOG) corrective regimen sliding scale   SubCutaneous every 6 hours  insulin lispro Injectable (HumaLOG) 4 Unit(s) SubCutaneous three times a day before meals  metoprolol tartrate 12.5 milliGRAM(s) Oral every 12 hours  multivitamin 1 Tablet(s) Oral daily  zinc sulfate 220 milliGRAM(s) Oral daily    MEDICATIONS  (PRN):  acetaminophen    Suspension .. 650 milliGRAM(s) Oral every 6 hours PRN Temp greater or equal to 38C (100.4F), Mild Pain (1 - 3)  dextrose 40% Gel 15 Gram(s) Oral once PRN Blood Glucose LESS THAN 70 milliGRAM(s)/deciliter  glucagon  Injectable 1 milliGRAM(s) IntraMuscular once PRN Glucose LESS THAN 70 milligrams/deciliter      ALLERGIES:  Allergies    No Known Allergies    Intolerances        LABS:                        10.1   7.01  )-----------( 436      ( 15 Dec 2019 06:03 )             32.7     12-14    138  |  101  |  10  ----------------------------<  50<L>  4.7   |  30  |  0.63    Ca    8.7      14 Dec 2019 05:55  Mg     2.0     12-14          CAPILLARY BLOOD GLUCOSE      POCT Blood Glucose.: 79 mg/dL (15 Dec 2019 07:49)      RADIOLOGY & ADDITIONAL TESTS: Reviewed.

## 2019-12-15 NOTE — PROGRESS NOTE ADULT - PROBLEM SELECTOR PROBLEM 1
Pneumonia, bacterial

## 2019-12-15 NOTE — PROGRESS NOTE ADULT - PROBLEM SELECTOR PLAN 3
EKG 12/6 showing AFib with RVR. Unclear if pt has history of AFib. CHADSVASC score 5   - C/w Lopressor 12.5 mg q12h  - started on asa 81 mg now. Pt can start apixiban 12/18/19. (1 month from initial admission per neurosugery)

## 2019-12-15 NOTE — PROGRESS NOTE ADULT - PROBLEM SELECTOR PLAN 1
Pt with cough productive of yellow sputum, spiked temperature 100.9 on 12/4, AM WBC 6.98. CXR with RLL pneumonia consistent with aspiration. MRSA swab neg. - Speech and swallow following  - S/p 7 days of Zosyn  - Tube feeds d/avril, FEES recommendation puree diet. Tolerating po diet. Pt with cough productive of yellow sputum, spiked temperature 100.9 on 12/4, AM WBC 6.98. CXR with RLL pneumonia consistent with aspiration. MRSA swab neg. - Speech and swallow following  - S/p 7 days of Zosyn  - Tube feeds d/avril, FEES recommendation puree diet. Tolerating po diet.  - Repeat CXR from 12/14 improved.

## 2019-12-15 NOTE — PROGRESS NOTE ADULT - ASSESSMENT
Pt is an 89M with PMH DMII admitted s/p fall with traumatic SAH/SDH on 11/28, was admitted to [facility] with aspiration pneumonia and s/p PEG now on RMF for further management and placement to [facility]

## 2019-12-15 NOTE — PROGRESS NOTE ADULT - PROBLEM SELECTOR PROBLEM 2
Type 2 diabetes mellitus without complication, without long-term current use of insulin

## 2019-12-15 NOTE — PROGRESS NOTE ADULT - ATTENDING COMMENTS
ATTENDING ATTESTATION:  I was physically present for the key portions of the evaluation and management (E/M) service provided.  I agree with the above history, physical and plan, which I have reviewed and edited where appropriate.  Patient at high risk for neurological deterioration or death due to:  expanding intracranial hemorrhage    Critical care time:  I have personally provided  45  minutes of critical care time, excluding time spent on separately-billable procedures.      Plan discussed with RN, PA team.
ATTENDING ATTESTATION:  I was physically present for the key portions of the evaluation and management (E/M) service provided.  I agree with the above history, physical and plan, which I have reviewed and edited where appropriate.  Patient at high risk for neurological deterioration or death due to:  intracranial hemorrhage    Critical care time:  I have personally provided  35  minutes of critical care time, excluding time spent on separately-billable procedures.      Plan discussed with RN, PA team.
Patient seen and examined by me. Mental status improving but still with confusion. Failed swallow evaluation x 2, will need a PEG feeding tube. Followup with ENT regarding management of neck mass. Dispo planning after PEG.    Eduardo Morin M.D.
Patient seen and examined by me. Presented altered with left temporal traumatic SAH, contusion, subdural hematoma. Repeat brain imaging stable, patient clinically improving. Awake alert, follows commands, expressive aphasia, AVILES antigravity. Plan for PT/OT/speech therapy, TBI rehab, AED, ENT for neck mass evaluation.     Eduardo Morin M.D.
Patient seen and examined by me. On high flow O2 in stepdown, medicine service. Mental status improving. Alert, following commands. Will defer airway and neck mass management to ENT and medical team. Will likely need PEG given dysphagia. Close watch for aspiration. No neurosurgical intervention needed at this point.    Eduardo Morin M.D.
Patient seen and examined with house-staff during bedside rounds  Resident note read, including vitals, physical findings, laboratory data, and radiological reports.   Revisions included below.  Case discussed with House staff  Direct personal management at bedside  and extensive interpretation of data. Decision making of high complexity.
Patient seen and examined with house-staff during bedside rounds.  Resident note read, including vitals, physical findings, laboratory data, and radiological reports.   Revisions included below.  Direct personal management at bed side and extensive interpretation of the data.  Plan was outlined and discussed in details with the housestaff.  Decision making of high complexity  Action taken for acute disease activity to reflect the level of care provided:  - medication reconciliation  - review laboratory data  Mental status improved. As patient is more alert and responding appropriately to commands. Continue to feed. The NG tube was removed. Swallow evaluation tomorrow. The patient is hemodynamically stable. Patient has a strong cough and Avapro but that his airway. Patient hemodynamically stable. Out of bed in chair. Patient was evaluated for transfer to a regular floor
Patient seen and examined; Will repeat chest xray prior to him leaving hospital for Rehab;
Stable and chest saqib improved; No change in  current regimen
Patient seen and examined with house-staff during bedside rounds.  Resident note read, including vitals, physical findings, laboratory data, and radiological reports.   Revisions included below.  Direct personal management at bed side and extensive interpretation of the data.  Plan was outlined and discussed in details with the housestaff.  Decision making of high complexity  Action taken for acute disease activity to reflect the level of care provided:  - medication reconciliation  - review laboratory data  he is improving  7-10 of antibiotic  tolerating diet
Patient seen and examined with house-staff during bedside rounds.  Resident note read, including vitals, physical findings, laboratory data, and radiological reports.   Revisions included below.  Direct personal management at bed side and extensive interpretation of the data.  Plan was outlined and discussed in details with the housestaff.  Decision making of high complexity  Action taken for acute disease activity to reflect the level of care provided:  - medication reconciliation  - review laboratory data  tolerating Po  no aspiration  not using PEG  BS in controlled on insulin  BP is controlled  discussed with NS start aspirin for A fib and restart apixaban in one month after repeat CT scan of chest  I had a peer to per review with the physician at [facility] and reviewed all medical conditions and hospital course
Patient seen and examined with house-staff during bedside rounds.  Resident note read, including vitals, physical findings, laboratory data, and radiological reports.   Revisions included below.  Direct personal management at bed side and extensive interpretation of the data.  Plan was outlined and discussed in details with the housestaff.  Decision making of high complexity  Action taken for acute disease activity to reflect the level of care provided:  - medication reconciliation  - review laboratory data  he is slowly improving and tolerating tube feed   follow on swallow evaluation  continue wiliam  still has productive cough but less  evaluation by PT  cardiac and neurological status improving and stable
Patient seen and examined with house-staff during bedside rounds  Resident note read, including vitals, physical findings, laboratory data, and radiological reports.   Revisions included below.  Case discussed with House staff  Direct personal management at bedside  and extensive interpretation of data. Decision making of high complexity.

## 2019-12-15 NOTE — PROGRESS NOTE ADULT - ASSESSMENT
Pt is an 89M with PMH DMII admitted s/p fall with traumatic SAH/SDH on 11/28, was admitted to [facility] with aspiration pneumonia and s/p PEG now on RMF for further management and placement to [facility]

## 2019-12-15 NOTE — PROGRESS NOTE ADULT - REASON FOR ADMISSION
traumatic SAH found down
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
mild TBI
traumatic SAH found down

## 2019-12-15 NOTE — PROGRESS NOTE ADULT - PROBLEM SELECTOR PLAN 9
1) PCP Contacted on Admission: (Y/N) --> Name & Phone #:  2) Date of Contact with PCP:  3) PCP Contacted at Discharge: (Y/N)  4) Summary of Handoff Given to PCP:   5) Post-Discharge Appointment Date and Location:
1) PCP Contacted on Admission: (Y/N) --> Name & Phone #:  2) Date of Contact with PCP:  3) PCP Contacted at Discharge: (Y/N)  4) Summary of Handoff Given to PCP:   5) Post-Discharge Appointment Date and Location:
1) PCP Contacted on Admission: (Y/N) --> Name & Phone #: Dr. Elliott  2) Date of Contact with PCP:  3) PCP Contacted at Discharge: (Y/N)  4) Summary of Handoff Given to PCP:   5) Post-Discharge Appointment Date and Location:
1) PCP Contacted on Admission: (Y/N) --> Name & Phone #:  2) Date of Contact with PCP:  3) PCP Contacted at Discharge: (Y/N)  4) Summary of Handoff Given to PCP:   5) Post-Discharge Appointment Date and Location:

## 2019-12-15 NOTE — PROGRESS NOTE ADULT - SUBJECTIVE AND OBJECTIVE BOX
INTERVAL HPI/OVERNIGHT EVENTS:  No complaint; Chest film from yesterday reveals improvement;  Otherwise Vital signs stable      MEDICATIONS  (STANDING):  ascorbic acid 500 milliGRAM(s) Oral daily  aspirin enteric coated 81 milliGRAM(s) Oral daily  dextrose 5%. 1000 milliLiter(s) (50 mL/Hr) IV Continuous <Continuous>  dextrose 50% Injectable 12.5 Gram(s) IV Push once  enoxaparin Injectable 40 milliGRAM(s) SubCutaneous every 24 hours  insulin glargine Injectable (LANTUS) 5 Unit(s) SubCutaneous at bedtime  insulin lispro (HumaLOG) corrective regimen sliding scale   SubCutaneous every 6 hours  insulin lispro Injectable (HumaLOG) 4 Unit(s) SubCutaneous three times a day before meals  metoprolol tartrate 12.5 milliGRAM(s) Oral every 12 hours  multivitamin 1 Tablet(s) Oral daily  zinc sulfate 220 milliGRAM(s) Oral daily    MEDICATIONS  (PRN):  acetaminophen    Suspension .. 650 milliGRAM(s) Oral every 6 hours PRN Temp greater or equal to 38C (100.4F), Mild Pain (1 - 3)  dextrose 40% Gel 15 Gram(s) Oral once PRN Blood Glucose LESS THAN 70 milliGRAM(s)/deciliter  glucagon  Injectable 1 milliGRAM(s) IntraMuscular once PRN Glucose LESS THAN 70 milligrams/deciliter      Allergies    No Known Allergies    Intolerances        Vital Signs Last 24 Hrs  T(C): 36.6 (15 Dec 2019 05:25), Max: 36.6 (14 Dec 2019 13:46)  T(F): 97.8 (15 Dec 2019 05:25), Max: 97.9 (14 Dec 2019 13:46)  HR: 91 (15 Dec 2019 05:25) (91 - 97)  BP: 109/65 (15 Dec 2019 05:25) (102/64 - 115/68)  BP(mean): --  RR: 15 (15 Dec 2019 05:25) (15 - 16)  SpO2: 98% (15 Dec 2019 05:25) (97% - 98%)          Constitutional:  Awake    Eyes: OVIDIO    ENMT: Negative    Neck: Supple    Back:  no tenderness     Respiratory:  clear    Cardiovascular: S1 S2    Gastrointestinal: soft    Genitourinary:    Extremities:  no edema    Vascular:    Neurological: intact    Skin:    Lymph Nodes:            12-14 @ 07:01  -  12-15 @ 07:00  --------------------------------------------------------  IN: 0 mL / OUT: 1200 mL / NET: -1200 mL      LABS:                        10.1   7.01  )-----------( 436      ( 15 Dec 2019 06:03 )             32.7     12-14    138  |  101  |  10  ----------------------------<  50<L>  4.7   |  30  |  0.63    Ca    8.7      14 Dec 2019 05:55  Mg     2.0     12-14            RADIOLOGY & ADDITIONAL TESTS:

## 2019-12-15 NOTE — PROGRESS NOTE ADULT - PROBLEM SELECTOR PLAN 2
History of DM. A1c 7.0. Sugars have been relatively controlled in 100-200s  - AM BGs in 50s. Adjusted lantus to 5 units and lispro 4 units premeal   - ISS

## 2019-12-16 ENCOUNTER — TRANSCRIPTION ENCOUNTER (OUTPATIENT)
Age: 84
End: 2019-12-16

## 2019-12-16 VITALS
DIASTOLIC BLOOD PRESSURE: 62 MMHG | OXYGEN SATURATION: 96 % | RESPIRATION RATE: 16 BRPM | TEMPERATURE: 98 F | HEART RATE: 94 BPM | SYSTOLIC BLOOD PRESSURE: 100 MMHG

## 2019-12-16 LAB
ANION GAP SERPL CALC-SCNC: 8 MMOL/L — SIGNIFICANT CHANGE UP (ref 5–17)
BUN SERPL-MCNC: 11 MG/DL — SIGNIFICANT CHANGE UP (ref 7–23)
CALCIUM SERPL-MCNC: 8.5 MG/DL — SIGNIFICANT CHANGE UP (ref 8.4–10.5)
CHLORIDE SERPL-SCNC: 100 MMOL/L — SIGNIFICANT CHANGE UP (ref 96–108)
CO2 SERPL-SCNC: 28 MMOL/L — SIGNIFICANT CHANGE UP (ref 22–31)
CREAT SERPL-MCNC: 0.51 MG/DL — SIGNIFICANT CHANGE UP (ref 0.5–1.3)
GLUCOSE BLDC GLUCOMTR-MCNC: 128 MG/DL — HIGH (ref 70–99)
GLUCOSE SERPL-MCNC: 141 MG/DL — HIGH (ref 70–99)
HCT VFR BLD CALC: 32.4 % — LOW (ref 39–50)
HGB BLD-MCNC: 10 G/DL — LOW (ref 13–17)
MAGNESIUM SERPL-MCNC: 2 MG/DL — SIGNIFICANT CHANGE UP (ref 1.6–2.6)
MCHC RBC-ENTMCNC: 30.5 PG — SIGNIFICANT CHANGE UP (ref 27–34)
MCHC RBC-ENTMCNC: 30.9 GM/DL — LOW (ref 32–36)
MCV RBC AUTO: 98.8 FL — SIGNIFICANT CHANGE UP (ref 80–100)
NRBC # BLD: 0 /100 WBCS — SIGNIFICANT CHANGE UP (ref 0–0)
PLATELET # BLD AUTO: 453 K/UL — HIGH (ref 150–400)
POTASSIUM SERPL-MCNC: 4.2 MMOL/L — SIGNIFICANT CHANGE UP (ref 3.5–5.3)
POTASSIUM SERPL-SCNC: 4.2 MMOL/L — SIGNIFICANT CHANGE UP (ref 3.5–5.3)
RBC # BLD: 3.28 M/UL — LOW (ref 4.2–5.8)
RBC # FLD: 14.7 % — HIGH (ref 10.3–14.5)
SODIUM SERPL-SCNC: 136 MMOL/L — SIGNIFICANT CHANGE UP (ref 135–145)
WBC # BLD: 4.66 K/UL — SIGNIFICANT CHANGE UP (ref 3.8–10.5)
WBC # FLD AUTO: 4.66 K/UL — SIGNIFICANT CHANGE UP (ref 3.8–10.5)

## 2019-12-16 PROCEDURE — 92523 SPEECH SOUND LANG COMPREHEN: CPT

## 2019-12-16 PROCEDURE — 80053 COMPREHEN METABOLIC PANEL: CPT

## 2019-12-16 PROCEDURE — 80202 ASSAY OF VANCOMYCIN: CPT

## 2019-12-16 PROCEDURE — 85610 PROTHROMBIN TIME: CPT

## 2019-12-16 PROCEDURE — 93306 TTE W/DOPPLER COMPLETE: CPT

## 2019-12-16 PROCEDURE — 86901 BLOOD TYPING SEROLOGIC RH(D): CPT

## 2019-12-16 PROCEDURE — 84100 ASSAY OF PHOSPHORUS: CPT

## 2019-12-16 PROCEDURE — C1887: CPT

## 2019-12-16 PROCEDURE — 97161 PT EVAL LOW COMPLEX 20 MIN: CPT

## 2019-12-16 PROCEDURE — 84443 ASSAY THYROID STIM HORMONE: CPT

## 2019-12-16 PROCEDURE — 85025 COMPLETE CBC W/AUTO DIFF WBC: CPT

## 2019-12-16 PROCEDURE — 84484 ASSAY OF TROPONIN QUANT: CPT

## 2019-12-16 PROCEDURE — 86900 BLOOD TYPING SEROLOGIC ABO: CPT

## 2019-12-16 PROCEDURE — 97110 THERAPEUTIC EXERCISES: CPT

## 2019-12-16 PROCEDURE — 87040 BLOOD CULTURE FOR BACTERIA: CPT

## 2019-12-16 PROCEDURE — 85027 COMPLETE CBC AUTOMATED: CPT

## 2019-12-16 PROCEDURE — 99239 HOSP IP/OBS DSCHRG MGMT >30: CPT

## 2019-12-16 PROCEDURE — 82803 BLOOD GASES ANY COMBINATION: CPT

## 2019-12-16 PROCEDURE — 82330 ASSAY OF CALCIUM: CPT

## 2019-12-16 PROCEDURE — 70498 CT ANGIOGRAPHY NECK: CPT

## 2019-12-16 PROCEDURE — 92610 EVALUATE SWALLOWING FUNCTION: CPT

## 2019-12-16 PROCEDURE — 70450 CT HEAD/BRAIN W/O DYE: CPT

## 2019-12-16 PROCEDURE — C1894: CPT

## 2019-12-16 PROCEDURE — 93005 ELECTROCARDIOGRAM TRACING: CPT | Mod: 76,XU

## 2019-12-16 PROCEDURE — 83605 ASSAY OF LACTIC ACID: CPT

## 2019-12-16 PROCEDURE — 70553 MRI BRAIN STEM W/O & W/DYE: CPT

## 2019-12-16 PROCEDURE — 87641 MR-STAPH DNA AMP PROBE: CPT

## 2019-12-16 PROCEDURE — 83874 ASSAY OF MYOGLOBIN: CPT

## 2019-12-16 PROCEDURE — 92526 ORAL FUNCTION THERAPY: CPT

## 2019-12-16 PROCEDURE — 82010 KETONE BODYS QUAN: CPT

## 2019-12-16 PROCEDURE — 36415 COLL VENOUS BLD VENIPUNCTURE: CPT

## 2019-12-16 PROCEDURE — 81003 URINALYSIS AUTO W/O SCOPE: CPT

## 2019-12-16 PROCEDURE — 85730 THROMBOPLASTIN TIME PARTIAL: CPT

## 2019-12-16 PROCEDURE — 87086 URINE CULTURE/COLONY COUNT: CPT

## 2019-12-16 PROCEDURE — L8699: CPT

## 2019-12-16 PROCEDURE — 97112 NEUROMUSCULAR REEDUCATION: CPT

## 2019-12-16 PROCEDURE — 92507 TX SP LANG VOICE COMM INDIV: CPT

## 2019-12-16 PROCEDURE — 84295 ASSAY OF SERUM SODIUM: CPT

## 2019-12-16 PROCEDURE — 99285 EMERGENCY DEPT VISIT HI MDM: CPT | Mod: 25

## 2019-12-16 PROCEDURE — A9585: CPT

## 2019-12-16 PROCEDURE — 82550 ASSAY OF CK (CPK): CPT

## 2019-12-16 PROCEDURE — 86850 RBC ANTIBODY SCREEN: CPT

## 2019-12-16 PROCEDURE — C1769: CPT

## 2019-12-16 PROCEDURE — 70551 MRI BRAIN STEM W/O DYE: CPT

## 2019-12-16 PROCEDURE — 97530 THERAPEUTIC ACTIVITIES: CPT

## 2019-12-16 PROCEDURE — 80307 DRUG TEST PRSMV CHEM ANLYZR: CPT

## 2019-12-16 PROCEDURE — 12002 RPR S/N/AX/GEN/TRNK2.6-7.5CM: CPT

## 2019-12-16 PROCEDURE — 80061 LIPID PANEL: CPT

## 2019-12-16 PROCEDURE — 72125 CT NECK SPINE W/O DYE: CPT

## 2019-12-16 PROCEDURE — 96374 THER/PROPH/DIAG INJ IV PUSH: CPT | Mod: XU

## 2019-12-16 PROCEDURE — 97116 GAIT TRAINING THERAPY: CPT

## 2019-12-16 PROCEDURE — 49440 PLACE GASTROSTOMY TUBE PERC: CPT

## 2019-12-16 PROCEDURE — 70496 CT ANGIOGRAPHY HEAD: CPT

## 2019-12-16 PROCEDURE — 97163 PT EVAL HIGH COMPLEX 45 MIN: CPT

## 2019-12-16 PROCEDURE — 74150 CT ABDOMEN W/O CONTRAST: CPT

## 2019-12-16 PROCEDURE — 83735 ASSAY OF MAGNESIUM: CPT

## 2019-12-16 PROCEDURE — 84132 ASSAY OF SERUM POTASSIUM: CPT

## 2019-12-16 PROCEDURE — 97535 SELF CARE MNGMENT TRAINING: CPT

## 2019-12-16 PROCEDURE — 83036 HEMOGLOBIN GLYCOSYLATED A1C: CPT

## 2019-12-16 PROCEDURE — 82962 GLUCOSE BLOOD TEST: CPT

## 2019-12-16 PROCEDURE — 71045 X-RAY EXAM CHEST 1 VIEW: CPT

## 2019-12-16 PROCEDURE — 92612 ENDOSCOPY SWALLOW (FEES) VID: CPT

## 2019-12-16 PROCEDURE — 80048 BASIC METABOLIC PNL TOTAL CA: CPT

## 2019-12-16 PROCEDURE — 84145 PROCALCITONIN (PCT): CPT

## 2019-12-16 PROCEDURE — 93970 EXTREMITY STUDY: CPT

## 2019-12-16 PROCEDURE — 84436 ASSAY OF TOTAL THYROXINE: CPT

## 2019-12-16 RX ORDER — INSULIN LISPRO 100/ML
4 VIAL (ML) SUBCUTANEOUS
Qty: 0 | Refills: 0 | DISCHARGE

## 2019-12-16 RX ORDER — ASPIRIN/CALCIUM CARB/MAGNESIUM 324 MG
1 TABLET ORAL
Qty: 0 | Refills: 0 | DISCHARGE
Start: 2019-12-16

## 2019-12-16 RX ORDER — PSYLLIUM SEED (WITH DEXTROSE)
0 POWDER (GRAM) ORAL
Qty: 0 | Refills: 0 | DISCHARGE
Start: 2019-12-16

## 2019-12-16 RX ADMIN — Medication 12.5 MILLIGRAM(S): at 07:16

## 2019-12-16 RX ADMIN — Medication 1 TABLET(S): at 11:22

## 2019-12-16 RX ADMIN — Medication 4 UNIT(S): at 08:45

## 2019-12-16 RX ADMIN — Medication 500 MILLIGRAM(S): at 11:22

## 2019-12-16 RX ADMIN — ZINC SULFATE TAB 220 MG (50 MG ZINC EQUIVALENT) 220 MILLIGRAM(S): 220 (50 ZN) TAB at 11:22

## 2019-12-16 RX ADMIN — Medication 81 MILLIGRAM(S): at 11:22

## 2019-12-16 RX ADMIN — Medication 1 PACKET(S): at 11:22

## 2019-12-16 NOTE — DISCHARGE NOTE NURSING/CASE MANAGEMENT/SOCIAL WORK - PATIENT PORTAL LINK FT
You can access the FollowMyHealth Patient Portal offered by [facility] by registering at the following website: [email or web address]. By joining [facility]’s FollowMyHealth portal, you will also be able to view your health information using other applications (apps) compatible with our system.

## 2019-12-16 NOTE — DISCHARGE NOTE NURSING/CASE MANAGEMENT/SOCIAL WORK - NSDCFUADDAPPT_GEN_ALL_CORE_FT
Please follow up with Dr. Morin, Neurosurgery when discharged from rehab. An appointment has been made for you January 13, 2020 at 10:30 AM.    Please follow up with Dr. Elliott.

## 2020-01-13 ENCOUNTER — APPOINTMENT (OUTPATIENT)
Dept: HEART AND VASCULAR | Facility: CLINIC | Age: 85
End: 2020-01-13

## 2020-01-13 ENCOUNTER — APPOINTMENT (OUTPATIENT)
Dept: NEUROSURGERY | Facility: CLINIC | Age: 85
End: 2020-01-13

## 2020-01-24 NOTE — ED PROVIDER NOTE - CARDIAC, MLM
Anesthesia Post Evaluation    Patient: Janina Klein    Procedure(s) Performed: Procedure(s) (LRB):  COLONOSCOPY (N/A)  ESOPHAGOGASTRODUODENOSCOPY (EGD) (N/A)    Final Anesthesia Type: general    Patient location during evaluation: PACU  Patient participation: Yes- Able to Participate  Level of consciousness: awake and awake and alert  Post-procedure vital signs: reviewed and stable  Pain management: adequate  Airway patency: patent    PONV status at discharge: No PONV  Anesthetic complications: no      Cardiovascular status: blood pressure returned to baseline  Respiratory status: unassisted and spontaneous ventilation  Hydration status: euvolemic  Follow-up not needed.          Vitals Value Taken Time   /90 1/24/2020  9:00 AM   Temp 36.1 °C (96.9 °F) 1/24/2020  8:04 AM   Pulse 71 1/24/2020  8:55 AM   Resp 14 1/24/2020  8:55 AM   SpO2 95 % 1/24/2020  8:55 AM   Vitals shown include unvalidated device data.      Event Time     Out of Recovery 08:40:00          Pain/Kit Score: Kit Score: 10 (1/24/2020  8:30 AM)  Modified Kit Score: 20 (1/24/2020  9:15 AM)        
Normal rate, regular rhythm.  Heart sounds S1, S2.

## 2020-09-29 NOTE — OCCUPATIONAL THERAPY INITIAL EVALUATION ADULT - PHYSICAL ASSIST/NONPHYSICAL ASSIST: STAND/SIT, REHAB EVAL
Addended by: YOANNA SANCHEZ on: 9/29/2020 08:58 AM     Modules accepted: Jasmina Szymanski     2 person assist

## 2021-09-28 NOTE — PATIENT PROFILE ADULT - NSPROPOAURINARYCATHETER_GEN_A_NUR

## 2022-02-15 NOTE — BEHAVIORAL HEALTH ASSESSMENT NOTE - ESTIMATED INTELLIGENCE
No evidence of temporomandibular joint syndrome on the day to your visit. Consider trigeminal neuralgia. I placed you on his second round of antibiotics for possible maxillary sinusitis.
Average

## 2023-04-04 NOTE — PROGRESS NOTE ADULT - PROBLEM SELECTOR PLAN 5
S/p subarachnoid bleed  - Stable per neurosurgery  - Monitor for changes in neurologic status, currently A&Ox3 no

## 2023-05-10 NOTE — DISCHARGE NOTE NURSING/CASE MANAGEMENT/SOCIAL WORK - NSDCPEPTSTRK_GEN_ALL_CORE
Need for follow up after discharge/Risk factors for stroke/Stroke warning signs and symptoms/Signs and symptoms of stroke/Stroke support groups for patients, families, and friends/Stroke education booklet/Prescribed medications/Call 911 for stroke no

## 2024-03-13 NOTE — PROGRESS NOTE ADULT - PROBLEM SELECTOR PROBLEM 3
Spoke to pt to schedule procedure(s) Upper Endoscopy (EGD)       Physician to perform procedure(s) Dr. LOU Watts  Date of Procedure (s) 3/19/24  Arrival Time 7:00 AM  Time of Procedure(s) 8:00 AM   Location of Procedure(s) [place] 2nd Floor  Type of Rx Prep sent to patient: Other  Instructions provided to patient via [facility]    Patient was informed on the following information and verbalized understanding. Screening questionnaire reviewed with patient and complete. If procedure requires anesthesia, a responsible adult needs to be present to accompany the patient home, patient cannot drive after receiving anesthesia. Appointment details are tentative, especially check-in time. Patient will receive a prep-op call 7 days prior to confirm check-in time for procedure. If applicable the patient should contact their pharmacy to verify Rx for procedure prep is ready for pick-up. Patient was advised to call the scheduling department at [phone] if pharmacy states no Rx is available. Patient was advised to call the endoscopy scheduling department if any questions or concerns arise.      [facility] Scheduling Department       Paroxysmal A-fib
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
## 2025-04-10 NOTE — ED PROVIDER NOTE - DISCHARGE DATE
Message was left to return call regarding [facility]. Please verify pt still wants services and update any preferences.    07-Nov-2019

## 2025-07-16 NOTE — PATIENT PROFILE ADULT - PRIMARY ROLES/RESPONSIBILITIES
Spoke with Malik. Reports that patient last completed IV treatment via port in 2023. States there was discussion on possibly removing the port after treatment was finished but at this time patient still has it. Doesn't believe it had been flushed since last treatment. Is wondering if port is able to be removed.     Denies any signs of infection to port or noticeable discomfort by patient in port area.     Advised will discuss with doctor and call back with next steps. Malik verbalized understanding, all questions answered at this time.    none